# Patient Record
Sex: MALE | Race: WHITE | Employment: OTHER | URBAN - METROPOLITAN AREA
[De-identification: names, ages, dates, MRNs, and addresses within clinical notes are randomized per-mention and may not be internally consistent; named-entity substitution may affect disease eponyms.]

---

## 2018-03-29 PROBLEM — R55 SYNCOPE: Status: ACTIVE | Noted: 2018-03-29

## 2018-03-29 PROBLEM — E78.5 HYPERLIPIDEMIA: Status: ACTIVE | Noted: 2018-03-29

## 2018-03-29 PROBLEM — I10 HTN (HYPERTENSION): Status: ACTIVE | Noted: 2018-03-29

## 2018-03-29 PROBLEM — R55 SYNCOPE AND COLLAPSE: Status: ACTIVE | Noted: 2018-03-29

## 2020-02-21 ENCOUNTER — OFFICE VISIT (OUTPATIENT)
Dept: ORTHOPEDIC SURGERY | Age: 85
End: 2020-02-21
Payer: MEDICARE

## 2020-02-21 VITALS — BODY MASS INDEX: 25.2 KG/M2 | WEIGHT: 180 LBS | HEIGHT: 71 IN

## 2020-02-21 PROCEDURE — 1036F TOBACCO NON-USER: CPT | Performed by: PHYSICIAN ASSISTANT

## 2020-02-21 PROCEDURE — G8417 CALC BMI ABV UP PARAM F/U: HCPCS | Performed by: PHYSICIAN ASSISTANT

## 2020-02-21 PROCEDURE — G8428 CUR MEDS NOT DOCUMENT: HCPCS | Performed by: PHYSICIAN ASSISTANT

## 2020-02-21 PROCEDURE — 99203 OFFICE O/P NEW LOW 30 MIN: CPT | Performed by: PHYSICIAN ASSISTANT

## 2020-02-21 PROCEDURE — 4040F PNEUMOC VAC/ADMIN/RCVD: CPT | Performed by: PHYSICIAN ASSISTANT

## 2020-02-21 PROCEDURE — G8484 FLU IMMUNIZE NO ADMIN: HCPCS | Performed by: PHYSICIAN ASSISTANT

## 2020-02-21 PROCEDURE — 1123F ACP DISCUSS/DSCN MKR DOCD: CPT | Performed by: PHYSICIAN ASSISTANT

## 2020-02-21 NOTE — PROGRESS NOTES
Chief Complaint    Hip Pain (lt hip lateral and thigh pain, back of hip, ongoing for awhile, no injury, hx of back issues)      History of Present Illness:  Amor Zhang is a 80 y.o. male presents to the office today for a new problem. Patient is here with a chief complaint of left lateral hip pain, thigh pain, and low back pain. He states that approximately 2 months he did stumble and fall into his dresser. He remains incredibly active for 29-year-old man. Increased pain with weightbearing activities. He denies any radicular symptoms at this time. He has tried to use a walker and a cane without significant relief. Patient has also had both bladder and prostate cancer. Pain Assessment  Location of Pain: Pelvis  Location Modifiers: Left  Severity of Pain: 5  Frequency of Pain: Intermittent  Aggravating Factors: Walking, Standing, Stairs  Limiting Behavior: Some  Result of Injury: No  Work-Related Injury: No  Are there other pain locations you wish to document?: No]       Medical History:  Past Medical History:   Diagnosis Date    Cancer [facility])      Patient Active Problem List    Diagnosis Date Noted    Syncope 03/29/2018    HTN (hypertension) 03/29/2018    Hyperlipidemia 03/29/2018    Syncope and collapse 03/29/2018     No past surgical history on file. No family history on file.   Social History     Socioeconomic History    Marital status:      Spouse name: Not on file    Number of children: Not on file    Years of education: Not on file    Highest education level: Not on file   Occupational History    Not on file   Social Needs    Financial resource strain: Not on file    Food insecurity:     Worry: Not on file     Inability: Not on file    Transportation needs:     Medical: Not on file     Non-medical: Not on file   Tobacco Use    Smoking status: Never Smoker    Smokeless tobacco: Never Used   Substance and Sexual Activity    Alcohol use: No    Drug use: No    Sexual activity: Not on file   Lifestyle    Physical activity:     Days per week: Not on file     Minutes per session: Not on file    Stress: Not on file   Relationships    Social connections:     Talks on phone: Not on file     Gets together: Not on file     Attends [employer] service: Not on file     Active member of club or organization: Not on file     Attends meetings of clubs or organizations: Not on file     Relationship status: Not on file    Intimate partner violence:     Fear of current or ex partner: Not on file     Emotionally abused: Not on file     Physically abused: Not on file     Forced sexual activity: Not on file   Other Topics Concern    Not on file   Social History Narrative    Not on file     Current Outpatient Medications   Medication Sig Dispense Refill    aspirin 325 MG EC tablet Take 325 mg by mouth daily      clopidogrel (PLAVIX) 75 MG tablet Take 75 mg by mouth daily      lisinopril (PRINIVIL;ZESTRIL) 2.5 MG tablet Take 2.5 mg by mouth daily      atenolol (TENORMIN) 25 MG tablet Take 25 mg by mouth daily      simvastatin (ZOCOR) 20 MG tablet Take 20 mg by mouth nightly       No current facility-administered medications for this visit. Review of Systems:  Relevant review of systems reviewed and available in the patient's chart    Vital Signs: There were no vitals filed for this visit. General Exam:   Constitutional: Patient is adequately groomed with no evidence of malnutrition  DTRs: Deep tendon reflexes are intact  Mental Status: The patient is oriented to time, place and person. The patient's mood and affect are appropriate. Lymphatic: The lymphatic examination bilaterally reveals all areas to be without enlargement or induration. Vascular: Examination reveals no swelling or calf tenderness. Peripheral pulses are palpable and 2+. Neurological: The patient has good coordination. There is no weakness or sensory deficit.     Left hip Examination:    Inspection:  No erythema or signs of infection. There are no cutaneous lesions. Palpation:  There is moderate tenderness over the greater trochanteric region. Range of Motion: Full range of motion with reproducible groin and lateral hip pain    Strength: 5/5 strength with flexion and extension    Special Tests:  Positive Nima's test.  Negative log roll maneuver. Negative Homans test.    Skin: There are no rashes, ulcerations or lesions. Gait: Slightly antalgic gait favoring the unaffected side. Reflex 2+ patellar    Additional Comments:       Additional Examinations:         Contralateral Exam: Examination of the right hip reveals intact skin. The patient demonstrates full painless range of motion with regards to flexion, abduction, internal and external rotation. There is no tenderness about the greater trochanter. There is a negative straight leg raise against resistance. Strength is 5/5 throughout all planes. Lower Back: Examination of the lower back does not show any tenderness, deformity or injury. Range of motion is unremarkable. There is no gross instability. There are no rashes, ulcerations or lesions. Strength and tone are normal.    Radiology:     X-rays obtained and reviewed in office:  Views 2 views including AP pelvis and lateral  Location left hip  Impression no fractures or malalignment identified. The femoral acetabular joint space appears well maintained. Impression:  Encounter Diagnosis   Name Primary?  Pain of left hip joint Yes       Office Procedures:  Orders Placed This Encounter   Procedures    XR HIP LEFT (2-3 VIEWS)     Standing Status:   Future     Number of Occurrences:   1     Standing Expiration Date:   2/19/2021       Treatment Plan: With the patient's history of trauma and continued hip pain I have ordered the patient an MRI to evaluate for occult fracture. We will see him back after the MRI. Patient should continue to use his walker full-time. Max 50% weightbearing.   If there

## 2020-02-22 ENCOUNTER — HOSPITAL ENCOUNTER (OUTPATIENT)
Dept: MRI IMAGING | Age: 85
Discharge: HOME | End: 2020-02-22
Payer: MEDICARE

## 2020-02-22 PROCEDURE — 73721 MRI JNT OF LWR EXTRE W/O DYE: CPT

## 2020-02-24 NOTE — RESULT ENCOUNTER NOTE
Spoke to patient. He has bilateral hip stress fractures involving the iliac bones. He also has a compression fracture in his lumbar spine. He will be max 50% weightbearing with walker at all times. Encouraged rest and limited activities.

## 2020-02-26 ENCOUNTER — OFFICE VISIT (OUTPATIENT)
Dept: ORTHOPEDIC SURGERY | Age: 85
End: 2020-02-26
Payer: MEDICARE

## 2020-02-26 PROCEDURE — 99213 OFFICE O/P EST LOW 20 MIN: CPT | Performed by: PHYSICIAN ASSISTANT

## 2020-02-26 PROCEDURE — G8484 FLU IMMUNIZE NO ADMIN: HCPCS | Performed by: PHYSICIAN ASSISTANT

## 2020-02-26 PROCEDURE — G8417 CALC BMI ABV UP PARAM F/U: HCPCS | Performed by: PHYSICIAN ASSISTANT

## 2020-02-26 PROCEDURE — 1036F TOBACCO NON-USER: CPT | Performed by: PHYSICIAN ASSISTANT

## 2020-02-26 PROCEDURE — 4040F PNEUMOC VAC/ADMIN/RCVD: CPT | Performed by: PHYSICIAN ASSISTANT

## 2020-02-26 PROCEDURE — G8428 CUR MEDS NOT DOCUMENT: HCPCS | Performed by: PHYSICIAN ASSISTANT

## 2020-02-26 PROCEDURE — 1123F ACP DISCUSS/DSCN MKR DOCD: CPT | Performed by: PHYSICIAN ASSISTANT

## 2020-02-26 NOTE — PROGRESS NOTES
Chief Complaint    Results (mri lt hip)      History of Present Illness:  Sera Bermudez is a 80 y.o. male returns today for a follow-up on his left hip after undergoing a recent MRI. He was actually called just the other day with the results which indicated that he had bilateral nondisplaced fractures of the acetabular region. There is also possible evidence of a nondisplaced, subacute fracture of the L5 lumbar vertebrae. He was instructed to get on a walker and remain protected weightbearing. He states that approximately 2 months he did stumble and fall into his dresser. .    Pain Assessment  Location of Pain: Pelvis  Location Modifiers: Left  Severity of Pain: 2  Frequency of Pain: Intermittent  Aggravating Factors: Standing, Walking  Limiting Behavior: Some  Work-Related Injury: No  Are there other pain locations you wish to document?: No]       Medical History:  Past Medical History:   Diagnosis Date    Cancer [facility])      Patient Active Problem List    Diagnosis Date Noted    Syncope 03/29/2018    HTN (hypertension) 03/29/2018    Hyperlipidemia 03/29/2018    Syncope and collapse 03/29/2018     No past surgical history on file. No family history on file.   Social History     Socioeconomic History    Marital status:      Spouse name: Not on file    Number of children: Not on file    Years of education: Not on file    Highest education level: Not on file   Occupational History    Not on file   Social Needs    Financial resource strain: Not on file    Food insecurity:     Worry: Not on file     Inability: Not on file    Transportation needs:     Medical: Not on file     Non-medical: Not on file   Tobacco Use    Smoking status: Never Smoker    Smokeless tobacco: Never Used   Substance and Sexual Activity    Alcohol use: No    Drug use: No    Sexual activity: Not on file   Lifestyle    Physical activity:     Days per week: Not on file     Minutes per session: Not on file    Stress: Not on file   Relationships    Social connections:     Talks on phone: Not on file     Gets together: Not on file     Attends Sikhism service: Not on file     Active member of club or organization: Not on file     Attends meetings of clubs or organizations: Not on file     Relationship status: Not on file    Intimate partner violence:     Fear of current or ex partner: Not on file     Emotionally abused: Not on file     Physically abused: Not on file     Forced sexual activity: Not on file   Other Topics Concern    Not on file   Social History Narrative    Not on file     Current Outpatient Medications   Medication Sig Dispense Refill    aspirin 325 MG EC tablet Take 325 mg by mouth daily      clopidogrel (PLAVIX) 75 MG tablet Take 75 mg by mouth daily      lisinopril (PRINIVIL;ZESTRIL) 2.5 MG tablet Take 2.5 mg by mouth daily      atenolol (TENORMIN) 25 MG tablet Take 25 mg by mouth daily      simvastatin (ZOCOR) 20 MG tablet Take 20 mg by mouth nightly       No current facility-administered medications for this visit. Review of Systems:  Relevant review of systems reviewed and available in the patient's chart    Vital Signs: There were no vitals filed for this visit. General Exam:   Constitutional: Patient is adequately groomed with no evidence of malnutrition  DTRs: Deep tendon reflexes are intact  Mental Status: The patient is oriented to time, place and person. The patient's mood and affect are appropriate. Lymphatic: The lymphatic examination bilaterally reveals all areas to be without enlargement or induration. Vascular: Examination reveals no swelling or calf tenderness. Peripheral pulses are palpable and 2+. Neurological: The patient has good coordination. There is no weakness or sensory deficit. Left hip Examination:    Inspection:  No erythema or signs of infection. There are no cutaneous lesions.     Palpation:  There is moderate tenderness over the greater trochanteric this encounter. Treatment Plan: Today have gone over the diagnosis and the MRI results with the patient and his wife. I would recommend 4 to 6 weeks of protected weightbearing with a traditional walker. He understands that he must refrain from any labors activity during this period of time. We need to see him back in 4 weeks for repeat clinical exam and x-ray. If he is doing better at that time he may benefit from a course of outpatient physical therapy.

## 2020-03-27 ENCOUNTER — CLINICAL DOCUMENTATION (OUTPATIENT)
Dept: ORTHOPEDIC SURGERY | Age: 85
End: 2020-03-27

## 2020-11-03 PROBLEM — R55 SYNCOPE: Status: RESOLVED | Noted: 2018-03-29 | Resolved: 2020-11-03

## 2020-11-29 ENCOUNTER — APPOINTMENT (OUTPATIENT)
Dept: GENERAL RADIOLOGY | Age: 85
DRG: 481 | End: 2020-11-29
Payer: MEDICARE

## 2020-11-29 ENCOUNTER — APPOINTMENT (OUTPATIENT)
Dept: CT IMAGING | Age: 85
DRG: 481 | End: 2020-11-29
Payer: MEDICARE

## 2020-11-29 ENCOUNTER — HOSPITAL ENCOUNTER (INPATIENT)
Age: 85
LOS: 12 days | Discharge: NURSING, REHAB OR LONG-TERM CARE | DRG: 481 | End: 2020-12-11
Attending: EMERGENCY MEDICINE | Admitting: INTERNAL MEDICINE
Payer: MEDICARE

## 2020-11-29 PROBLEM — S72.001A CLOSED RIGHT HIP FRACTURE (HCC): Status: ACTIVE | Noted: 2020-11-29

## 2020-11-29 PROBLEM — S72.001A CLOSED RIGHT HIP FRACTURE, INITIAL ENCOUNTER (HCC): Status: ACTIVE | Noted: 2020-11-29

## 2020-11-29 PROBLEM — N17.9 AKI (ACUTE KIDNEY INJURY) (HCC): Status: ACTIVE | Noted: 2020-11-29

## 2020-11-29 LAB
A/G RATIO: 1.5 (ref 1.1–2.2)
ALBUMIN SERPL-MCNC: 4.1 G/DL (ref 3.4–5)
ALP BLD-CCNC: 96 U/L (ref 40–129)
ALT SERPL-CCNC: 13 U/L (ref 10–40)
ANION GAP SERPL CALCULATED.3IONS-SCNC: 8 MMOL/L (ref 3–16)
AST SERPL-CCNC: 23 U/L (ref 15–37)
BACTERIA: ABNORMAL /HPF
BASOPHILS ABSOLUTE: 0.1 K/UL (ref 0–0.2)
BASOPHILS RELATIVE PERCENT: 0.6 %
BILIRUB SERPL-MCNC: 0.6 MG/DL (ref 0–1)
BILIRUBIN URINE: NEGATIVE
BLOOD, URINE: ABNORMAL
BUN BLDV-MCNC: 40 MG/DL (ref 7–20)
CALCIUM SERPL-MCNC: 8.8 MG/DL (ref 8.3–10.6)
CHLORIDE BLD-SCNC: 107 MMOL/L (ref 99–110)
CLARITY: CLEAR
CO2: 26 MMOL/L (ref 21–32)
COLOR: YELLOW
CREAT SERPL-MCNC: 1.8 MG/DL (ref 0.8–1.3)
EOSINOPHILS ABSOLUTE: 0.2 K/UL (ref 0–0.6)
EOSINOPHILS RELATIVE PERCENT: 2.1 %
EPITHELIAL CELLS, UA: ABNORMAL /HPF (ref 0–5)
GFR AFRICAN AMERICAN: 43
GFR NON-AFRICAN AMERICAN: 36
GLOBULIN: 2.7 G/DL
GLUCOSE BLD-MCNC: 130 MG/DL (ref 70–99)
GLUCOSE URINE: NEGATIVE MG/DL
HCT VFR BLD CALC: 34.7 % (ref 40.5–52.5)
HEMOGLOBIN: 11.8 G/DL (ref 13.5–17.5)
INR BLD: 1.14 (ref 0.86–1.14)
KETONES, URINE: ABNORMAL MG/DL
LEUKOCYTE ESTERASE, URINE: NEGATIVE
LYMPHOCYTES ABSOLUTE: 1.6 K/UL (ref 1–5.1)
LYMPHOCYTES RELATIVE PERCENT: 15.4 %
MCH RBC QN AUTO: 32.8 PG (ref 26–34)
MCHC RBC AUTO-ENTMCNC: 33.9 G/DL (ref 31–36)
MCV RBC AUTO: 96.8 FL (ref 80–100)
MICROSCOPIC EXAMINATION: YES
MONOCYTES ABSOLUTE: 0.8 K/UL (ref 0–1.3)
MONOCYTES RELATIVE PERCENT: 7.9 %
MUCUS: ABNORMAL /LPF
NEUTROPHILS ABSOLUTE: 7.8 K/UL (ref 1.7–7.7)
NEUTROPHILS RELATIVE PERCENT: 74 %
NITRITE, URINE: NEGATIVE
PDW BLD-RTO: 13.2 % (ref 12.4–15.4)
PH UA: 6 (ref 5–8)
PLATELET # BLD: 275 K/UL (ref 135–450)
PMV BLD AUTO: 8 FL (ref 5–10.5)
POTASSIUM SERPL-SCNC: 4.3 MMOL/L (ref 3.5–5.1)
PROTEIN UA: NEGATIVE MG/DL
PROTHROMBIN TIME: 13.3 SEC (ref 10–13.2)
RBC # BLD: 3.58 M/UL (ref 4.2–5.9)
RBC UA: ABNORMAL /HPF (ref 0–4)
SODIUM BLD-SCNC: 141 MMOL/L (ref 136–145)
SPECIFIC GRAVITY UA: >=1.03 (ref 1–1.03)
SPECIMEN STATUS: NORMAL
TOTAL PROTEIN: 6.8 G/DL (ref 6.4–8.2)
URINE TYPE: ABNORMAL
UROBILINOGEN, URINE: 0.2 E.U./DL
WBC # BLD: 10.5 K/UL (ref 4–11)
WBC UA: ABNORMAL /HPF (ref 0–5)

## 2020-11-29 PROCEDURE — 2580000003 HC RX 258: Performed by: NURSE PRACTITIONER

## 2020-11-29 PROCEDURE — 96374 THER/PROPH/DIAG INJ IV PUSH: CPT

## 2020-11-29 PROCEDURE — 85025 COMPLETE CBC W/AUTO DIFF WBC: CPT

## 2020-11-29 PROCEDURE — 71045 X-RAY EXAM CHEST 1 VIEW: CPT

## 2020-11-29 PROCEDURE — 80053 COMPREHEN METABOLIC PANEL: CPT

## 2020-11-29 PROCEDURE — 6360000002 HC RX W HCPCS: Performed by: NURSE PRACTITIONER

## 2020-11-29 PROCEDURE — 70450 CT HEAD/BRAIN W/O DYE: CPT

## 2020-11-29 PROCEDURE — 72125 CT NECK SPINE W/O DYE: CPT

## 2020-11-29 PROCEDURE — 81001 URINALYSIS AUTO W/SCOPE: CPT

## 2020-11-29 PROCEDURE — 1200000000 HC SEMI PRIVATE

## 2020-11-29 PROCEDURE — 73502 X-RAY EXAM HIP UNI 2-3 VIEWS: CPT

## 2020-11-29 PROCEDURE — 93005 ELECTROCARDIOGRAM TRACING: CPT | Performed by: NURSE PRACTITIONER

## 2020-11-29 PROCEDURE — 85610 PROTHROMBIN TIME: CPT

## 2020-11-29 PROCEDURE — 96375 TX/PRO/DX INJ NEW DRUG ADDON: CPT

## 2020-11-29 PROCEDURE — 36415 COLL VENOUS BLD VENIPUNCTURE: CPT

## 2020-11-29 PROCEDURE — 99284 EMERGENCY DEPT VISIT MOD MDM: CPT

## 2020-11-29 RX ORDER — ONDANSETRON 2 MG/ML
4 INJECTION INTRAMUSCULAR; INTRAVENOUS EVERY 30 MIN PRN
Status: COMPLETED | OUTPATIENT
Start: 2020-11-29 | End: 2020-11-29

## 2020-11-29 RX ORDER — SODIUM CHLORIDE 9 MG/ML
INJECTION, SOLUTION INTRAVENOUS CONTINUOUS
Status: DISCONTINUED | OUTPATIENT
Start: 2020-11-29 | End: 2020-12-02

## 2020-11-29 RX ORDER — POLYETHYLENE GLYCOL 3350 17 G/17G
17 POWDER, FOR SOLUTION ORAL DAILY PRN
Status: DISCONTINUED | OUTPATIENT
Start: 2020-11-29 | End: 2020-12-11 | Stop reason: HOSPADM

## 2020-11-29 RX ORDER — MORPHINE SULFATE 4 MG/ML
4 INJECTION, SOLUTION INTRAMUSCULAR; INTRAVENOUS EVERY 30 MIN PRN
Status: DISCONTINUED | OUTPATIENT
Start: 2020-11-29 | End: 2020-12-03

## 2020-11-29 RX ORDER — CLOPIDOGREL BISULFATE 75 MG/1
75 TABLET ORAL DAILY
Status: DISCONTINUED | OUTPATIENT
Start: 2020-11-30 | End: 2020-12-11 | Stop reason: HOSPADM

## 2020-11-29 RX ORDER — TRAMADOL HYDROCHLORIDE 50 MG/1
50 TABLET ORAL EVERY 6 HOURS PRN
Status: DISCONTINUED | OUTPATIENT
Start: 2020-11-29 | End: 2020-12-11 | Stop reason: HOSPADM

## 2020-11-29 RX ORDER — MORPHINE SULFATE 4 MG/ML
4 INJECTION, SOLUTION INTRAMUSCULAR; INTRAVENOUS
Status: DISCONTINUED | OUTPATIENT
Start: 2020-11-29 | End: 2020-12-03

## 2020-11-29 RX ORDER — ATENOLOL 25 MG/1
25 TABLET ORAL DAILY
Status: DISCONTINUED | OUTPATIENT
Start: 2020-11-30 | End: 2020-12-09

## 2020-11-29 RX ORDER — MORPHINE SULFATE 2 MG/ML
2 INJECTION, SOLUTION INTRAMUSCULAR; INTRAVENOUS
Status: DISCONTINUED | OUTPATIENT
Start: 2020-11-29 | End: 2020-12-03

## 2020-11-29 RX ORDER — ONDANSETRON 2 MG/ML
4 INJECTION INTRAMUSCULAR; INTRAVENOUS EVERY 6 HOURS PRN
Status: DISCONTINUED | OUTPATIENT
Start: 2020-11-29 | End: 2020-12-11 | Stop reason: HOSPADM

## 2020-11-29 RX ORDER — TRAMADOL HYDROCHLORIDE 50 MG/1
100 TABLET ORAL EVERY 6 HOURS PRN
Status: DISCONTINUED | OUTPATIENT
Start: 2020-11-29 | End: 2020-12-11 | Stop reason: HOSPADM

## 2020-11-29 RX ORDER — SODIUM CHLORIDE 0.9 % (FLUSH) 0.9 %
10 SYRINGE (ML) INJECTION PRN
Status: DISCONTINUED | OUTPATIENT
Start: 2020-11-29 | End: 2020-12-11 | Stop reason: HOSPADM

## 2020-11-29 RX ORDER — SODIUM CHLORIDE 0.9 % (FLUSH) 0.9 %
10 SYRINGE (ML) INJECTION EVERY 12 HOURS SCHEDULED
Status: DISCONTINUED | OUTPATIENT
Start: 2020-11-29 | End: 2020-12-02

## 2020-11-29 RX ORDER — ACETAMINOPHEN 325 MG/1
650 TABLET ORAL EVERY 6 HOURS PRN
Status: DISCONTINUED | OUTPATIENT
Start: 2020-11-29 | End: 2020-12-11 | Stop reason: HOSPADM

## 2020-11-29 RX ORDER — ACETAMINOPHEN 650 MG/1
650 SUPPOSITORY RECTAL EVERY 6 HOURS PRN
Status: DISCONTINUED | OUTPATIENT
Start: 2020-11-29 | End: 2020-12-11 | Stop reason: HOSPADM

## 2020-11-29 RX ORDER — PROMETHAZINE HYDROCHLORIDE 25 MG/1
12.5 TABLET ORAL EVERY 6 HOURS PRN
Status: DISCONTINUED | OUTPATIENT
Start: 2020-11-29 | End: 2020-12-11 | Stop reason: HOSPADM

## 2020-11-29 RX ORDER — SIMVASTATIN 10 MG
20 TABLET ORAL NIGHTLY
Status: DISCONTINUED | OUTPATIENT
Start: 2020-11-30 | End: 2020-12-11 | Stop reason: HOSPADM

## 2020-11-29 RX ORDER — LISINOPRIL 2.5 MG/1
2.5 TABLET ORAL DAILY
Status: DISCONTINUED | OUTPATIENT
Start: 2020-11-30 | End: 2020-12-11 | Stop reason: HOSPADM

## 2020-11-29 RX ADMIN — ONDANSETRON 4 MG: 2 INJECTION INTRAMUSCULAR; INTRAVENOUS at 20:26

## 2020-11-29 RX ADMIN — MORPHINE SULFATE 4 MG: 4 INJECTION, SOLUTION INTRAMUSCULAR; INTRAVENOUS at 20:28

## 2020-11-29 RX ADMIN — MORPHINE SULFATE 4 MG: 4 INJECTION, SOLUTION INTRAMUSCULAR; INTRAVENOUS at 18:20

## 2020-11-29 RX ADMIN — SODIUM CHLORIDE, PRESERVATIVE FREE 10 ML: 5 INJECTION INTRAVENOUS at 22:23

## 2020-11-29 RX ADMIN — SODIUM CHLORIDE: 9 INJECTION, SOLUTION INTRAVENOUS at 22:27

## 2020-11-29 RX ADMIN — SODIUM CHLORIDE: 9 INJECTION, SOLUTION INTRAVENOUS at 20:25

## 2020-11-29 RX ADMIN — ONDANSETRON 4 MG: 2 INJECTION INTRAMUSCULAR; INTRAVENOUS at 18:20

## 2020-11-29 ASSESSMENT — PAIN SCALES - GENERAL
PAINLEVEL_OUTOF10: 8
PAINLEVEL_OUTOF10: 8

## 2020-11-29 NOTE — ED PROVIDER NOTES
I independently performed a history and physical on Radha Corbin. All diagnostic, treatment, and disposition decisions were made by myself in conjunction with the advanced practice provider. For further details of [facility] emergency department encounter, please see Kaitlin Rizvi NP's documentation. Patient is a 66-year-old male who was doing yard work and ultimately slipped causing him to fall onto his right hip. He denies hitting his head and has no headache or neck pain. He is on Plavix. No chest pain or shortness of breath. He states that he does have some balance issues intermittently but this was related to slipping today. No syncope or loss of consciousness. As long as he does not move the right hip, he denies any significant pain. He has chronic low back pain but denies any new changes since the fall. No abdominal pain. He denies any concern with Covid. No fever. He denies any numbness or weakness that is new in the arms or legs. No other complaints at this time besides for the right hip pain. Physical:   Gen: No acute distress. AOx3.   Psych: Normal mood and affect  HEENT: NCAT,PERRL, MMM, no septal hematoma bilaterally, no epistaxis  Neck: supple, normal range of motion, no midline tenderness, nontender to palpation  Cardiac: RRR, pulses 2+ in all 4 extremities  Lungs: C2AB, no R/R/W  Abdomen: soft and nontender with no R/D/G  Neuro: no focal neuro deficits with strength and sensation 5/5 in all 4 extremities including dorsiflexion and plantar flexion bilaterally, limited right lower extremity exam due to obvious shortening and rotation to the right leg  MSK: Normal range of motion of bilateral shoulders, elbows, wrists, and ankles and nontender to palpation of all of these joints, normal range of motion of left hip and left knee and nontender to palpation to these joints, nontender to palpation to the right knee, tenderness to palpation to right hip  Skin: No laceration      The Ekg interpreted by me shows  normal sinus rhythm with a rate of 72  Axis is   Normal  QTc is  normal  Intervals and Durations are unremarkable. ST Segments: no acute change and nonspecific changes  No significant change from prior EKG dated - 3/29/18  No STEMI       MDM: Patient was evaluated due to concern for fall with subsequently landing on right hip and significant pain following this. X-ray did show acute comminuted intertrochanteric fracture of the right femur associate with some displacement. X-ray of the chest mentioned a left fourth rib fracture although he was denying any chest pain or shortness of breath or any chest tenderness on exam.  CT of the head and cervical spine were obtained since he is on Plavix and fell although he denied any headache and no reported concern for intracranial hemorrhage or cervical spine fracture at this time per radiologist.  He will need further evaluation in the hospital with orthopedic evaluation. He was in no acute distress when I saw him and stable for the floor. His kidney function did have a mild acute kidney injury as well compared to lab work from 2019.        Sienna Ramey MD  11/29/20 [id number]

## 2020-11-29 NOTE — ED PROVIDER NOTES
Evaluated by Advanced Practice Provider    [facility]  ED    CHIEF COMPLAINT  Fall (Pt was doing yard work in his field and fell down. Pt has R hip pain with some shortening and external rotation. Pt given 100 mcg of fentanyl IM via EMS while in route. Pt denies syncope or hitting head.) and Hip Pain    HISTORY OFPRESENT ILLNESS  Yariel Corbin is a 80 y.o. male who presents to the ED complaining of right hip pain. This injury happened: just prior to arrival in the ER. Mechanism of injury: patient was trying to get wood loaded up, he had gotten off of his tractor and stepped on something that caused him to loose his balance and he fell backwards. He landed on his right hip. He is reporting pain in the proximal right thigh. Denies numbness or tingling into the right lower extremity. He reports that it causes a lot of pain for him to move but if he lays really still he is not hurting. He denies that he hit his head when he fell. He denies any other areas of pain. Denies any other injuries. He denies neck pain. Patient denies chest pain or shortness of breath, deniesabdominal pain, nausea, vomiting, diarrhea. Patient denies any fever or chills. The patient is currently rating their pain as 0/10. Only pain with movement. Treatments tried prior to arrival in the ED include: fentanyl per EMS. The patient deniesother complaints, modifying factors or associated symptoms. The patient arrived to the ED via EMS transport. Nursing notes reviewed. Past Medical History:   Diagnosis Date    Cancer [facility])     Bladder and skin per pt     History reviewed. No pertinent surgical history. History reviewed. No pertinent family history.   Social History     Socioeconomic History    Marital status:      Spouse name: Not on file    Number of children: Not on file    Years of education: Not on file    Highest education level: Not on file   Occupational History    Not on file Social Needs    Financial resource strain: Not on file    Food insecurity     Worry: Not on file     Inability: Not on file    Transportation needs     Medical: Not on file     Non-medical: Not on file   Tobacco Use    Smoking status: Never Smoker    Smokeless tobacco: Never Used   Substance and Sexual Activity    Alcohol use: No    Drug use: No    Sexual activity: Not on file   Lifestyle    Physical activity     Days per week: Not on file     Minutes per session: Not on file    Stress: Not on file   Relationships    Social connections     Talks on phone: Not on file     Gets together: Not on file     Attends Orthodoxy service: Not on file     Active member of club or organization: Not on file     Attends meetings of clubs or organizations: Not on file     Relationship status: Not on file    Intimate partner violence     Fear of current or ex partner: Not on file     Emotionally abused: Not on file     Physically abused: Not on file     Forced sexual activity: Not on file   Other Topics Concern    Not on file   Social History Narrative    Not on file     Current Facility-Administered Medications   Medication Dose Route Frequency Provider Last Rate Last Dose    morphine (PF) injection 4 mg  4 mg Intravenous Q30 Min PRN Nagi Octave, APRN - CNP   4 mg at 11/29/20 1820    ondansetron (ZOFRAN) injection 4 mg  4 mg Intravenous Q30 Min PRN Nagi Octave, APRN - CNP   4 mg at 11/29/20 1820    0.9 % sodium chloride infusion   Intravenous Continuous Nagi Octave, APRN - CNP         Current Outpatient Medications   Medication Sig Dispense Refill    aspirin 325 MG EC tablet Take 325 mg by mouth daily      clopidogrel (PLAVIX) 75 MG tablet Take 75 mg by mouth daily      lisinopril (PRINIVIL;ZESTRIL) 2.5 MG tablet Take 2.5 mg by mouth daily      atenolol (TENORMIN) 25 MG tablet Take 25 mg by mouth daily      simvastatin (ZOCOR) 20 MG tablet Take 20 mg by mouth nightly       No Known - 100.0 fL    MCH 32.8 26.0 - 34.0 pg    MCHC 33.9 31.0 - 36.0 g/dL    RDW 13.2 12.4 - 15.4 %    Platelets 045 382 - 737 K/uL    MPV 8.0 5.0 - 10.5 fL    Neutrophils % 74.0 %    Lymphocytes % 15.4 %    Monocytes % 7.9 %    Eosinophils % 2.1 %    Basophils % 0.6 %    Neutrophils Absolute 7.8 (H) 1.7 - 7.7 K/uL    Lymphocytes Absolute 1.6 1.0 - 5.1 K/uL    Monocytes Absolute 0.8 0.0 - 1.3 K/uL    Eosinophils Absolute 0.2 0.0 - 0.6 K/uL    Basophils Absolute 0.1 0.0 - 0.2 K/uL   Comprehensive metabolic panel   Result Value Ref Range    Sodium 141 136 - 145 mmol/L    Potassium 4.3 3.5 - 5.1 mmol/L    Chloride 107 99 - 110 mmol/L    CO2 26 21 - 32 mmol/L    Anion Gap 8 3 - 16    Glucose 130 (H) 70 - 99 mg/dL    BUN 40 (H) 7 - 20 mg/dL    CREATININE 1.8 (H) 0.8 - 1.3 mg/dL    GFR Non- 36 (A) >60    GFR  43 (A) >60    Calcium 8.8 8.3 - 10.6 mg/dL    Total Protein 6.8 6.4 - 8.2 g/dL    Alb 4.1 3.4 - 5.0 g/dL    Albumin/Globulin Ratio 1.5 1.1 - 2.2    Total Bilirubin 0.6 0.0 - 1.0 mg/dL    Alkaline Phosphatase 96 40 - 129 U/L    ALT 13 10 - 40 U/L    AST 23 15 - 37 U/L    Globulin 2.7 g/dL   Sample possible blood bank testing   Result Value Ref Range    Specimen Status MANISH    Protime-INR   Result Value Ref Range    Protime 13.3 (H) 10.0 - 13.2 sec    INR 1.14 0.86 - 1.14   Urinalysis, reflex to microscopic   Result Value Ref Range    Color, UA Yellow Straw/Yellow    Clarity, UA Clear Clear    Glucose, Ur Negative Negative mg/dL    Bilirubin Urine Negative Negative    Ketones, Urine TRACE (A) Negative mg/dL    Specific Gravity, UA >=1.030 1.005 - 1.030    Blood, Urine MODERATE (A) Negative    pH, UA 6.0 5.0 - 8.0    Protein, UA Negative Negative mg/dL    Urobilinogen, Urine 0.2 <2.0 E.U./dL    Nitrite, Urine Negative Negative    Leukocyte Esterase, Urine Negative Negative    Microscopic Examination YES     Urine Type NotGiven    Microscopic Urinalysis   Result Value Ref Range    Mucus, UA Rare (A) None Seen /LPF    WBC, UA 0-2 0 - 5 /HPF    RBC, UA 11-20 (A) 0 - 4 /HPF    Epithelial Cells, UA 2-5 0 - 5 /HPF    Bacteria, UA Rare (A) None Seen /HPF   EKG 12 Lead   Result Value Ref Range    Ventricular Rate 72 BPM    Atrial Rate 72 BPM    P-R Interval 236 ms    QRS Duration 96 ms    Q-T Interval 402 ms    QTc Calculation (Bazett) 440 ms    P Axis 85 degrees    R Axis 31 degrees    T Axis 92 degrees    Diagnosis       Sinus rhythm with 1st degree A-V blockPossible Inferior infarct (cited on or before 29-MAR-2018)Abnormal ECGWhen compared with ECG of 29-MAR-2018 07:14,Nonspecific T wave abnormality has replaced inverted T waves in Lateral leads       RADIOLOGY    Ct Head Wo Contrast    Result Date: 11/29/2020  EXAMINATION: CT OF THE HEAD WITHOUT CONTRAST  11/29/2020 5:06 pm TECHNIQUE: CT of the head was performed without the administration of intravenous contrast. Dose modulation, iterative reconstruction, and/or weight based adjustment of the mA/kV was utilized to reduce the radiation dose to as low as reasonably achievable. COMPARISON: None. HISTORY: ORDERING SYSTEM PROVIDED HISTORY: fall TECHNOLOGIST PROVIDED HISTORY: Reason for exam:->fall Has a \"code stroke\" or \"stroke alert\" been called? ->No Reason for Exam: pt fell Acuity: Acute Type of Exam: Initial FINDINGS: The ventricles are midline and symmetric. There is no evidence of intracranial hemorrhage, focal mass lesion, or acute ischemia. There is no evidence of skull fracture. No acute intracranial abnormality. Ct Cervical Spine Wo Contrast    Result Date: 11/29/2020  EXAMINATION: CT OF THE CERVICAL SPINE WITHOUT CONTRAST 11/29/2020 5:06 pm TECHNIQUE: CT of the cervical spine was performed without the administration of intravenous contrast. Multiplanar reformatted images are provided for review.  Dose modulation, iterative reconstruction, and/or weight based adjustment of the mA/kV was utilized to reduce the radiation dose to as low as reasonably achievable. COMPARISON: None. HISTORY: ORDERING SYSTEM PROVIDED HISTORY: fall TECHNOLOGIST PROVIDED HISTORY: Reason for exam:->fall Reason for Exam: pt fell Acuity: Acute Type of Exam: Initial FINDINGS: BONES/ALIGNMENT: There is no acute fracture or traumatic malalignment. DEGENERATIVE CHANGES: There are moderate degenerative changes. SOFT TISSUES: There is no prevertebral soft tissue swelling. No acute abnormality of the cervical spine. Xr Chest Portable    Result Date: 11/29/2020  EXAMINATION: ONE XRAY VIEW OF THE CHEST 11/29/2020 4:16 pm COMPARISON: None. HISTORY: ORDERING SYSTEM PROVIDED HISTORY: fall TECHNOLOGIST PROVIDED HISTORY: Reason for exam:->fall FINDINGS: There is a mildly displaced fracture of the left 4th lateral rib. There is no definite associated pneumothorax. There is scattered calcified pleural plaques consistent with prior asbestos exposure. Left 4th rib fracture. Xr Hip 2-3 Vw W Pelvis Right    Result Date: 11/29/2020  EXAMINATION: ONE XRAY VIEW OF THE PELVIS AND TWO XRAY VIEWS RIGHT HIP 11/29/2020 4:43 pm COMPARISON: Left hip radiograph 02/21/2020 HISTORY: ORDERING SYSTEM PROVIDED HISTORY: Fall TECHNOLOGIST PROVIDED HISTORY: Reason for exam:->Fall Reason for Exam: Fall (Pt was doing yard work in his field and fell down. Pt has R hip pain with some shortening and external rotation. Pt given 100 mcg of fentanyl IM via EMS while in route. Pt denies syncope or hitting head.) FINDINGS: Underpenetration due to patient body habitus. Diffuse osseous demineralization. Acute comminuted fracture of the right femoral intertrochanteric region with subtrochanteric extension, overriding of at least 1.3 cm, moderate medial displacement, and mild moderate medial angulation. Joints maintain anatomic alignment. No obvious acute soft tissue abnormality. Diffuse atherosclerotic calcifications. Brachytherapy seeds projecting over the prostate.      1. Acute comminuted intertrochanteric fracture of the right femur with subtrochanteric extension, overriding, displacement, and angulation. 2. Bony demineralization. ED COURSE/MDM  Patient seen and evaluated. Old records reviewed. Diagnostic testing reviewed and results discussed. I have seen and evaluated this patient with supervising physician: Luma Dominguez MD. We thoroughly discussed the history, physical exam, diagnostic testing, emergency department course, plan and disposition. Shanelle Seen Few presented to the ED today with above noted complaints. Physical exam does reveal right lateral hip tenderness to palpation, there is right lower extremity external rotation and slight shortening. Right lower extremity is distally neurovascularly intact. Remainder physical exam is otherwise unremarkable. There is no leukocytosis but absolute neutrophils are elevated at 7.8. No further differential shift. Stable anemia. No electrolyte abnormality. Creatinine is elevated at 1.8, this is elevated from most recent results and consistent with an JENISE. There is no evidence of transaminitis. PT/INR elevated as PT is 13.3, INR is within normal limits. Urinalysis does show blood but no evidence for infection. Chest x-ray shows a left fourth rib fracture. Otherwise without acute findings. X-ray of the right hip shows an acute comminuted intertrochanteric fracture of the right femur with subtrochanteric extension, overriding, displacement, and angulation. I did obtain a CT of the head and cervical spine and these are without acute findings. I did consult orthopedics and spoke with Dr. Carmina Pizano who was made aware of the above. I did tell her that he is on Plavix as well as there does appear to be a mild JENISE. She advised to make the patient n.p.o. after midnight and plan for surgery tomorrow afternoon.     While in ED patient received   Medications   morphine (PF) injection 4 mg (4 mg Intravenous Given 11/29/20 1820)   ondansetron (Aida Velez)

## 2020-11-29 NOTE — ED NOTES
Fall precautions in place. Bed alarm, fall socks and fall wristband in place.      Ema Vivas RN  11/29/20 1640

## 2020-11-30 ENCOUNTER — ANESTHESIA (OUTPATIENT)
Dept: OPERATING ROOM | Age: 85
DRG: 481 | End: 2020-11-30
Payer: MEDICARE

## 2020-11-30 ENCOUNTER — APPOINTMENT (OUTPATIENT)
Dept: GENERAL RADIOLOGY | Age: 85
DRG: 481 | End: 2020-11-30
Payer: MEDICARE

## 2020-11-30 ENCOUNTER — ANESTHESIA EVENT (OUTPATIENT)
Dept: OPERATING ROOM | Age: 85
DRG: 481 | End: 2020-11-30
Payer: MEDICARE

## 2020-11-30 VITALS
OXYGEN SATURATION: 100 % | RESPIRATION RATE: 2 BRPM | DIASTOLIC BLOOD PRESSURE: 55 MMHG | SYSTOLIC BLOOD PRESSURE: 105 MMHG

## 2020-11-30 LAB
ANION GAP SERPL CALCULATED.3IONS-SCNC: 11 MMOL/L (ref 3–16)
BASOPHILS ABSOLUTE: 0.1 K/UL (ref 0–0.2)
BASOPHILS RELATIVE PERCENT: 0.5 %
BUN BLDV-MCNC: 33 MG/DL (ref 7–20)
CALCIUM SERPL-MCNC: 8.8 MG/DL (ref 8.3–10.6)
CHLORIDE BLD-SCNC: 102 MMOL/L (ref 99–110)
CO2: 20 MMOL/L (ref 21–32)
CREAT SERPL-MCNC: 1.2 MG/DL (ref 0.8–1.3)
EKG ATRIAL RATE: 72 BPM
EKG DIAGNOSIS: NORMAL
EKG P AXIS: 85 DEGREES
EKG P-R INTERVAL: 236 MS
EKG Q-T INTERVAL: 402 MS
EKG QRS DURATION: 96 MS
EKG QTC CALCULATION (BAZETT): 440 MS
EKG R AXIS: 31 DEGREES
EKG T AXIS: 92 DEGREES
EKG VENTRICULAR RATE: 72 BPM
EOSINOPHILS ABSOLUTE: 0 K/UL (ref 0–0.6)
EOSINOPHILS RELATIVE PERCENT: 0.2 %
GFR AFRICAN AMERICAN: >60
GFR NON-AFRICAN AMERICAN: 57
GLUCOSE BLD-MCNC: 147 MG/DL (ref 70–99)
HCT VFR BLD CALC: 36.6 % (ref 40.5–52.5)
HEMOGLOBIN: 11.8 G/DL (ref 13.5–17.5)
LYMPHOCYTES ABSOLUTE: 1.2 K/UL (ref 1–5.1)
LYMPHOCYTES RELATIVE PERCENT: 11.4 %
MCH RBC QN AUTO: 32.7 PG (ref 26–34)
MCHC RBC AUTO-ENTMCNC: 32.1 G/DL (ref 31–36)
MCV RBC AUTO: 102 FL (ref 80–100)
MONOCYTES ABSOLUTE: 1.1 K/UL (ref 0–1.3)
MONOCYTES RELATIVE PERCENT: 10.9 %
NEUTROPHILS ABSOLUTE: 8 K/UL (ref 1.7–7.7)
NEUTROPHILS RELATIVE PERCENT: 77 %
PDW BLD-RTO: 13.7 % (ref 12.4–15.4)
PLATELET # BLD: 190 K/UL (ref 135–450)
PMV BLD AUTO: 8.8 FL (ref 5–10.5)
POTASSIUM REFLEX MAGNESIUM: 5 MMOL/L (ref 3.5–5.1)
RBC # BLD: 3.59 M/UL (ref 4.2–5.9)
SODIUM BLD-SCNC: 133 MMOL/L (ref 136–145)
WBC # BLD: 10.4 K/UL (ref 4–11)

## 2020-11-30 PROCEDURE — 2580000003 HC RX 258: Performed by: ORTHOPAEDIC SURGERY

## 2020-11-30 PROCEDURE — 93010 ELECTROCARDIOGRAM REPORT: CPT | Performed by: INTERNAL MEDICINE

## 2020-11-30 PROCEDURE — 3600000015 HC SURGERY LEVEL 5 ADDTL 15MIN: Performed by: ORTHOPAEDIC SURGERY

## 2020-11-30 PROCEDURE — 80048 BASIC METABOLIC PNL TOTAL CA: CPT

## 2020-11-30 PROCEDURE — 2580000003 HC RX 258: Performed by: NURSE PRACTITIONER

## 2020-11-30 PROCEDURE — 3600000005 HC SURGERY LEVEL 5 BASE: Performed by: ORTHOPAEDIC SURGERY

## 2020-11-30 PROCEDURE — 6360000002 HC RX W HCPCS: Performed by: ANESTHESIOLOGY

## 2020-11-30 PROCEDURE — 73552 X-RAY EXAM OF FEMUR 2/>: CPT

## 2020-11-30 PROCEDURE — 2709999900 HC NON-CHARGEABLE SUPPLY: Performed by: ORTHOPAEDIC SURGERY

## 2020-11-30 PROCEDURE — C1713 ANCHOR/SCREW BN/BN,TIS/BN: HCPCS | Performed by: ORTHOPAEDIC SURGERY

## 2020-11-30 PROCEDURE — 2720000010 HC SURG SUPPLY STERILE: Performed by: ORTHOPAEDIC SURGERY

## 2020-11-30 PROCEDURE — 2500000003 HC RX 250 WO HCPCS: Performed by: NURSE ANESTHETIST, CERTIFIED REGISTERED

## 2020-11-30 PROCEDURE — C1769 GUIDE WIRE: HCPCS | Performed by: ORTHOPAEDIC SURGERY

## 2020-11-30 PROCEDURE — 3700000001 HC ADD 15 MINUTES (ANESTHESIA): Performed by: ORTHOPAEDIC SURGERY

## 2020-11-30 PROCEDURE — 6360000002 HC RX W HCPCS: Performed by: PHYSICIAN ASSISTANT

## 2020-11-30 PROCEDURE — 36415 COLL VENOUS BLD VENIPUNCTURE: CPT

## 2020-11-30 PROCEDURE — 7100000001 HC PACU RECOVERY - ADDTL 15 MIN: Performed by: ORTHOPAEDIC SURGERY

## 2020-11-30 PROCEDURE — 0QS636Z REPOSITION RIGHT UPPER FEMUR WITH INTRAMEDULLARY INTERNAL FIXATION DEVICE, PERCUTANEOUS APPROACH: ICD-10-PCS | Performed by: ORTHOPAEDIC SURGERY

## 2020-11-30 PROCEDURE — 6360000002 HC RX W HCPCS: Performed by: NURSE ANESTHETIST, CERTIFIED REGISTERED

## 2020-11-30 PROCEDURE — 3209999900 FLUORO FOR SURGICAL PROCEDURES

## 2020-11-30 PROCEDURE — 6360000002 HC RX W HCPCS: Performed by: ORTHOPAEDIC SURGERY

## 2020-11-30 PROCEDURE — 1200000000 HC SEMI PRIVATE

## 2020-11-30 PROCEDURE — 7100000000 HC PACU RECOVERY - FIRST 15 MIN: Performed by: ORTHOPAEDIC SURGERY

## 2020-11-30 PROCEDURE — 85025 COMPLETE CBC W/AUTO DIFF WBC: CPT

## 2020-11-30 PROCEDURE — 6370000000 HC RX 637 (ALT 250 FOR IP): Performed by: NURSE PRACTITIONER

## 2020-11-30 PROCEDURE — 3700000000 HC ANESTHESIA ATTENDED CARE: Performed by: ORTHOPAEDIC SURGERY

## 2020-11-30 PROCEDURE — 6370000000 HC RX 637 (ALT 250 FOR IP): Performed by: ORTHOPAEDIC SURGERY

## 2020-11-30 PROCEDURE — 6360000002 HC RX W HCPCS: Performed by: NURSE PRACTITIONER

## 2020-11-30 DEVICE — LOCKING SCREW
Type: IMPLANTABLE DEVICE | Site: HIP | Status: FUNCTIONAL
Brand: T2 ALPHA

## 2020-11-30 DEVICE — LONG NAIL KIT R1.5, TI, RIGHT
Type: IMPLANTABLE DEVICE | Site: HIP | Status: FUNCTIONAL
Brand: GAMMA

## 2020-11-30 DEVICE — LAG SCREW, TI
Type: IMPLANTABLE DEVICE | Site: HIP | Status: FUNCTIONAL
Brand: GAMMA

## 2020-11-30 RX ORDER — HYDRALAZINE HYDROCHLORIDE 20 MG/ML
5 INJECTION INTRAMUSCULAR; INTRAVENOUS EVERY 10 MIN PRN
Status: DISCONTINUED | OUTPATIENT
Start: 2020-11-30 | End: 2020-11-30 | Stop reason: HOSPADM

## 2020-11-30 RX ORDER — FENTANYL CITRATE 50 UG/ML
INJECTION, SOLUTION INTRAMUSCULAR; INTRAVENOUS PRN
Status: DISCONTINUED | OUTPATIENT
Start: 2020-11-30 | End: 2020-11-30 | Stop reason: SDUPTHER

## 2020-11-30 RX ORDER — ONDANSETRON 2 MG/ML
INJECTION INTRAMUSCULAR; INTRAVENOUS PRN
Status: DISCONTINUED | OUTPATIENT
Start: 2020-11-30 | End: 2020-11-30 | Stop reason: SDUPTHER

## 2020-11-30 RX ORDER — OXYCODONE HYDROCHLORIDE AND ACETAMINOPHEN 5; 325 MG/1; MG/1
1 TABLET ORAL PRN
Status: DISCONTINUED | OUTPATIENT
Start: 2020-11-30 | End: 2020-11-30 | Stop reason: HOSPADM

## 2020-11-30 RX ORDER — LABETALOL HYDROCHLORIDE 5 MG/ML
5 INJECTION, SOLUTION INTRAVENOUS EVERY 10 MIN PRN
Status: DISCONTINUED | OUTPATIENT
Start: 2020-11-30 | End: 2020-11-30 | Stop reason: HOSPADM

## 2020-11-30 RX ORDER — CYCLOBENZAPRINE HCL 10 MG
10 TABLET ORAL 3 TIMES DAILY PRN
Status: DISCONTINUED | OUTPATIENT
Start: 2020-11-30 | End: 2020-12-11 | Stop reason: HOSPADM

## 2020-11-30 RX ORDER — PROPOFOL 10 MG/ML
INJECTION, EMULSION INTRAVENOUS PRN
Status: DISCONTINUED | OUTPATIENT
Start: 2020-11-30 | End: 2020-11-30 | Stop reason: SDUPTHER

## 2020-11-30 RX ORDER — PHENYLEPHRINE HCL IN 0.9% NACL 1 MG/10 ML
SYRINGE (ML) INTRAVENOUS PRN
Status: DISCONTINUED | OUTPATIENT
Start: 2020-11-30 | End: 2020-11-30 | Stop reason: SDUPTHER

## 2020-11-30 RX ORDER — LIDOCAINE HYDROCHLORIDE 20 MG/ML
INJECTION, SOLUTION INFILTRATION; PERINEURAL PRN
Status: DISCONTINUED | OUTPATIENT
Start: 2020-11-30 | End: 2020-11-30 | Stop reason: SDUPTHER

## 2020-11-30 RX ORDER — ROCURONIUM BROMIDE 10 MG/ML
INJECTION, SOLUTION INTRAVENOUS PRN
Status: DISCONTINUED | OUTPATIENT
Start: 2020-11-30 | End: 2020-11-30 | Stop reason: SDUPTHER

## 2020-11-30 RX ORDER — SODIUM CHLORIDE 0.9 % (FLUSH) 0.9 %
10 SYRINGE (ML) INJECTION PRN
Status: DISCONTINUED | OUTPATIENT
Start: 2020-11-30 | End: 2020-12-11 | Stop reason: HOSPADM

## 2020-11-30 RX ORDER — SODIUM CHLORIDE 0.9 % (FLUSH) 0.9 %
10 SYRINGE (ML) INJECTION EVERY 12 HOURS SCHEDULED
Status: DISCONTINUED | OUTPATIENT
Start: 2020-11-30 | End: 2020-12-11 | Stop reason: HOSPADM

## 2020-11-30 RX ORDER — DEXAMETHASONE SODIUM PHOSPHATE 4 MG/ML
INJECTION, SOLUTION INTRA-ARTICULAR; INTRALESIONAL; INTRAMUSCULAR; INTRAVENOUS; SOFT TISSUE PRN
Status: DISCONTINUED | OUTPATIENT
Start: 2020-11-30 | End: 2020-11-30 | Stop reason: SDUPTHER

## 2020-11-30 RX ORDER — KETOROLAC TROMETHAMINE 30 MG/ML
20 INJECTION, SOLUTION INTRAMUSCULAR; INTRAVENOUS ONCE
Status: COMPLETED | OUTPATIENT
Start: 2020-11-30 | End: 2020-11-30

## 2020-11-30 RX ORDER — MAGNESIUM HYDROXIDE 1200 MG/15ML
LIQUID ORAL CONTINUOUS PRN
Status: COMPLETED | OUTPATIENT
Start: 2020-11-30 | End: 2020-11-30

## 2020-11-30 RX ORDER — ONDANSETRON 2 MG/ML
4 INJECTION INTRAMUSCULAR; INTRAVENOUS EVERY 10 MIN PRN
Status: DISCONTINUED | OUTPATIENT
Start: 2020-11-30 | End: 2020-11-30 | Stop reason: HOSPADM

## 2020-11-30 RX ORDER — OXYCODONE HYDROCHLORIDE 5 MG/1
5 TABLET ORAL EVERY 4 HOURS PRN
Status: DISCONTINUED | OUTPATIENT
Start: 2020-11-30 | End: 2020-12-03

## 2020-11-30 RX ORDER — KETOROLAC TROMETHAMINE 30 MG/ML
INJECTION, SOLUTION INTRAMUSCULAR; INTRAVENOUS
Status: DISPENSED
Start: 2020-11-30 | End: 2020-12-01

## 2020-11-30 RX ORDER — SENNA AND DOCUSATE SODIUM 50; 8.6 MG/1; MG/1
1 TABLET, FILM COATED ORAL 2 TIMES DAILY
Status: DISCONTINUED | OUTPATIENT
Start: 2020-11-30 | End: 2020-12-11 | Stop reason: HOSPADM

## 2020-11-30 RX ORDER — OXYCODONE HYDROCHLORIDE AND ACETAMINOPHEN 5; 325 MG/1; MG/1
2 TABLET ORAL PRN
Status: DISCONTINUED | OUTPATIENT
Start: 2020-11-30 | End: 2020-11-30 | Stop reason: HOSPADM

## 2020-11-30 RX ORDER — MEPERIDINE HYDROCHLORIDE 50 MG/ML
12.5 INJECTION INTRAMUSCULAR; INTRAVENOUS; SUBCUTANEOUS EVERY 5 MIN PRN
Status: DISCONTINUED | OUTPATIENT
Start: 2020-11-30 | End: 2020-11-30 | Stop reason: HOSPADM

## 2020-11-30 RX ORDER — 0.9 % SODIUM CHLORIDE 0.9 %
500 INTRAVENOUS SOLUTION INTRAVENOUS ONCE
Status: COMPLETED | OUTPATIENT
Start: 2020-11-30 | End: 2020-11-30

## 2020-11-30 RX ADMIN — Medication 100 MCG: at 16:00

## 2020-11-30 RX ADMIN — MORPHINE SULFATE 4 MG: 4 INJECTION, SOLUTION INTRAMUSCULAR; INTRAVENOUS at 04:20

## 2020-11-30 RX ADMIN — KETOROLAC TROMETHAMINE 20 MG: 30 INJECTION, SOLUTION INTRAMUSCULAR at 17:49

## 2020-11-30 RX ADMIN — ROCURONIUM BROMIDE 50 MG: 10 SOLUTION INTRAVENOUS at 15:51

## 2020-11-30 RX ADMIN — CLOPIDOGREL BISULFATE 75 MG: 75 TABLET ORAL at 08:51

## 2020-11-30 RX ADMIN — SUGAMMADEX 200 MG: 100 INJECTION, SOLUTION INTRAVENOUS at 16:48

## 2020-11-30 RX ADMIN — MORPHINE SULFATE 4 MG: 4 INJECTION, SOLUTION INTRAMUSCULAR; INTRAVENOUS at 18:51

## 2020-11-30 RX ADMIN — SODIUM CHLORIDE, PRESERVATIVE FREE 10 ML: 5 INJECTION INTRAVENOUS at 08:52

## 2020-11-30 RX ADMIN — CEFAZOLIN SODIUM 2 G: 10 INJECTION, POWDER, FOR SOLUTION INTRAVENOUS at 23:53

## 2020-11-30 RX ADMIN — CYCLOBENZAPRINE 10 MG: 10 TABLET, FILM COATED ORAL at 02:05

## 2020-11-30 RX ADMIN — TRAMADOL HYDROCHLORIDE 100 MG: 50 TABLET, COATED ORAL at 02:06

## 2020-11-30 RX ADMIN — SIMVASTATIN 20 MG: 10 TABLET, FILM COATED ORAL at 23:52

## 2020-11-30 RX ADMIN — TRAMADOL HYDROCHLORIDE 100 MG: 50 TABLET, COATED ORAL at 08:51

## 2020-11-30 RX ADMIN — PROPOFOL 120 MG: 10 INJECTION, EMULSION INTRAVENOUS at 15:51

## 2020-11-30 RX ADMIN — SODIUM CHLORIDE: 9 INJECTION, SOLUTION INTRAVENOUS at 18:56

## 2020-11-30 RX ADMIN — ONDANSETRON 4 MG: 2 INJECTION INTRAMUSCULAR; INTRAVENOUS at 16:16

## 2020-11-30 RX ADMIN — CEFAZOLIN SODIUM 2 G: 10 INJECTION, POWDER, FOR SOLUTION INTRAVENOUS at 15:51

## 2020-11-30 RX ADMIN — DEXAMETHASONE SODIUM PHOSPHATE 10 MG: 4 INJECTION, SOLUTION INTRAMUSCULAR; INTRAVENOUS at 16:16

## 2020-11-30 RX ADMIN — FENTANYL CITRATE 100 MCG: 50 INJECTION INTRAMUSCULAR; INTRAVENOUS at 15:51

## 2020-11-30 RX ADMIN — ATENOLOL 25 MG: 25 TABLET ORAL at 08:51

## 2020-11-30 RX ADMIN — SODIUM CHLORIDE: 9 INJECTION, SOLUTION INTRAVENOUS at 12:32

## 2020-11-30 RX ADMIN — SODIUM CHLORIDE 500 ML: 9 INJECTION, SOLUTION INTRAVENOUS at 19:53

## 2020-11-30 RX ADMIN — LIDOCAINE HYDROCHLORIDE 60 MG: 20 INJECTION, SOLUTION INFILTRATION; PERINEURAL at 15:51

## 2020-11-30 ASSESSMENT — PAIN DESCRIPTION - LOCATION
LOCATION: HIP

## 2020-11-30 ASSESSMENT — PAIN SCALES - GENERAL
PAINLEVEL_OUTOF10: 10
PAINLEVEL_OUTOF10: 8
PAINLEVEL_OUTOF10: 8
PAINLEVEL_OUTOF10: 10
PAINLEVEL_OUTOF10: 7
PAINLEVEL_OUTOF10: 10
PAINLEVEL_OUTOF10: 8
PAINLEVEL_OUTOF10: 0
PAINLEVEL_OUTOF10: 10

## 2020-11-30 ASSESSMENT — PULMONARY FUNCTION TESTS
PIF_VALUE: 1
PIF_VALUE: 16
PIF_VALUE: 11
PIF_VALUE: 2
PIF_VALUE: 16
PIF_VALUE: 0
PIF_VALUE: 17
PIF_VALUE: 14
PIF_VALUE: 17
PIF_VALUE: 17
PIF_VALUE: 14
PIF_VALUE: 17
PIF_VALUE: 17
PIF_VALUE: 0
PIF_VALUE: 0
PIF_VALUE: 2
PIF_VALUE: 0
PIF_VALUE: 17
PIF_VALUE: 2
PIF_VALUE: 17
PIF_VALUE: 16
PIF_VALUE: 14
PIF_VALUE: 2
PIF_VALUE: 0
PIF_VALUE: 13
PIF_VALUE: 16
PIF_VALUE: 0
PIF_VALUE: 17
PIF_VALUE: 0
PIF_VALUE: 17
PIF_VALUE: 14
PIF_VALUE: 3
PIF_VALUE: 2
PIF_VALUE: 17
PIF_VALUE: 14
PIF_VALUE: 0
PIF_VALUE: 1
PIF_VALUE: 2
PIF_VALUE: 17
PIF_VALUE: 17
PIF_VALUE: 16
PIF_VALUE: 17
PIF_VALUE: 0
PIF_VALUE: 17
PIF_VALUE: 17
PIF_VALUE: 5
PIF_VALUE: 1
PIF_VALUE: 16
PIF_VALUE: 2
PIF_VALUE: 17
PIF_VALUE: 17
PIF_VALUE: 16
PIF_VALUE: 2
PIF_VALUE: 0
PIF_VALUE: 16
PIF_VALUE: 17
PIF_VALUE: 17
PIF_VALUE: 0
PIF_VALUE: 15
PIF_VALUE: 15
PIF_VALUE: 2
PIF_VALUE: 0
PIF_VALUE: 2
PIF_VALUE: 16
PIF_VALUE: 15
PIF_VALUE: 2
PIF_VALUE: 17
PIF_VALUE: 0
PIF_VALUE: 0
PIF_VALUE: 17
PIF_VALUE: 17
PIF_VALUE: 16
PIF_VALUE: 17
PIF_VALUE: 0
PIF_VALUE: 1
PIF_VALUE: 0
PIF_VALUE: 15
PIF_VALUE: 1
PIF_VALUE: 16
PIF_VALUE: 2
PIF_VALUE: 17
PIF_VALUE: 17
PIF_VALUE: 16
PIF_VALUE: 17

## 2020-11-30 ASSESSMENT — PAIN DESCRIPTION - ORIENTATION
ORIENTATION: RIGHT

## 2020-11-30 ASSESSMENT — PAIN DESCRIPTION - PAIN TYPE
TYPE: ACUTE PAIN
TYPE: SURGICAL PAIN

## 2020-11-30 ASSESSMENT — PAIN - FUNCTIONAL ASSESSMENT: PAIN_FUNCTIONAL_ASSESSMENT: 0-10

## 2020-11-30 NOTE — CARE COORDINATION
CASE MANAGEMENT INITIAL ASSESSMENT      Reviewed chart and completed assessment  With: Pt  Explained Case Management role/services. Primary contact information:Spouse Mey Gomez [phone]    Health Care Decision Maker:   Who do you trust or have selected to make healthcare decisions for you? Name: Sravan Schrader [phone]  Can this person be reached and be able to respond quickly, such as within a few minutes or hours? Yes  Who would be your back-up decision maker? Name: Jeol Corbin [phone]. Admit date/status: IP, 11/30/20  Diagnosis: Closed Right Hip Fracture. Is this a Readmission?:  No      Insurance: Medicare Primary and [address] [employer] Fund I. Precert required for SNF: No       3 night stay required: No    Living arrangements, Adls, care needs, prior to admission: Lives in a one story house with wife no SE. Independent in all ADL's, drives and still very active. Transportation:family     Durable Medical Equipment at home:  Walker_X_Cane__RTS__ BSC__Shower Chair__  02__ HHN__ CPAP__  BiPap__  Hospital Bed__ W/C___ Other__________    Services in the home and/or outpatient, prior to admission: None      PT/OT recs: None seen at this time. Hospital Exemption Notification (HEN): Needed for SNF, not initiated. Barriers to discharge: None    Plan/comments: CM met with pt at bedside for initial assessment. Spoke to Wife and Son on the phone. Per son pt will have little help at home. Son lives in [place] and cannot stay with parents for a long period of time. Wife is the only continued help at home. Going to OR today for IM nailing with Dr. Stokes Nones. Will follow for therapy recs post-op.  Reymundo Tucker RN      ECOC on chart for MD signature

## 2020-11-30 NOTE — CONSULTS
Inpatient Consultation    Marek Corbin (12/12/1929)  11/30/2020 Date of consult    Reason for Consult:  Right femur fracture  Requesting Physician: MICHAEL Granda CNP     CHIEF COMPLAINT:  As above    History Obtained From:  patient, electronic medical record    HISTORY OF PRESENT ILLNESS:                The patient is a 80 y.o. male who presents with above chief complaint. Patient was getting off his tractor when he fell onto his right side, brought to ED by squad with pain in right hip and inability to ambulate. Patient remains quite active, lives independently with his wife and ambulates without assistive devices at baseline. Otherwise healthy. Denies head trauma or LOC. Past Medical History:        Diagnosis Date    Cancer [facility])     Bladder and skin per pt       Past Surgical History:    History reviewed. No pertinent surgical history.     Current Medications:   Current Facility-Administered Medications: cyclobenzaprine (FLEXERIL) tablet 10 mg, 10 mg, Oral, TID PRN  morphine (PF) injection 4 mg, 4 mg, Intravenous, Q30 Min PRN  0.9 % sodium chloride infusion, , Intravenous, Continuous  aspirin EC tablet 325 mg, 325 mg, Oral, Daily  atenolol (TENORMIN) tablet 25 mg, 25 mg, Oral, Daily  clopidogrel (PLAVIX) tablet 75 mg, 75 mg, Oral, Daily  lisinopril (PRINIVIL;ZESTRIL) tablet 2.5 mg, 2.5 mg, Oral, Daily  simvastatin (ZOCOR) tablet 20 mg, 20 mg, Oral, Nightly  sodium chloride flush 0.9 % injection 10 mL, 10 mL, Intravenous, 2 times per day  sodium chloride flush 0.9 % injection 10 mL, 10 mL, Intravenous, PRN  acetaminophen (TYLENOL) tablet 650 mg, 650 mg, Oral, Q6H PRN **OR** acetaminophen (TYLENOL) suppository 650 mg, 650 mg, Rectal, Q6H PRN  polyethylene glycol (GLYCOLAX) packet 17 g, 17 g, Oral, Daily PRN  promethazine (PHENERGAN) tablet 12.5 mg, 12.5 mg, Oral, Q6H PRN **OR** ondansetron (ZOFRAN) injection 4 mg, 4 mg, Intravenous, Q6H PRN  enoxaparin (LOVENOX) injection 30 mg, 30 mg, Subcutaneous, Daily  0.9 % sodium chloride infusion, , Intravenous, Continuous  traMADol (ULTRAM) tablet 50 mg, 50 mg, Oral, Q6H PRN **OR** traMADol (ULTRAM) tablet 100 mg, 100 mg, Oral, Q6H PRN  morphine (PF) injection 2 mg, 2 mg, Intravenous, Q2H PRN **OR** morphine (PF) injection 4 mg, 4 mg, Intravenous, Q2H PRN    Allergies:  Patient has no known allergies. Social History:   TOBACCO:   reports that he has never smoked. He has never used smokeless tobacco.  ETOH:   reports no history of alcohol use. DRUGS:   reports no history of drug use. Family History:   History reviewed. No pertinent family history. REVIEW OF SYSTEMS:    CONSTITUTIONAL:  negative  RESPIRATORY:  negative  CARDIOVASCULAR:  negative  MUSCULOSKELETAL:  positive for  pain    PHYSICAL EXAM:      General: alert, appears stated age and cooperative   Skin: Skin intact, No Erythema and Positive for Edema   Extremity: Distal NVI   DVT Exam: No evidence of DVT seen on physical exam.  Negative Gelacio's sign. No significant calf/ankle edema.      RLE: + dorsal pedis and posterior tibial pulse palpable, +sensation intact to light touch L4-S1, thigh and calf compartments soft and compressible, motor function intact ehl, df, pf.   Good cap refill <2 sec  Pain with log roll, leg short and externally rotated    DATA:        CBC with Differential:    Lab Results   Component Value Date    WBC 10.4 11/30/2020    RBC 3.59 11/30/2020    HGB 11.8 11/30/2020    HCT 36.6 11/30/2020     11/30/2020    .0 11/30/2020    MCH 32.7 11/30/2020    MCHC 32.1 11/30/2020    RDW 13.7 11/30/2020    LYMPHOPCT 11.4 11/30/2020    MONOPCT 10.9 11/30/2020    BASOPCT 0.5 11/30/2020    MONOSABS 1.1 11/30/2020    LYMPHSABS 1.2 11/30/2020    EOSABS 0.0 11/30/2020    BASOSABS 0.1 11/30/2020     CMP:    Lab Results   Component Value Date     11/30/2020    K 5.0 11/30/2020     11/30/2020    CO2 20 11/30/2020    BUN 33 11/30/2020    CREATININE 1.2 11/30/2020 GFRAA >60 11/30/2020    AGRATIO 1.5 11/29/2020    LABGLOM 57 11/30/2020    GLUCOSE 147 11/30/2020    PROT 6.8 11/29/2020    CALCIUM 8.8 11/30/2020    BILITOT 0.6 11/29/2020    ALKPHOS 96 11/29/2020    AST 23 11/29/2020    ALT 13 11/29/2020     BMP:    Lab Results   Component Value Date     11/30/2020    K 5.0 11/30/2020     11/30/2020    CO2 20 11/30/2020    BUN 33 11/30/2020    CREATININE 1.2 11/30/2020    CALCIUM 8.8 11/30/2020    GFRAA >60 11/30/2020    LABGLOM 57 11/30/2020    GLUCOSE 147 11/30/2020       Radiology:     @  CT Head WO Contrast   Final Result   No acute intracranial abnormality. CT Cervical Spine WO Contrast   Final Result   No acute abnormality of the cervical spine. XR CHEST PORTABLE   Final Result   Left 4th rib fracture. XR HIP 2-3 VW W PELVIS RIGHT   Final Result   1. Acute comminuted intertrochanteric fracture of the right femur with   subtrochanteric extension, overriding, displacement, and angulation. 2. Bony demineralization. XR FEMUR RIGHT (MIN 2 VIEWS)    (Results Pending)         IMPRESSION/RECOMMENDATIONS:    Assessment: right IT femur fracture     Plan:  1) Imaging findings reviewed with patient. Patient sustained a comminuted, displaced, unstable right femur IT fracture, Conservative and surgical treatment options discussed. After discussion today with the patient they elected to proceed with surgical intervention. The surgical procedure was discussed in detail. The risks and possible complications of the surgery in general, as well as those directly related to this procedure were reviewed today. General risks include but are not limited to persistent pain, infection, excessive blood loss, neurovascular injury, chronic swelling or edema, and the potential need for additional treatment or surgical intervention in the future.  The patient understands that, again, the risks and possible complications are not limited to those specifically stated above. Due to the patients advanced age and osteopenia they are at increased risk for hardware failure, non union and death. The patient accepted the above risks, possible complications and elects to proceed. All questions were answered appropriately, and they understand that they can contact me at any time to further discuss any questions or concerns. I recommend long cephlomedullary nail fixation due to the unstable nature of the fracture and their bone qualtiy. 2) NPO, consent obtained, abx on call to OR  3) patient medically optimized  4) will continue to follow post operatively, will need  for D/C planning        Thank you for the opportunity to consult on this patient.      Terra Sanchez

## 2020-11-30 NOTE — OP NOTE
Yariel Corbin (12/12/1929)    Surgery Date 11/30/2020    Intertrochanteric Hip Fracture, Intramedullary Fixation    Preoperative Diagnosis-   Intertrochanteric right hip fracture    Postoperative Diagnosis-  Intertrochanteric right hip fracture    Procedure-  1. Closed Reduction, Intramedullary nail  right hip ([id number])                      2.  Intraoperative X-ray exam right hip with interpretation ([id number])             Surgeon- Trenna Shone, DO    Primary Assistant(s)-  Surgical Assistant: Blanka Israel  Scrub Person First: Sidney Phipps    Anesthesia- General    EBL- Less than 100 ml    Implants- Arvin- trochanteric femoral nail  11 x 125 x 420 mm;  100 mm lag screw, 50, 52 mm 5.0 locking screw    Condition: Stable to the PACU    Surgical Indications  The patient presented to the emergency room and was diagnosed with a right Intertrochanteric hip fracture. The patient was admitted to the hospital and received medical clearance. The patient and their family chose to proceed with closed reduction as needed with intramedullary nailing. Risks, benefits, expected outcomes and potential complications were discussed. At no time were any guarantees implied or stated. The patient electively signed the consent form. Patient Positioning and Surgical Prep  The patient was seen in the holding area and the appropriate extremity marked with an indelible pen. The patient was taken to the operative suite, identified and while on the hospital bed, spinal anesthesia was administered. The patient was transferred to the fracture table. The affected leg was placed in boot traction after the foot was well padded. The unaffected leg was gently abducted and externally rotated. The fracture was well visualized using biplanar fluoroscopy and the fracture reduced or manipulated as required. The lower extremity was prepped from the flank to knee with Duraprep and then draped in the normal sterile fashion. Exposure  An incision was made proximal to the greater trochanter. The fascia and muscle were split in line with their fibers. Using biplanar, c-arm fluoroscopy. A starting hole was identified at the tip of the greater trochanter. A guide wire was inserted and a 17mm cannulated reamer introduced to open the proximal femur. The long reaming guide wire was inserted into the medullary canal and the proximal femur reamed as necessary. The long trochanteric femoral nail was attached to the insertion handle and inserted over the reaming guide wire. The guide wire was removed. An incision was made along the lateral aspect of the thigh and through the fascia. The lag screw sleeve was inserted and snapped into the aiming guide. The sleeve assembly was advanced to the lateral femur. A 3.2 mm guide wire was drilled into the femoral head and the length for the lag screw was measured. The 11.0 cannulated drill was used to ream out the lateral cortex. The lag screws was assembled to the  and screwed into place. Using the flexible screwdriver, the preassembled locking mechanism was engaged by advancing the screw. The  and aiming guide were disengaged. A distal incision was made along the lateral thigh over the distal static screw hole. A 4.2 mm calibrated drill bit was drilled through the nail hole across both cortices. The length was measured and a 5.0 mm locking screw inserted. Final fluoroscopic views were obtained. Closure and Disposition  The wounds were copiously irrigated and closed in layers. Sterile dressings were applied. All sponge and needle counts were correct. The patient was awakened and taken to the postoperative area in stable condition. I spoke with the patients family in the consultation room postoperatively. Postop management  The patient will begin anticoagulation for DVT/PE prevention and be non WB weightbearing with physical therapy POD #1.  The hospitalist will resume medical management and social work will be contacted for discharge planning. I will continue to follow the patient closely throughout their hospital stay. ADDENDUM:  Two radiographic fluoroscopic views (AP and Lateral) of the  right hip were obtained case to confirm satisfactory fracture reduction and placement of all hardware.       Pallavi Nassar

## 2020-11-30 NOTE — ANESTHESIA POSTPROCEDURE EVALUATION
Department of Anesthesiology  Postprocedure Note    Patient: Asif Wells  MRN: [id number]  YOB: 1929  Date of evaluation: 11/30/2020  Time:  6:45 PM     Procedure Summary     Date:  11/30/20 Room / Location:  [facility]    Anesthesia Start:  2286 Anesthesia Stop:  1714    Procedure:  FEMUR INTRAMEDULLARY NAIL BRITTNEY INSERTION RIGHT (Right Hip) Diagnosis:  (FEMUR FRACTURE RIGHT)    Surgeon:  Bello Jeong DO Responsible Provider:  Jay Major MD    Anesthesia Type:  general ASA Status:  3          Anesthesia Type: general    Sterling Phase I: Sterling Score: 9    Sterling Phase II:      Last vitals: Reviewed and per EMR flowsheets.        Anesthesia Post Evaluation    Patient location during evaluation: PACU  Level of consciousness: awake  Airway patency: patent  Nausea & Vomiting: no nausea  Complications: no  Cardiovascular status: blood pressure returned to baseline  Respiratory status: acceptable  Hydration status: euvolemic

## 2020-11-30 NOTE — PLAN OF CARE
Problem: Nutrition  Goal: Optimal nutrition therapy  Outcome: Ongoing  Note: Nutrition Problem #1: Increased nutrient needs  Intervention: Food and/or Nutrient Delivery: Start Oral Diet, Start Oral Nutrition Supplement  Nutritional Goals: Pt will have po intakes 50% or greater this admission

## 2020-11-30 NOTE — H&P
mouth daily    Historical Provider, MD   clopidogrel (PLAVIX) 75 MG tablet Take 75 mg by mouth daily    Historical Provider, MD   lisinopril (PRINIVIL;ZESTRIL) 2.5 MG tablet Take 2.5 mg by mouth daily    Historical Provider, MD   atenolol (TENORMIN) 25 MG tablet Take 25 mg by mouth daily    Historical Provider, MD   simvastatin (ZOCOR) 20 MG tablet Take 20 mg by mouth nightly    Historical Provider, MD     Allergies:  Patient has no known allergies. Social History:      The patient currently lives at home    TOBACCO:   reports that he has never smoked. He has never used smokeless tobacco.  ETOH:   reports no history of alcohol use. E-Cigarettes Vaping or Juuling     Questions Responses    Vaping Use     Start Date     Does device contain nicotine? Quit Date     Vaping Type         Family History:      History reviewed. No pertinent family history. REVIEW OF SYSTEMS:   Pertinent positives as noted in the HPI. All other systems reviewed and negative. PHYSICAL EXAM PERFORMED:    BP (!) 161/69   Pulse 68   Temp 98.1 °F (36.7 °C) (Oral)   Resp 17   Ht 5' 11\" (1.803 m)   Wt 175 lb (79.4 kg)   SpO2 100%   BMI 24.41 kg/m²     General appearance:  Pleasant, elderly male in no apparent distress, appears stated age and cooperative. HEENT:  Pupils equal, round, and reactive to light. Extra ocular muscles intact. Conjunctivae/corneas clear. Neck: Supple, with full range of motion. No jugular venous distention. Trachea midline. Respiratory:  Normal respiratory effort. Clear to auscultation, bilaterally without Rales/Wheezes/Rhonchi. Cardiovascular:  Regular rate and rhythm with normal S1/S2 without murmurs, rubs or gallops. Abdomen: Soft, non-tender, non-distended with normal bowel sounds. Musculoskeletal:  No clubbing, cyanosis or edema bilaterally. Full range of motion without deformity. Skin: Skin color, texture, turgor normal.  No significant rashes or lesions.   Neurologic:  Neurovascularly intact without any focal sensory/motor deficits. Cranial nerves: II-XII intact, grossly non-focal.  Psychiatric:  Alert and oriented, thought content appropriate, normal insight  Capillary Refill: Brisk,< 3 seconds   Peripheral Pulses: +2 palpable, equal bilaterally       Labs:     Recent Labs     11/29/20  1641   WBC 10.5   HGB 11.8*   HCT 34.7*        Recent Labs     11/29/20  1641      K 4.3      CO2 26   BUN 40*   CREATININE 1.8*   CALCIUM 8.8     Recent Labs     11/29/20  1641   AST 23   ALT 13   BILITOT 0.6   ALKPHOS 96     Recent Labs     11/29/20  1641   INR 1.14     Urinalysis:      Lab Results   Component Value Date    NITRU Negative 03/29/2018    WBCUA 0-2 03/29/2018    RBCUA 20-50 03/29/2018    BLOODU LARGE 03/29/2018    SPECGRAV 1.010 03/29/2018    GLUCOSEU Negative 03/29/2018     Radiology:     CXR: I have reviewed the CXR with the following interpretation: left 4th rib fracture    EKG:  I have reviewed the EKG with the following interpretation: The Ekg interpreted by me in the absence of a cardiologist shows. Sinus rhythm with 1st degree A-V block. Possible Inferior infarct (cited on or before 29-MAR-2018) Abnormal ECG. When compared with ECG of 29-MAR-2018 07:14, Nonspecific T wave abnormality has replaced inverted T waves in Lateral leads     CT Head WO Contrast   Final Result   No acute intracranial abnormality. CT Cervical Spine WO Contrast   Final Result   No acute abnormality of the cervical spine. XR CHEST PORTABLE   Final Result   Left 4th rib fracture. XR HIP 2-3 VW W PELVIS RIGHT   Final Result   1. Acute comminuted intertrochanteric fracture of the right femur with   subtrochanteric extension, overriding, displacement, and angulation. 2. Bony demineralization.            ASSESSMENT:    Active Hospital Problems    Diagnosis Date Noted    Closed right hip fracture ([facility] [address] [S72.001A] 11/29/2020    JENISE (acute kidney injury) ([facility] [address] [N17.9] 11/29/2020    HTN (hypertension) [I10] 03/29/2018     PLAN:    Right hip pain in setting of right hip fracture d/t mechanical fall  -X-ray right pelvis revealed acute comminuted intertrochanteric fracture of the right femur with subtrochanteric extension, overriding, displacement and angulation  -CT cervical spine revealed no acute abnormality of cervical spine  -CT head revealed no acute intracranial abnormality  -Chest x-ray revealed a left fourth rib fracture  -morphine given in ED  -NPO after MN  -orthopedic surgery consulted in ED - appreciate recommendations in advance  -strict bed rest  -PRN pain medication  -Based on the evaluation on 11/29/2020, and diagnostic testing including EKG, there are no apparent cardiac contraindications to the proposed procedure. All questions/concerns that could be addressed were addressed. Encourage use of incentive spirometry although patient is at low risk for perioperative cardiopulmonary complication. Sulma Lyon estimated risk probability for perioperative MI or cardiac arrest is 0.47%. The patient appears to be an appropriate candidate for surgery if ortho deemed necessary. JENISE, CR 1.8 on admission  -likely 2/2 poor po intake  -1 L NS given in ED  -monitor with IVF  -bmp in am    Essential HTN  -continue atenolol and lisinopril  -continue asa and plavix    HLD  -continue simvastatin    Chronic normocytic anemia, 11.8/34.7 on admission  -no s/s of bleeding at this time  -cbc in am    DVT Prophylaxis: Lovenox    Diet: Diet NPO, After Midnight     Code Status: Prior    PT/OT Eval Status: not ordered at this time    Dispo - 2-3 days pending clinical improvement     Dixie Bey, APRN - CNP    Thank you Saeed Gonzalez MD for the opportunity to be involved in this patient's care.  If you have any questions or concerns please feel free to contact me at [phone].  ----------------------------Anticipated Dr. Risih jurado---------------------------------

## 2020-11-30 NOTE — PROGRESS NOTES
intact without any focal sensory/motor deficits. Cranial nerves: II-XII intact, grossly non-focal.  Psychiatric: Alert and oriented, thought content appropriate, normal insight  Capillary Refill: Brisk,< 3 seconds   Peripheral Pulses: +2 palpable, equal bilaterally       Labs:   Recent Labs     11/29/20  1641 11/30/20  0815   WBC 10.5 10.4   HGB 11.8* 11.8*   HCT 34.7* 36.6*    190     Recent Labs     11/29/20  1641 11/30/20  0815    133*   K 4.3 5.0    102   CO2 26 20*   BUN 40* 33*   CREATININE 1.8* 1.2   CALCIUM 8.8 8.8     Recent Labs     11/29/20  1641   AST 23   ALT 13   BILITOT 0.6   ALKPHOS 96     Recent Labs     11/29/20  1641   INR 1.14     No results for input(s): Jass Clap in the last 72 hours. Urinalysis:      Lab Results   Component Value Date    NITRU Negative 11/29/2020    WBCUA 0-2 11/29/2020    BACTERIA Rare 11/29/2020    RBCUA 11-20 11/29/2020    BLOODU MODERATE 11/29/2020    SPECGRAV >=1.030 11/29/2020    GLUCOSEU Negative 11/29/2020       Radiology:  CT Head WO Contrast   Final Result   No acute intracranial abnormality. CT Cervical Spine WO Contrast   Final Result   No acute abnormality of the cervical spine. XR CHEST PORTABLE   Final Result   Left 4th rib fracture. XR HIP 2-3 VW W PELVIS RIGHT   Final Result   1. Acute comminuted intertrochanteric fracture of the right femur with   subtrochanteric extension, overriding, displacement, and angulation. 2. Bony demineralization.          XR FEMUR RIGHT (MIN 2 VIEWS)    (Results Pending)           Assessment/Plan:    Active Hospital Problems    Diagnosis    Closed right hip fracture ([facility] [address] [S72.001A]    JENISE (acute kidney injury) ([facility] [address] [N17.9]    Closed right hip fracture, initial encounter ([facility] [address] Raenette Rinne    HTN (hypertension) [I10]    Hyperlipidemia [E78.5]         Right hip pain in setting of right hip fracture d/t mechanical fall  -X-ray right pelvis revealed acute comminuted

## 2020-11-30 NOTE — ANESTHESIA PRE PROCEDURE
Department of Anesthesiology  Preprocedure Note       Name:  Moiz Sanford   Age:  80 y.o.  :  1929                                          MRN:  [id number]         Date:  2020      Surgeon: Darryn Pimentel):  Pallavi Nassar DO    Procedure: Procedure(s): FEMUR IM NAIL BRITTNEY INSERTION RIGHT    Medications prior to admission:   Prior to Admission medications    Medication Sig Start Date End Date Taking?  Authorizing Provider   aspirin 325 MG EC tablet Take 325 mg by mouth daily    Historical Provider, MD   clopidogrel (PLAVIX) 75 MG tablet Take 75 mg by mouth daily    Historical Provider, MD   lisinopril (PRINIVIL;ZESTRIL) 2.5 MG tablet Take 2.5 mg by mouth daily    Historical Provider, MD   atenolol (TENORMIN) 25 MG tablet Take 25 mg by mouth daily    Historical Provider, MD   simvastatin (ZOCOR) 20 MG tablet Take 20 mg by mouth nightly    Historical Provider, MD       Current medications:    Current Facility-Administered Medications   Medication Dose Route Frequency Provider Last Rate Last Dose    cyclobenzaprine (FLEXERIL) tablet 10 mg  10 mg Oral TID PRN Eual Frieze, APRN - CNP   10 mg at 20 0205    ceFAZolin (ANCEF) 2 g in dextrose 5 % 100 mL IVPB  2 g Intravenous On Call to [address] [place] [address]        morphine (PF) injection 4 mg  4 mg Intravenous Q30 Min PRN Alis Hoffmann APRN - CNP   4 mg at 20    0.9 % sodium chloride infusion   Intravenous Continuous Alis Hoffmann APRN -  mL/hr at 20      aspirin EC tablet 325 mg  325 mg Oral Daily Tabby Marx, APRN - CNP        atenolol (TENORMIN) tablet 25 mg  25 mg Oral Daily Eual Frieze, APRN - CNP   25 mg at 20 0851    clopidogrel (PLAVIX) tablet 75 mg  75 mg Oral Daily Eual Frieze, APRN - CNP   75 mg at 20 0851    lisinopril (PRINIVIL;ZESTRIL) tablet 2.5 mg  2.5 mg Oral Daily Eual Frieze, APRN - CNP        simvastatin (ZOCOR) tablet 20 mg  20 mg Oral Nightly Eual Frieze, APRN - CNP       Anna Mcdaniel sodium chloride flush 0.9 % injection 10 mL  10 mL Intravenous 2 times per day Elsa Sandra, APRN - CNP   10 mL at 11/30/20 7817    sodium chloride flush 0.9 % injection 10 mL  10 mL Intravenous PRN Elsa Sandra, APRN - CNP        acetaminophen (TYLENOL) tablet 650 mg  650 mg Oral Q6H PRN Elsa Sandra, APRN - CNP        Or    acetaminophen (TYLENOL) suppository 650 mg  650 mg Rectal Q6H PRN Elsa Sandra, APRN - CNP        polyethylene glycol (GLYCOLAX) packet 17 g  17 g Oral Daily PRN Elsa Sandra, APRN - CNP        promethazine (PHENERGAN) tablet 12.5 mg  12.5 mg Oral Q6H PRN Elsa Sandra, APRN - CNP        Or    ondansetron (ZOFRAN) injection 4 mg  4 mg Intravenous Q6H PRN Elsa Sandra, APRN - CNP        enoxaparin (LOVENOX) injection 30 mg  30 mg Subcutaneous Daily Elsa Sandra, APRN - CNP        0.9 % sodium chloride infusion   Intravenous Continuous Elsa Sandra, APRN - CNP 75 mL/hr at 11/30/20 1232      traMADol (ULTRAM) tablet 50 mg  50 mg Oral Q6H PRN Elsa Sandra, APRN - CNP        Or    traMADol Delfino Frater) tablet 100 mg  100 mg Oral Q6H PRN Elsa Sandra, APRN - CNP   100 mg at 11/30/20 0851    morphine (PF) injection 2 mg  2 mg Intravenous Q2H PRN Elsa Sandra, APRN - CNP        Or    morphine (PF) injection 4 mg  4 mg Intravenous Q2H PRN Elsa Sandra, APRN - CNP   4 mg at 11/30/20 5921       Allergies:  No Known Allergies    Problem List:    Patient Active Problem List   Diagnosis Code    HTN (hypertension) I10    Hyperlipidemia E78.5    Syncope and collapse R55    Closed right hip fracture ([facility] [address] S72.001A    JENISE (acute kidney injury) ([facility] [address] N17.9    Closed right hip fracture, initial encounter ([facility] [address] S72.001A       Past Medical History:        Diagnosis Date    Cancer [facility])     Bladder and skin per pt       Past Surgical History:  History reviewed. No pertinent surgical history.     Social History:    Social History     Tobacco Use    Smoking status: Never Smoker    Smokeless tobacco: Never Used   Substance Use Topics    Alcohol use: No                                Counseling given: Not Answered      Vital Signs (Current):   Vitals:    11/29/20 2037 11/30/20 0211 11/30/20 0745 11/30/20 1145   BP: (!) 117/53 (!) 171/65 (!) 156/65 (!) 163/70   Pulse: 66 103 76 78   Resp: 13 14 14 15   Temp: 98.3 °F (36.8 °C) 98.1 °F (36.7 °C) 98.2 °F (36.8 °C) 98.2 °F (36.8 °C)   TempSrc: Oral Oral Oral Oral   SpO2: 96% 98% 100% 98%   Weight:       Height:                                                  BP Readings from Last 3 Encounters:   11/30/20 (!) 163/70   03/30/18 (!) 192/92       NPO Status: Time of last liquid consumption: 0900                        Time of last solid consumption: 0900                        Date of last liquid consumption: 11/29/20                        Date of last solid food consumption: 11/29/20    BMI:   Wt Readings from Last 3 Encounters:   11/29/20 175 lb (79.4 kg)   02/21/20 180 lb (81.6 kg)   03/29/18 210 lb (95.3 kg)     Body mass index is 24.41 kg/m². CBC:   Lab Results   Component Value Date    WBC 10.4 11/30/2020    RBC 3.59 11/30/2020    HGB 11.8 11/30/2020    HCT 36.6 11/30/2020    .0 11/30/2020    RDW 13.7 11/30/2020     11/30/2020       CMP:   Lab Results   Component Value Date     11/30/2020    K 5.0 11/30/2020     11/30/2020    CO2 20 11/30/2020    BUN 33 11/30/2020    CREATININE 1.2 11/30/2020    GFRAA >60 11/30/2020    AGRATIO 1.5 11/29/2020    LABGLOM 57 11/30/2020    GLUCOSE 147 11/30/2020    PROT 6.8 11/29/2020    CALCIUM 8.8 11/30/2020    BILITOT 0.6 11/29/2020    ALKPHOS 96 11/29/2020    AST 23 11/29/2020    ALT 13 11/29/2020       POC Tests: No results for input(s): POCGLU, POCNA, POCK, POCCL, POCBUN, POCHEMO, POCHCT in the last 72 hours.     Coags:   Lab Results   Component Value Date    PROTIME 13.3 11/29/2020    INR 1.14 11/29/2020       HCG (If Applicable): No results found for: PREGTESTUR, PREGSERUM, HCG, HCGQUANT     ABGs: No results found for: PHART, PO2ART, ICP9HWX, ZZF4YYO, BEART, F8GQLXWH     Type & Screen (If Applicable):  No results found for: LABABO, LABRH    Drug/Infectious Status (If Applicable):  No results found for: HIV, HEPCAB    COVID-19 Screening (If Applicable): No results found for: COVID19      Anesthesia Evaluation  Patient summary reviewed and Nursing notes reviewed no history of anesthetic complications:   Airway: Mallampati: II  TM distance: >3 FB   Neck ROM: full  Mouth opening: > = 3 FB Dental: normal exam         Pulmonary:Negative Pulmonary ROS                              Cardiovascular:    (+) hypertension:, CABG/stent (stent):,                   Neuro/Psych:   Negative Neuro/Psych ROS              GI/Hepatic/Renal: Neg GI/Hepatic/Renal ROS       (-) GERD, liver disease and no renal disease       Endo/Other:    (+) malignancy/cancer (bladder). (-) diabetes mellitus               Abdominal:           Vascular: negative vascular ROS. Anesthesia Plan      general     ASA 3     (I discussed with the patient the risks and benefits of PIV, general anesthesia, IV Narcotics, PACU. All questions were answered the patient agrees with the plan)  Induction: intravenous. MIPS: Prophylactic antiemetics administered. Anesthetic plan and risks discussed with patient. Plan discussed with CRNA.                 Ernestina Hayes MD   11/30/2020

## 2020-11-30 NOTE — PROGRESS NOTES
Comprehensive Nutrition Assessment    Type and Reason for Visit:  Initial, Positive Nutrition Screen    Nutrition Recommendations/Plan:   1. Recommend general diet when diet is advanced  2. Add Ensure daily  3. Monitor po intakes, nutrition adequacy, weights, pertinent labs, BMs    Nutrition Assessment:  Positive screen for weight loss. Pt admitted with right hip pain d/t mechanical fall. Pt currently NPO for possible surgery today. Pt endorses that his appetite is normally good. Pt states that he had lost weight while he was undergoing chemo and radiation treatments a while ago. Pt states that he has been maintaining a weight around ~180 lb. Pt had no questions at this time. Will continue to monitor and add ONS to help optimize nutrition. Malnutrition Assessment:  Malnutrition Status:   At risk for malnutrition (Comment)      Estimated Daily Nutrient Needs:  Energy (kcal):  4086-2023; Weight Used for Energy Requirements:  Current     Protein (g):  ; Weight Used for Protein Requirements:  Current(1.2-1.4 g/kg)        Fluid (ml/day):  1 ml/kcal; Method Used for Fluid Requirements:  1 ml/kcal      Nutrition Related Findings:  Last BM 11/29      Wounds:  None(Abraision)       Current Nutrition Therapies:    Diet NPO, After Midnight    Anthropometric Measures:  · Height: 5' 11\" (180.3 cm)  · Current Body Weight: 175 lb (79.4 kg)    · Ideal Body Weight: 172 lbs  · BMI: 24.4  · BMI Categories: Normal Weight (BMI 22.0 to 24.9) age over 72       Nutrition Diagnosis:   · Increased nutrient needs related to increase demand for energy/nutrients as evidenced by (increased protein needs d/t fracture and potential surgery)    Nutrition Interventions:   Food and/or Nutrient Delivery:  Start Oral Diet, Start Oral Nutrition Supplement  Nutrition Education/Counseling:  No recommendation at this time   Coordination of Nutrition Care:  Continue to monitor while inpatient    Goals:  Pt will have po intakes 50% or greater this admission       Nutrition Monitoring and Evaluation:   Behavioral-Environmental Outcomes:  None Identified   Food/Nutrient Intake Outcomes:  Food and Nutrient Intake, Supplement Intake  Physical Signs/Symptoms Outcomes:  Weight     Discharge Planning:     Too soon to determine     Electronically signed by Jonny Laurent RD, LD on 11/30/20 at 11:48 AM EST    Contact: [phone]

## 2020-11-30 NOTE — PLAN OF CARE
Department of Orthopedic Surgery  Physician Assistant   POC        Reason for Consult:  Right hip fracture  Requesting Physician: Chely White MD  Date of Service: 11/30/2020 10:31 AM    CHIEF COMPLAINT:  As Above    History Obtained From:  patient, electronic medical record    HISTORY OF PRESENT ILLNESS:                The patient is a 80 y.o. male who presents with above chief complaint. Pt states he was on his tractor stacking wood when he lost his balance and fell to the ground. His wife found him and called EMS. He noted immediate right hip pain after his fall and inability to ambulate. He was transported to the ED and imaging revealed a right subtrochanteric femur fracture. He was admitted for further treatment. Pt denies any new issues today or further injury from his fall. He notes pain with movement of his right leg, otherwise is comfortable. He is extremely active, cuts wood frequently to heat his home. He does not use any assistive devices prior to his fall. He feels he has been a little more \"off balance\" lately. He denies N/T to right LE. Wife not at bedside, she does not drive. He does take plavix for CAD. Past Medical History:        Diagnosis Date    Cancer [facility])     Bladder and skin per pt     Past Surgical History:    History reviewed. No pertinent surgical history. Medications Prior to Admission:   Prior to Admission medications    Medication Sig Start Date End Date Taking?  Authorizing Provider   aspirin 325 MG EC tablet Take 325 mg by mouth daily    Historical Provider, MD   clopidogrel (PLAVIX) 75 MG tablet Take 75 mg by mouth daily    Historical Provider, MD   lisinopril (PRINIVIL;ZESTRIL) 2.5 MG tablet Take 2.5 mg by mouth daily    Historical Provider, MD   atenolol (TENORMIN) 25 MG tablet Take 25 mg by mouth daily    Historical Provider, MD   simvastatin (ZOCOR) 20 MG tablet Take 20 mg by mouth nightly    Historical Provider, MD       Allergies:  Patient has no known allergies. Social History:    Tobacco:  reports that he has never smoked. He has never used smokeless tobacco.   Alcohol:  reports no history of alcohol use. Illicit Drug: No  Family History:   History reviewed. No pertinent family history. REVIEW OF SYSTEMS:    CONSTITUTIONAL:  negative  MUSCULOSKELETAL:  positive for  pain  All other systems reviewed and negative    PHYSICAL EXAM:    awake, alert, cooperative, no apparent distress, and appears stated age  MUSCULOSKELETAL:  there is no redness, warmth, or swelling of the joints  full range of motion noted  motor strength is 5 out of 5 all extremities bilaterally  tone is normal  with exception of  RIGHT HIP:  redness absent  warmth absent  swelling present to proximal thigh  tenderness present to proximal/lateral thigh and hip  range of motion decreased d/t fracture. Able to move toes without issues. external rotation abnormal  NVI to right LE  No abrasions noted    DATA:    CBC:   Recent Labs     11/29/20  1641 11/30/20  0815   WBC 10.5 10.4   HGB 11.8* 11.8*    190     BMP:    Recent Labs     11/29/20  1641 11/30/20  0815    133*   K 4.3 5.0    102   CO2 26 20*   BUN 40* 33*   CREATININE 1.8* 1.2   GLUCOSE 130* 147*     INR:   Recent Labs     11/29/20  1641   INR 1.14       Radiology:   CT Head WO Contrast   Final Result   No acute intracranial abnormality. CT Cervical Spine WO Contrast   Final Result   No acute abnormality of the cervical spine. XR CHEST PORTABLE   Final Result   Left 4th rib fracture. XR HIP 2-3 VW W PELVIS RIGHT   Final Result   1. Acute comminuted intertrochanteric fracture of the right femur with   subtrochanteric extension, overriding, displacement, and angulation. 2. Bony demineralization.          XR FEMUR RIGHT (MIN 2 VIEWS)    (Results Pending)          IMPRESSION/RECOMMENDATIONS:    Assessment:   Right intertrochanteric/subtrochanteric hip fracture    Plan:  1)   Discussed with the patient about his fall and resulting right femur fracture. We discussed recommendation for surgical fixation utilizing IM nailing with Dr. Malvin Freedman around 1300 today. Pt in agreement with POC and wishes to have surgery as soon as possible. NPO, consent, abx ordered. 2)   Labs and imaging reviewed  3)   ED and IM notes reviewed, pt cleared for OR. Holding plavix today  4)   Bedrest at this time  5)   Discussed with Dr. Malvin Freedman, full consult to follow  6)   DCP updated, pt wishes to return home if possible. Highly functional PTA. Thank you for the opportunity to consult on this patient.     Jamie Harry PA-C

## 2020-12-01 LAB
ANION GAP SERPL CALCULATED.3IONS-SCNC: 10 MMOL/L (ref 3–16)
BUN BLDV-MCNC: 39 MG/DL (ref 7–20)
CALCIUM SERPL-MCNC: 8.1 MG/DL (ref 8.3–10.6)
CHLORIDE BLD-SCNC: 108 MMOL/L (ref 99–110)
CO2: 22 MMOL/L (ref 21–32)
CREAT SERPL-MCNC: 1.7 MG/DL (ref 0.8–1.3)
GFR AFRICAN AMERICAN: 46
GFR NON-AFRICAN AMERICAN: 38
GLUCOSE BLD-MCNC: 145 MG/DL (ref 70–99)
HCT VFR BLD CALC: 23.3 % (ref 40.5–52.5)
HEMOGLOBIN: 7.7 G/DL (ref 13.5–17.5)
MCH RBC QN AUTO: 32.3 PG (ref 26–34)
MCHC RBC AUTO-ENTMCNC: 32.9 G/DL (ref 31–36)
MCV RBC AUTO: 98.1 FL (ref 80–100)
PDW BLD-RTO: 12.8 % (ref 12.4–15.4)
PLATELET # BLD: 214 K/UL (ref 135–450)
PLATELET SLIDE REVIEW: ADEQUATE
PMV BLD AUTO: 8.5 FL (ref 5–10.5)
POTASSIUM SERPL-SCNC: 4.5 MMOL/L (ref 3.5–5.1)
RBC # BLD: 2.37 M/UL (ref 4.2–5.9)
SLIDE REVIEW: ABNORMAL
SODIUM BLD-SCNC: 140 MMOL/L (ref 136–145)
WBC # BLD: 13.1 K/UL (ref 4–11)

## 2020-12-01 PROCEDURE — 6370000000 HC RX 637 (ALT 250 FOR IP): Performed by: ORTHOPAEDIC SURGERY

## 2020-12-01 PROCEDURE — 97110 THERAPEUTIC EXERCISES: CPT

## 2020-12-01 PROCEDURE — 36415 COLL VENOUS BLD VENIPUNCTURE: CPT

## 2020-12-01 PROCEDURE — 85027 COMPLETE CBC AUTOMATED: CPT

## 2020-12-01 PROCEDURE — 6360000002 HC RX W HCPCS: Performed by: ORTHOPAEDIC SURGERY

## 2020-12-01 PROCEDURE — 1200000000 HC SEMI PRIVATE

## 2020-12-01 PROCEDURE — 97162 PT EVAL MOD COMPLEX 30 MIN: CPT

## 2020-12-01 PROCEDURE — 97530 THERAPEUTIC ACTIVITIES: CPT

## 2020-12-01 PROCEDURE — 97166 OT EVAL MOD COMPLEX 45 MIN: CPT

## 2020-12-01 PROCEDURE — APPNB30 APP NON BILLABLE TIME 0-30 MINS: Performed by: PHYSICIAN ASSISTANT

## 2020-12-01 PROCEDURE — 80048 BASIC METABOLIC PNL TOTAL CA: CPT

## 2020-12-01 RX ADMIN — SIMVASTATIN 20 MG: 10 TABLET, FILM COATED ORAL at 20:32

## 2020-12-01 RX ADMIN — ATENOLOL 25 MG: 25 TABLET ORAL at 08:11

## 2020-12-01 RX ADMIN — CLOPIDOGREL BISULFATE 75 MG: 75 TABLET ORAL at 12:09

## 2020-12-01 RX ADMIN — TRAMADOL HYDROCHLORIDE 50 MG: 50 TABLET, COATED ORAL at 20:31

## 2020-12-01 RX ADMIN — CYCLOBENZAPRINE 10 MG: 10 TABLET, FILM COATED ORAL at 17:03

## 2020-12-01 RX ADMIN — DOCUSATE SODIUM 50MG AND SENNOSIDES 8.6MG 1 TABLET: 8.6; 5 TABLET, FILM COATED ORAL at 08:11

## 2020-12-01 RX ADMIN — LISINOPRIL 2.5 MG: 2.5 TABLET ORAL at 08:11

## 2020-12-01 RX ADMIN — ASPIRIN 325 MG: 325 TABLET, COATED ORAL at 12:09

## 2020-12-01 RX ADMIN — OXYCODONE HYDROCHLORIDE 5 MG: 5 TABLET ORAL at 14:09

## 2020-12-01 RX ADMIN — CEFAZOLIN SODIUM 2 G: 10 INJECTION, POWDER, FOR SOLUTION INTRAVENOUS at 08:11

## 2020-12-01 RX ADMIN — OXYCODONE HYDROCHLORIDE 5 MG: 5 TABLET ORAL at 08:11

## 2020-12-01 RX ADMIN — DOCUSATE SODIUM 50MG AND SENNOSIDES 8.6MG 1 TABLET: 8.6; 5 TABLET, FILM COATED ORAL at 20:31

## 2020-12-01 ASSESSMENT — PAIN - FUNCTIONAL ASSESSMENT: PAIN_FUNCTIONAL_ASSESSMENT: ACTIVITIES ARE NOT PREVENTED

## 2020-12-01 ASSESSMENT — PAIN SCALES - WONG BAKER
WONGBAKER_NUMERICALRESPONSE: 8
WONGBAKER_NUMERICALRESPONSE: 8

## 2020-12-01 ASSESSMENT — PAIN DESCRIPTION - LOCATION
LOCATION: LEG;HIP
LOCATION: HIP
LOCATION: HIP

## 2020-12-01 ASSESSMENT — PAIN DESCRIPTION - FREQUENCY
FREQUENCY: INTERMITTENT
FREQUENCY: INTERMITTENT

## 2020-12-01 ASSESSMENT — PAIN DESCRIPTION - ORIENTATION
ORIENTATION: RIGHT

## 2020-12-01 ASSESSMENT — PAIN SCALES - GENERAL
PAINLEVEL_OUTOF10: 8
PAINLEVEL_OUTOF10: 8
PAINLEVEL_OUTOF10: 6

## 2020-12-01 ASSESSMENT — PAIN DESCRIPTION - PAIN TYPE
TYPE: SURGICAL PAIN

## 2020-12-01 ASSESSMENT — PAIN DESCRIPTION - DESCRIPTORS: DESCRIPTORS: SORE

## 2020-12-01 NOTE — PROGRESS NOTES
Physical Therapy    Facility/Department: [place] [address] - MED SURG/ORTHO  Initial Assessment    NAME: Yumiko Hrao  : 1929  MRN: [id number]    Date of Service: 2020    Discharge Recommendations:  Subacute/Skilled Nursing Facility   PT Equipment Recommendations  Equipment Needed: No(TBD at SNF)    Assessment   Body structures, Functions, Activity limitations: Decreased functional mobility ; Decreased strength;Decreased endurance;Decreased ROM; Decreased balance;Decreased posture; Increased pain  Assessment: Pt referred for PT evaluation during current hospital stay with dx of R femur fx following fall outside, s/p IM nailing by Dr. Sharyn Bee on 20. Pt now cleared to begin working with PT with orders for TTWB RLE. Pt currently functioning well below his baseline post-surgery, requiring modA x 2 for transfers with RW and portions of bed mobility. Pt with poor tolerance to upright activity this session 2* orthostatic hypotension. Recommend SNF at D/C given pt's advanced age and current deficits post-fx. Treatment Diagnosis: Decreased ROM/strength RLE and (I) with mobility s/p R femur fx with IM nailing on 20  Specific instructions for Next Treatment: Progress ther ex and mobility as tolerated  Prognosis: Good  Decision Making: Medium Complexity  PT Education: Goals; General Safety;Gait Training;PT Role;Plan of Care;Disease Specific Education; Functional Mobility Training;Equipment;Precautions;Transfer Training;Weight-bearing Education;Home Exercise Program  Patient Education: Disease-specific education: Pt educated on compensatory transfer strategies with RW and TTWB status for RLE s/p femur fx and surgical repair. Pt verbalizes some understanding but will require reinforcement.   Barriers to Learning: Pain, decreased insight into deficits  REQUIRES PT FOLLOW UP: Yes  Activity Tolerance: Patient Tolerated treatment well;Patient limited by pain;Treatment limited secondary to medical complications (free text)  Activity Tolerance: Pt's OOB activity tolerance limited by orthostatic hypotension. Pt with decreased responsiveness after bed>chair transfer (BP = 72/38). After returning pt back to supine, BP rebounded to 112/55. RN aware. Patient Diagnosis(es): The primary encounter diagnosis was Closed fracture of right hip, initial encounter ([facility] [address] Diagnoses of JENISE (acute kidney injury) ([facility] [address] Fall, initial encounter, Hematuria, unspecified type, and Closed fracture of one rib of left side, initial encounter were also pertinent to this visit. has a past medical history of Cancer ([facility] [address] has no past surgical history on file. Restrictions  Restrictions/Precautions  Restrictions/Precautions: Weight Bearing, General Precautions, Fall Risk  Required Braces or Orthoses?: No  Lower Extremity Weight Bearing Restrictions  Right Lower Extremity Weight Bearing: Toe Touch Weight Bearing  Position Activity Restriction  Other position/activity restrictions: \"Touch down\" weight bearing RLE, high fall risk, IV  Vision/Hearing  Vision: Impaired  Vision Exceptions: Wears glasses for reading  Hearing: Exceptions to [facility] Exceptions: Hard of hearing/hearing concerns     Subjective  General  Chart Reviewed: Yes  Patient assessed for rehabilitation services?: Yes  Family / Caregiver Present: No  Referring Practitioner: Dr. Mandie Estrada  Referral Date : 11/30/20  Diagnosis: Fall off tractor with R femur fx, s/p closed reduction with IM nailing on 11/30/20  Follows Commands: Within Functional Limits  General Comment  Comments: Pt resting in bed upon entry of therapy staff  Subjective  Subjective: Pt agreeable to work with PT/OT this morning. Pt states he's hesitant to get OOB 2* pain although feels session ultimately \"went better than I expected. \"  Pt still very hopeful to D/C immediately home from hospital.  Pain Screening  Patient Currently in Pain: Yes  Pain Assessment  Pain Assessment: Faces  Armenta-Baker Pain Rating: Hurts whole lot  Pain Type: Surgical pain  Pain Location: Hip  Pain Orientation: Right  Pain Descriptors: Sore  Pain Frequency: Intermittent(pain with movement, no pain at rest)  Non-Pharmaceutical Pain Intervention(s): Ambulation/Increased Activity;Repositioned;Cold applied; Emotional support  Intervention List: Patient able to continue with treatment    Orientation  Orientation  Overall Orientation Status: Within Functional Limits     Social/Functional History  Social/Functional History  Lives With: Spouse  Type of Home: House  Home Layout: One level  Home Access: Stairs to enter without rails  Entrance Stairs - Number of Steps: 1 DANIELLE  Bathroom Shower/Tub: Tub/Shower unit  Bathroom Toilet: Standard  Bathroom Equipment: Grab bars in [address]: Rolling walker, Standard walker, 4 wheeled walker, Wheelchair-manual(Does not regularly use walker)  ADL Assistance: [address]: Independent(Wife completes mostly, he assists)  Homemaking Responsibilities: Yes  Ambulation Assistance: Independent  Transfer Assistance: Independent  Active : Yes  Occupation: Retired  Type of occupation: [employer],   Leisure & Hobbies: Goes to [place] in the winter, golf, baseball    Objective  Observation/Palpation  Posture: Fair    RLE General AROM: WFL ankle, decreased ~25-50% in hip and ~25% into knee extension (limited by post-surgical pain & weakness)  LLE AROM : WFL  Strength RLE: Not formally strength tested due to recent surgery; appears at least 3/5 in ankle, less than 3/5 in hip & knee  Strength LLE: WFL     Bed mobility  Supine to Sit: Moderate assistance(moving toward R side, HOB elevated ~45 degrees)  Sit to Supine: 2 Person assistance(maxA x 2)  Scootin Person assistance(maxA x 2 to scoot up in bed)     Transfers  Sit to Stand: 2 Person Assistance(modA x 2 from EOB to RW and from chair to Catarina-Stedy)  Stand to sit: 2 Person Assistance(modA x 2)  Bed to Chair: 2 Person Assistance;Dependent/Total(modA x 2 from bed>chair using RW, modA x 2 with use of Catarina-Stedy for chair>bed (given increasing s/s orthostatic hypotension); mod-max cues needed for direction & technique during transfer with walker)  Comments: Pt able to maintain TTWB RLE initially during sit>stand, although this became very difficult for pt as transfer progressed. Pt bearing nearly full weight through RLE toward end of bed>chair transfer with walker despite max cueing from therapists. Ambulation  Ambulation?: No(pt too weak/unsteady to safely attempt today)  WB Status: TTWB RLE     Balance  Posture: Fair  Sitting - Static: Good;-  Sitting - Dynamic: Fair  Standing - Static: Fair;-  Standing - Dynamic: Poor;+  Comments: Pt required modA x 2 to maintain standing balance toward end of bed>chair transfer. Exercises  Quad Sets: x 10 BLE  Heelslides: x 10 RLE (CGA progressing to indep)  Gluteal Sets: x 10 bilat  Knee Short Arc Quad: x 10 RLE with [place] for TKE  Ankle Pumps: x 15 BLE     Plan   Times per week: Once daily 7x/week  Times per day: Daily  Specific instructions for Next Treatment: Progress ther ex and mobility as tolerated  Current Treatment Recommendations: Strengthening, Balance Training, Endurance Training, Functional Mobility Training, Transfer Training, Gait Training, ROM, Home Exercise Program, Safety Education & Training, Patient/Caregiver Education & Training, Equipment Evaluation, Education, & procurement, Positioning  Safety Devices:  All fall risk precautions in place, Call light within reach, Nurse notified, Gait belt, Patient at risk for falls, Bed alarm in place, Left in bed, Telesitter in use    AM-PAC Score  AM-PAC Inpatient Mobility Raw Score : 11 (12/01/20 1507)  AM-PAC Inpatient T-Scale Score : 33.86 (12/01/20 1507)  Mobility Inpatient CMS 0-100% Score: 72.57 (12/01/20 1507)  Mobility Inpatient CMS G-Code Modifier : CL (12/01/20 1507)    Goals  Short term goals  Time Frame for Short term

## 2020-12-01 NOTE — PROGRESS NOTES
Occupational Therapy   Occupational Therapy Initial Assessment/Treatment    Date: 2020   Patient Name: Jem Lee  MRN: [id number]     : 1929    Date of Service: 2020    Discharge Recommendations:  [address]  OT Equipment Recommendations  Equipment Needed: No(Defer to PT)    Assessment   Performance deficits / Impairments: Decreased functional mobility ; Decreased ADL status; Decreased strength;Decreased safe awareness;Decreased cognition;Decreased endurance;Decreased balance;Decreased high-level IADLs    Assessment: Pt. is a 80 y.o. male presented to [facility] for closed right hip fracture after a fall off his tractor,  closed reduction, intramedullary nail insertion. Pt. reports very active lifestyle and feeling \"embarassed\" and frustrated with needing so much assistance. Pt. currently functioning below baseline requiring maxAx2 for bed mobility, functional mobility, and modAx2 for transfers w/ RW/STEDY w/ difficulties maintaining TTWB of RLE. ADLs not tested this date. Pt. experienced drop in BP w/ functional transfer, was returned to bed. He is [place] and had difficulty following directions and attending to safety w/ increased fear and pain in RLE. Reccommend d/c to SNF for further functional improvement, con't w/ acute OT follow-up. Treatment Diagnosis: Hip fx  Prognosis: Good  Decision Making: Medium Complexity  OT Education: OT Role;Plan of Care;Precautions; ADL Adaptive Strategies;Transfer Training;Equipment  Patient Education: TTWB status, safety w/ RW  REQUIRES OT FOLLOW UP: Yes    Activity Tolerance  Activity Tolerance: Patient Tolerated treatment well;Patient limited by pain  Activity Tolerance: Vitals: BP seated in chair after transfer 72/38, BP after return to bed supine 112/55  Safety Devices  Safety Devices in place: Yes  Type of devices: Left in bed;Bed alarm in place;Call light within reach;Nurse notified;Gait belt           Patient Diagnosis(es): The primary encounter diagnosis was Closed fracture of right hip, initial encounter ([facility] [address] Diagnoses of JENISE (acute kidney injury) ([facility] [address] Fall, initial encounter, Hematuria, unspecified type, and Closed fracture of one rib of left side, initial encounter were also pertinent to this visit. has a past medical history of Cancer ([facility] [address] has no past surgical history on file. Treatment Diagnosis: Hip fx      Restrictions  Restrictions/Precautions  Restrictions/Precautions: Weight Bearing, General Precautions, Fall Risk  Required Braces or Orthoses?: No  Lower Extremity Weight Bearing Restrictions  Right Lower Extremity Weight Bearing: Toe Touch Weight Bearing  Position Activity Restriction  Other position/activity restrictions: \"Touch down\" weight bearing RLE, high fall risk, IV    Subjective   General  Chart Reviewed: Yes, Orders, Progress Notes, History and Physical, Operative Notes  Family / Caregiver Present: No  Referring Practitioner: Sarah Beth Garnett DO 11/30/2020  Diagnosis: Closed right hip fracture 11/29/2020    Subjective  Subjective: Pt. supine in bed upon arrival, agreeable to therapy evaluation, \"My hearing is really bad, you have to come closer\", \"This is so embarassing to be needing this much help\", \"If that's all you girls are going to do with me then you should just send me home because I can do all this at home with my wife\"    Patient Currently in Pain: Yes(W/ movement)  Pain Assessment  Pain Assessment: Faces  Pain Level: 6  Armenta-Baker Pain Rating: Hurts whole lot  Pain Type: Surgical pain  Pain Location: Hip  Pain Orientation: Right  Pain Frequency: Intermittent  Functional Pain Assessment: Activities are not prevented  Non-Pharmaceutical Pain Intervention(s): Cold applied; Ambulation/Increased Activity;Repositioned; Therapeutic presence;Distraction; Therapeutic touch    Social/Functional History  Social/Functional History  Lives With: Spouse  Type of Home: House  Home Layout: One level  Home Access: Stairs to enter without rails  Entrance Stairs - Number of Steps: 1 DANIELLE  Bathroom Shower/Tub: Tub/Shower unit  Bathroom Toilet: Standard  Bathroom Equipment: Grab bars in shower  Home Equipment: Rolling walker, Standard walker, 4 wheeled walker, Wheelchair-manual(Does not regularly use walker)  ADL Assistance: [address]: Independent(Wife completes mostly, he assists)  Homemaking Responsibilities: Yes  Ambulation Assistance: Independent  Transfer Assistance: Independent  Active : Yes  Occupation: Retired  Type of occupation: [employer],   Leisure & Hobbies: Goes to [place] in the winter, golf, baseball    Objective   Vision: Impaired  Vision Exceptions: Wears glasses for reading  Hearing: Ex[facility] to [facility]  Hearing Exceptions: Hard of hearing/hearing concerns      Orientation  Overall Orientation Status: Within Functional Limits(Correct month, not year)    Balance  Sitting Balance: Contact guard assistance  Standing Balance: Maximum assistance(MaxAx2 w/ RW)  Standing Balance  Time: ~5 mins  Activity: EOB standing, transfer to chair  Comment: Pt. experienced significant drop in BP, increased fear and frustration w/ WB status    Functional Mobility  Functional - Mobility Device: Rolling Walker  Activity: Other(Bed>chair)  Assist Level: Maximum assistance  Functional Mobility Comments: MaxAx2 w/ RW and vc's for sequencing and WB status, utilized STEDY to return pt. to bed    ADL  UE Dressing:  Moderate assistance(Donning gown)  LE Dressing: Dependent/Total(Donning socks)  Additional Comments: Unable to participate in further ADLs this date d/t drop in BP  Tone RUE  RUE Tone: Normotonic  Tone LUE  LUE Tone: Normotonic  Coordination  Movements Are Fluid And Coordinated: Yes    Bed mobility  Supine to Sit: Moderate assistance(HOB elevated)  Sit to Supine: 2 Person assistance(MaxAx2)  Scootin Person assistance(MaxAx2 to scoot up in bed)  Comment: MaxAx2 secondary to pain and fear    Transfers  Sit to stand: 2 Person assistance; Moderate assistance  Stand to sit: 2 Person assistance; Moderate assistance  Transfer Comments: ModAx2 for sit<>stand w/ RW and STEDY secondary to pain and increased fear    Cognition  Overall Cognitive Status: Exceptions  Following Commands: Follows one step commands with repetition; Follows multistep commands with increased time; Follows multistep commands with repitition  Attention Span: Difficulty dividing attention  Safety Judgement: Decreased awareness of need for safety;Decreased awareness of need for assistance  Problem Solving: Assistance required to correct errors made;Assistance required to implement solutions  Insights: Decreased awareness of deficits  Initiation: Requires cues for some  Sequencing: Requires cues for some  Cognition Comment: Demo'd increased fear w/ movement, feels \"embarassed\" to be needing so much assistance, [place] creates difficulty following directions    LUE AROM (degrees)  LUE AROM : WFL  RUE AROM (degrees)  RUE AROM : WFL  LUE Strength  Gross LUE Strength: WFL  RUE Strength  Gross RUE Strength: WFL     Plan   Plan  Times per week: 3-5x/wk in acute  Current Treatment Recommendations: Safety Education & Training, Self-Care / ADL, Balance Training, Strengthening, Functional Mobility Training, Endurance Training, Pain Management    AM-PAC Score  AM-[facility] Inpatient Daily Activity Raw Score: 15 (12/01/20 1425)  AM-PAC Inpatient ADL T-Scale Score : 34.69 (12/01/20 1425)  ADL Inpatient CMS 0-100% Score: 56.46 (12/01/20 1425)  ADL Inpatient CMS G-Code Modifier : CK (12/01/20 1425)    Goals  Short term goals  Time Frame for Short term goals:  One week unless otherwise specified - 12/08  Short term goal 1: Pt. will perform functional transfer w/ minAx2  Short term goal 2: Pt. will complete standing level grooming tasks w/ correct adherence to RLE WB status  Short term goal 3: Pt. will complete 3 BUE exercises x15 in preparation for ADL tasks by

## 2020-12-01 NOTE — PROGRESS NOTES
Department of Orthopedic Surgery  Physician Assistant   Progress Note    Subjective:       Systemic or Specific Complaints:No Complaints per patient this am.  Much more comfortable this am than pre op. Orthostatic with therapy this am.  Pain controlled. Agitated and confused immediately post op, AVASYS in room. MS appears cleared this am.     Objective:     Patient Vitals for the past 24 hrs:   BP Temp Temp src Pulse Resp SpO2   12/01/20 1208 (!) 115/57 98.1 °F (36.7 °C) Oral 75 16 96 %   12/01/20 0745 (!) 130/58 98 °F (36.7 °C) Oral 80 16 96 %   12/01/20 0237 (!) 121/58 97.6 °F (36.4 °C) Oral 70 -- 96 %   12/01/20 0002 116/62 -- -- 70 16 --   11/30/20 2030 116/72 -- -- 69 -- --   11/30/20 1930 (!) 85/35 97.8 °F (36.6 °C) Oral -- 16 --   11/30/20 1825 (!) 149/66 97.7 °F (36.5 °C) Oral 77 18 --   11/30/20 1800 (!) 141/69 -- -- 68 11 94 %   11/30/20 1759 -- -- -- 70 10 --   11/30/20 1758 -- 98.1 °F (36.7 °C) Temporal 72 11 93 %   11/30/20 1757 -- -- -- 77 17 --   11/30/20 1751 -- -- -- 69 12 --   11/30/20 1750 -- -- -- 71 14 --   11/30/20 1749 (!) 138/44 98.1 °F (36.7 °C) Temporal 70 10 93 %   11/30/20 1748 -- -- -- 70 10 --   11/30/20 1747 -- -- -- 72 10 --   11/30/20 1745 -- -- -- 69 10 92 %   11/30/20 1730 (!) 126/48 -- -- 70 9 92 %   11/30/20 1729 -- -- -- 75 12 --   11/30/20 1728 -- -- -- 73 14 --   11/30/20 1727 -- -- -- 72 11 --   11/30/20 1726 -- -- -- 74 13 --   11/30/20 1725 -- -- -- 72 12 92 %   11/30/20 1722 -- -- -- 70 12 --   11/30/20 1721 -- -- -- 78 19 --   11/30/20 1720 (!) 122/49 96.9 °F (36.1 °C) Temporal 73 12 95 %   11/30/20 1719 -- -- -- 72 12 --   11/30/20 1718 -- -- -- 73 29 --       General: alert, appears stated age, cooperative and no distress   Wound: Wound clean and dry no evidence of infection. Motion: Painful range of Motion in affected extremity   DVT Exam: No evidence of DVT seen on physical exam.     Additional exam:   NVI to right LE  Ice to thigh  Thigh soft but swollen. Moving toes and ankle without difficulty. Data Review  CBC:   Lab Results   Component Value Date    WBC 13.1 12/01/2020    RBC 2.37 12/01/2020    HGB 7.7 12/01/2020    HCT 23.3 12/01/2020     12/01/2020       Renal:   Lab Results   Component Value Date     12/01/2020    K 4.5 12/01/2020    K 5.0 11/30/2020     12/01/2020    CO2 22 12/01/2020    BUN 39 12/01/2020    CREATININE 1.7 12/01/2020    GLUCOSE 145 12/01/2020    CALCIUM 8.1 12/01/2020            Assessment:      right hip IM nailing, POD #1`. Plan:      1:  Continue current plan of care per IM. Labs reviewed, acute blood loss anemia noted post op. Continue to monitor. 2:  Continue Deep venous thrombosis prophylaxis- resumed ASA and Plavix  3:  Continue Pain Control PRN  4:  PT and OT, TTWB on right LE  5:  Ice to thigh  6:  D/c planning pending course, pt wishing to d/c home if possible. Lives with his wife. Son lives [place] [facility] [place].   DCP following    Baron Mc HENNESSY

## 2020-12-01 NOTE — PROGRESS NOTES
Pt is verbally and trying to be physically aggressive at this time. Pt went to surgery A&O x4 and now is quite confused and agitated. Pt is in significant pain and was refusing narcotics in pacu but I stated that his pain was so significant that I felt he needed them and he was agreeable. Gave morphine at this time and will give po next. Pt continues to try and climb out of bed but is in too much pain to even sit up. Avasys added to room. Will continue to assess and monitor.

## 2020-12-01 NOTE — PROGRESS NOTES
Pt refused 4 eye but pt now laying on side. mepilex peeled back and coccyx assessed. Red and blanchable. Mepilex to leg clean dry and intact at this time. Ice thrown to floor by pt.

## 2020-12-01 NOTE — PROGRESS NOTES
Hospitalist Progress Note      PCP: Julio César Hernández MD    Date of Admission: 11/29/2020    Chief Complaint:  Right hip pain     Hospital Course:       Subjective:  working with pt/ot, no overnight issues,worried about taking his DAPT(confirmed with his cardiology office)      Medications:  Reviewed    Infusion Medications    sodium chloride 100 mL/hr at 11/29/20 2025    sodium chloride 75 mL/hr at 11/30/20 1856     Scheduled Medications    sodium chloride flush  10 mL Intravenous 2 times per day    sennosides-docusate sodium  1 tablet Oral BID    aspirin  325 mg Oral Daily    atenolol  25 mg Oral Daily    clopidogrel  75 mg Oral Daily    lisinopril  2.5 mg Oral Daily    simvastatin  20 mg Oral Nightly    sodium chloride flush  10 mL Intravenous 2 times per day    enoxaparin  30 mg Subcutaneous Daily     PRN Meds: cyclobenzaprine, sodium chloride flush, HYDROmorphone, magnesium hydroxide, oxyCODONE, morphine, sodium chloride flush, acetaminophen **OR** acetaminophen, polyethylene glycol, promethazine **OR** ondansetron, traMADol **OR** traMADol, morphine **OR** morphine      Intake/Output Summary (Last 24 hours) at 12/1/2020 1111  Last data filed at 12/1/2020 0935  Gross per 24 hour   Intake 1280 ml   Output 525 ml   Net 755 ml       Physical Exam Performed:    BP (!) 130/58   Pulse 80   Temp 98 °F (36.7 °C) (Oral)   Resp 16   Ht 5' 11\" (1.803 m)   Wt 175 lb (79.4 kg)   SpO2 96%   BMI 24.41 kg/m²     General appearance: No apparent distress, appears stated age and cooperative. HEENT: Pupils equal, round, and reactive to light. Conjunctivae/corneas clear. Neck: Supple, with full range of motion. No jugular venous distention. Trachea midline. Respiratory:  Normal respiratory effort. Clear to auscultation, bilaterally without Rales/Wheezes/Rhonchi. Cardiovascular: Regular rate and rhythm with normal S1/S2 without murmurs, rubs or gallops.   Abdomen: Soft, non-tender, non-distended with normal bowel sounds. Musculoskeletal: No clubbing, cyanosis or edema bilaterally. Full range of motion without deformity except right hip dressing c/d/i  Skin: Skin color, texture, turgor normal.  No rashes or lesions. Neurologic:  Neurovascularly intact without any focal sensory/motor deficits. Cranial nerves: II-XII intact, grossly non-focal.  Psychiatric: Alert and oriented, thought content appropriate, normal insight  Capillary Refill: Brisk,< 3 seconds   Peripheral Pulses: +2 palpable, equal bilaterally       Labs:   Recent Labs     11/29/20  1641 11/30/20  0815   WBC 10.5 10.4   HGB 11.8* 11.8*   HCT 34.7* 36.6*    190     Recent Labs     11/29/20  1641 11/30/20  0815    133*   K 4.3 5.0    102   CO2 26 20*   BUN 40* 33*   CREATININE 1.8* 1.2   CALCIUM 8.8 8.8     Recent Labs     11/29/20  1641   AST 23   ALT 13   BILITOT 0.6   ALKPHOS 96     Recent Labs     11/29/20  1641   INR 1.14     No results for input(s): Froilan Lager in the last 72 hours. Urinalysis:      Lab Results   Component Value Date    NITRU Negative 11/29/2020    WBCUA 0-2 11/29/2020    BACTERIA Rare 11/29/2020    RBCUA 11-20 11/29/2020    BLOODU MODERATE 11/29/2020    SPECGRAV >=1.030 11/29/2020    GLUCOSEU Negative 11/29/2020       Radiology:  XR FEMUR RIGHT (MIN 2 VIEWS)   Final Result   Successful internal reduction of proximal left femur fracture. Please refer   to procedure notes for additional details. CT Head WO Contrast   Final Result   No acute intracranial abnormality. CT Cervical Spine WO Contrast   Final Result   No acute abnormality of the cervical spine. XR CHEST PORTABLE   Final Result   Left 4th rib fracture. XR HIP 2-3 VW W PELVIS RIGHT   Final Result   1. Acute comminuted intertrochanteric fracture of the right femur with   subtrochanteric extension, overriding, displacement, and angulation. 2. Bony demineralization.          FLUORO FOR SURGICAL PROCEDURES (Results Pending)           Assessment/Plan:    Active Hospital Problems    Diagnosis    Closed right hip fracture ([facility] [S72.001A]    JENISE (acute kidney injury) ([facility] [N17.9]    [facility] [S72.001A]    HTN (hypertension) [I10]    Hyperlipidemia [E78.5]         Right hip pain in setting of right hip fracture d/t mechanical fall  -X-ray right pelvis revealed acute comminuted intertrochanteric fracture of the right femur with subtrochanteric extension, overriding, displacement and angulation  -CT cervical spine revealed no acute abnormality of cervical spine  -CT head revealed no acute intracranial abnormality  -Chest x-ray revealed a left fourth rib fracture  -morphine given in ED  -orthopedic surgery consulted,s/p surgery 11/30  -PRN pain medication  -Based on the evaluation on 11/29/2020, and diagnostic testing including EKG, there are no apparent cardiac contraindications to the proposed procedure.  All questions/concerns that could be addressed were addressed. Encourage use of incentive spirometry although patient is at low risk for perioperative cardiopulmonary complication. Fany Pepper estimated risk probability for perioperative MI or cardiac arrest is 0.47%.   The patient appears to be an appropriate candidate for surgery if ortho deemed necessary.     Postop Anemia- due to surgery  -monitored h/h  -transfuse if < 7    JENISE, CR 1.8 on admission  -likely 2/2 poor po intake  -1 L NS given in ED  -monitored with IVF  -bmp monitored   -held acei    Essential HTN  -continued atenolol and lisinopril, held for low bp  -continued asa and plavix     HLD  -continued simvastatin     Chronic normocytic anemia, 11.8/34.7 on admission  -no s/s of bleeding at this time  -cbc in am     DVT Prophylaxis: on DAPT  Diet: DIET GENERAL;  Code Status: Full Code    PT/OT Eval Status: rec snf     Dispo - improved anemia/jenise, 2-3 days pending clinical improvement    Rosendo Mccoy MD

## 2020-12-02 LAB
ANION GAP SERPL CALCULATED.3IONS-SCNC: 7 MMOL/L (ref 3–16)
BUN BLDV-MCNC: 42 MG/DL (ref 7–20)
CALCIUM SERPL-MCNC: 8.2 MG/DL (ref 8.3–10.6)
CHLORIDE BLD-SCNC: 109 MMOL/L (ref 99–110)
CO2: 25 MMOL/L (ref 21–32)
CREAT SERPL-MCNC: 1.5 MG/DL (ref 0.8–1.3)
GFR AFRICAN AMERICAN: 53
GFR NON-AFRICAN AMERICAN: 44
GLUCOSE BLD-MCNC: 114 MG/DL (ref 70–99)
HCT VFR BLD CALC: 21.7 % (ref 40.5–52.5)
HEMOGLOBIN: 7.3 G/DL (ref 13.5–17.5)
MCH RBC QN AUTO: 32.5 PG (ref 26–34)
MCHC RBC AUTO-ENTMCNC: 33.4 G/DL (ref 31–36)
MCV RBC AUTO: 97.5 FL (ref 80–100)
PDW BLD-RTO: 12.8 % (ref 12.4–15.4)
PLATELET # BLD: 195 K/UL (ref 135–450)
PMV BLD AUTO: 8.4 FL (ref 5–10.5)
POTASSIUM SERPL-SCNC: 4.1 MMOL/L (ref 3.5–5.1)
RBC # BLD: 2.23 M/UL (ref 4.2–5.9)
SODIUM BLD-SCNC: 141 MMOL/L (ref 136–145)
WBC # BLD: 11.7 K/UL (ref 4–11)

## 2020-12-02 PROCEDURE — 1200000000 HC SEMI PRIVATE

## 2020-12-02 PROCEDURE — 6370000000 HC RX 637 (ALT 250 FOR IP): Performed by: ORTHOPAEDIC SURGERY

## 2020-12-02 PROCEDURE — 97535 SELF CARE MNGMENT TRAINING: CPT

## 2020-12-02 PROCEDURE — 2580000003 HC RX 258: Performed by: INTERNAL MEDICINE

## 2020-12-02 PROCEDURE — 2580000003 HC RX 258: Performed by: ORTHOPAEDIC SURGERY

## 2020-12-02 PROCEDURE — APPNB30 APP NON BILLABLE TIME 0-30 MINS: Performed by: PHYSICIAN ASSISTANT

## 2020-12-02 PROCEDURE — 97530 THERAPEUTIC ACTIVITIES: CPT

## 2020-12-02 PROCEDURE — U0003 INFECTIOUS AGENT DETECTION BY NUCLEIC ACID (DNA OR RNA); SEVERE ACUTE RESPIRATORY SYNDROME CORONAVIRUS 2 (SARS-COV-2) (CORONAVIRUS DISEASE [COVID-19]), AMPLIFIED PROBE TECHNIQUE, MAKING USE OF HIGH THROUGHPUT TECHNOLOGIES AS DESCRIBED BY CMS-2020-01-R: HCPCS

## 2020-12-02 PROCEDURE — 85027 COMPLETE CBC AUTOMATED: CPT

## 2020-12-02 PROCEDURE — 80048 BASIC METABOLIC PNL TOTAL CA: CPT

## 2020-12-02 PROCEDURE — 97110 THERAPEUTIC EXERCISES: CPT

## 2020-12-02 PROCEDURE — 36415 COLL VENOUS BLD VENIPUNCTURE: CPT

## 2020-12-02 RX ORDER — 0.9 % SODIUM CHLORIDE 0.9 %
500 INTRAVENOUS SOLUTION INTRAVENOUS ONCE
Status: COMPLETED | OUTPATIENT
Start: 2020-12-02 | End: 2020-12-02

## 2020-12-02 RX ADMIN — CLOPIDOGREL BISULFATE 75 MG: 75 TABLET ORAL at 10:36

## 2020-12-02 RX ADMIN — DOCUSATE SODIUM 50MG AND SENNOSIDES 8.6MG 1 TABLET: 8.6; 5 TABLET, FILM COATED ORAL at 21:11

## 2020-12-02 RX ADMIN — SIMVASTATIN 20 MG: 10 TABLET, FILM COATED ORAL at 21:11

## 2020-12-02 RX ADMIN — OXYCODONE HYDROCHLORIDE 5 MG: 5 TABLET ORAL at 10:40

## 2020-12-02 RX ADMIN — SODIUM CHLORIDE 500 ML: 9 INJECTION, SOLUTION INTRAVENOUS at 14:36

## 2020-12-02 RX ADMIN — DOCUSATE SODIUM 50MG AND SENNOSIDES 8.6MG 1 TABLET: 8.6; 5 TABLET, FILM COATED ORAL at 10:36

## 2020-12-02 RX ADMIN — SODIUM CHLORIDE, PRESERVATIVE FREE 10 ML: 5 INJECTION INTRAVENOUS at 10:37

## 2020-12-02 RX ADMIN — SODIUM CHLORIDE, PRESERVATIVE FREE 10 ML: 5 INJECTION INTRAVENOUS at 21:12

## 2020-12-02 RX ADMIN — ASPIRIN 325 MG: 325 TABLET, COATED ORAL at 10:36

## 2020-12-02 ASSESSMENT — PAIN DESCRIPTION - DESCRIPTORS
DESCRIPTORS: SORE
DESCRIPTORS: SORE

## 2020-12-02 ASSESSMENT — PAIN SCALES - GENERAL
PAINLEVEL_OUTOF10: 4
PAINLEVEL_OUTOF10: 0
PAINLEVEL_OUTOF10: 8

## 2020-12-02 ASSESSMENT — PAIN DESCRIPTION - ORIENTATION
ORIENTATION: RIGHT
ORIENTATION: RIGHT

## 2020-12-02 ASSESSMENT — PAIN DESCRIPTION - PAIN TYPE
TYPE: SURGICAL PAIN;ACUTE PAIN
TYPE: SURGICAL PAIN

## 2020-12-02 ASSESSMENT — PAIN DESCRIPTION - LOCATION
LOCATION: LEG;HIP
LOCATION: HIP

## 2020-12-02 ASSESSMENT — PAIN DESCRIPTION - FREQUENCY: FREQUENCY: INTERMITTENT

## 2020-12-02 NOTE — PROGRESS NOTES
Physical Therapy  Facility/Department: [facility] C5 - MED SURG/ORTHO  Daily Treatment Note  NAME: Asif Wells  : 1929  MRN: [id number]    Date of Service: 2020    Discharge Recommendations:  Subacute/Skilled Nursing Facility   PT Equipment Recommendations  Equipment Needed: No(TBD at SNF)    Assessment   Body structures, Functions, Activity limitations: Decreased functional mobility ; Decreased strength;Decreased endurance;Decreased ROM; Decreased balance;Decreased posture; Increased pain  Assessment: Pt participated well with PT today, although still demonstrates s/s orthostatic hypotension with OOB acitvity. BP's ultimately stabilized with pt's sx's subsiding while up in chair, which allowed pt to sit upright in chair for sustained time post-PT session. Pt continues to participate well in supine LE ther ex but struggles with maintaining TTWB RLE status during transfers. Continue to recommend SNF. Treatment Diagnosis: Decreased ROM/strength RLE and (I) with mobility s/p R femur fx with IM nailing on 20  Specific instructions for Next Treatment: Progress ther ex and mobility as tolerated  Prognosis: Good  Decision Making: Medium Complexity  PT Education: Goals; General Safety;Gait Training;PT Role;Plan of Care;Disease Specific Education; Functional Mobility Training;Equipment;Precautions;Transfer Training;Weight-bearing Education;Home Exercise Program  Patient Education: Disease-specific education: Pt educated on compensatory transfer strategies with RW and TTWB status for RLE s/p femur fx and surgical repair. Pt verbalizes some understanding but will require reinforcement. Barriers to Learning: Pain, decreased insight into deficits  REQUIRES PT FOLLOW UP: Yes  Activity Tolerance: Patient Tolerated treatment well;Patient limited by pain; Patient limited by endurance  Activity Tolerance: BP values during session:    132/58 supine   78/40 immediately after bed>chair transfer   80/50 after 2 minutes in chair   82/45 after 4 minutes in chair   76/45 after 6 minutes in chair   93/53 after 8 minutes in chair   94/55 after 10 minutes in chair   85/52 after 12 minutes in chair   93/53 after 15 minutes in chair   Pt sx's of mild dizziness, evident after bed>chair transfer, subsided over 15-minute period up in chair. Given pt's decreasing orthostatic sx's and stable BP's in the 90s/50s, therapist left pt sitting up after PT session with C5 RN Tiburcio Neri) aware. Patient Diagnosis(es): The primary encounter diagnosis was Closed fracture of right hip, initial encounter ([facility] Diagnoses of JENISE (acute kidney injury) ([facility] Fall, initial encounter, Hematuria, unspecified type, and Closed fracture of one rib of left side, initial encounter were also pertinent to this visit. has a past medical history of Cancer ([facility] has no past surgical history on file. Restrictions  Restrictions/Precautions  Restrictions/Precautions: Weight Bearing, General Precautions, Fall Risk  Required Braces or Orthoses?: No  Lower Extremity Weight Bearing Restrictions  Right Lower Extremity Weight Bearing: Toe Touch Weight Bearing  Position Activity Restriction  Other position/activity restrictions: \"Touch down\" weight bearing RLE, high fall risk, IV  Subjective   General  Chart Reviewed: Yes  Response To Previous Treatment: Patient with no complaints from previous session. Family / Caregiver Present: No  Referring Practitioner: Dr. Diya Deleon  Subjective  Subjective: Pt agreeable to work with PT/OT this morning. Pt states his pain is feeling a little better controlled today vs. yesterday. Now agreeable to D/C to SNF.   General Comment  Comments: Pt resting in bed upon entry of therapy staff  Pain Screening  Patient Currently in Pain: Yes  Pain Assessment  Pain Assessment: 0-10  Pain Level: 8  Pain Type: Surgical pain  Pain Location: Hip  Pain Orientation: Right  Pain Descriptors: Sore  Pain Frequency: Intermittent(pain with movement, denies pain at rest)  Non-Pharmaceutical Pain Intervention(s): Ambulation/Increased Activity;Repositioned;Cold applied; Emotional support       Orientation  Orientation  Overall Orientation Status: Within Functional Limits    Objective   Bed mobility  Supine to Sit: Moderate assistance(moving toward R side, HOB elevated ~45 degrees)  Scooting: Moderate assistance(to scoot forward to EOB)     Transfers  Sit to Stand: 2 Person Assistance(modA x 2 from EOB to RW)  Stand to sit: 2 Person Assistance(modA x 2)  Bed to Chair: 2 Person Assistance(modA x 2 via stand-step transfer toward R side, using RW for support, max verbal/visual cues needed to attempt to maintain TTWB RLE and for proper sequencing/technique)  Comment: Pt able to maintain TTWB RLE initially during sit>stand, although this became very difficult for pt as transfer progressed. Pt bearing nearly full weight through RLE toward end of bed>chair transfer with walker despite max cueing from therapists. Ambulation  Ambulation?: No(unsafe to attempt given inability to maintain TTWB RLE during transfers)  WB Status: TTWB RLE     Balance  Posture: Fair  Sitting - Static: Good;-  Sitting - Dynamic: Fair  Standing - Static: Fair;-  Standing - Dynamic: Poor;+  Comments: Pt required modA x 2 to maintain standing balance toward end of bed>chair transfer.      Exercises  Quad Sets: x 15 BLE  Heelslides: x 12 RLE with min/CGA  Gluteal Sets: x 15 bilat  Knee Short Arc Quad: x 12 RLE with Suyapa for TKE  Ankle Pumps: x 15 BLE     AM-PAC Score  AM-PAC Inpatient Mobility Raw Score : 11 (12/02/20 1523)  AM-PAC Inpatient T-Scale Score : 33.86 (12/02/20 1523)  Mobility Inpatient CMS 0-100% Score: 72.57 (12/02/20 1523)  Mobility Inpatient CMS G-Code Modifier : CL (12/02/20 1523)    Goals  Short term goals  Time Frame for Short term goals: 5 days, 12/06/20 (unless otherwise specified)  Short term goal 1: Pt will transfer supine <-> sit with Suyapa x 1 for RLE  Short term goal 2: Pt will transfer sit <-> stand and bed>chair using RW with min/CGA x 1 while maintaining TTWB RLE  Short term goal 3: By 12/04/20: Pt will tolerate 12-15 reps RLE ther ex for ROM/strengthening - MET 12/02/20, goal ongoing  Short term goal 4: Pt will ambulate x 15 feet using RW with Suyapa x 1 while maintaining TTWB RLE  Patient Goals   Patient goals : \"To be able to go home as soon as I'm able. \"    Plan    Times per week: Once daily 7x/week  Times per day: Daily  Specific instructions for Next Treatment: Progress ther ex and mobility as tolerated  Current Treatment Recommendations: Strengthening, Balance Training, Endurance Training, Functional Mobility Training, Transfer Training, Gait Training, ROM, Home Exercise Program, Safety Education & Training, Patient/Caregiver Education & Training, Equipment Evaluation, Education, & procurement, Positioning  Safety Devices: All fall risk precautions in place, Call light within reach, Nurse notified, Gait belt, Patient at risk for falls, Telesitter in use, Left in chair, Chair alarm in place     Therapy Time   Individual Concurrent Group Co-treatment   Time In 1037         Time Out 1115         Minutes 38         Timed Code Treatment Minutes: [address] [facility] [place], [place] #[id number]    If pt is unable to be seen after this session, please let this note serve as discharge summary. Please see case management note for discharge disposition. Thank you.

## 2020-12-02 NOTE — PROGRESS NOTES
Hospitalist Progress Note      PCP: Renetta Collado MD    Date of Admission: 11/29/2020    Chief Complaint:  Right hip pain     Hospital Course:       Subjective:  pain controlled, had dizziness with pt/ot and low bp      Medications:  Reviewed    Infusion Medications    sodium chloride 100 mL/hr at 11/29/20 2025    sodium chloride 75 mL/hr at 11/30/20 1856     Scheduled Medications    sodium chloride flush  10 mL Intravenous 2 times per day    sennosides-docusate sodium  1 tablet Oral BID    aspirin  325 mg Oral Daily    [Held by provider] atenolol  25 mg Oral Daily    clopidogrel  75 mg Oral Daily    [Held by provider] lisinopril  2.5 mg Oral Daily    simvastatin  20 mg Oral Nightly     PRN Meds: cyclobenzaprine, sodium chloride flush, HYDROmorphone, magnesium hydroxide, oxyCODONE, morphine, sodium chloride flush, acetaminophen **OR** acetaminophen, polyethylene glycol, promethazine **OR** ondansetron, traMADol **OR** traMADol, morphine **OR** morphine      Intake/Output Summary (Last 24 hours) at 12/2/2020 1359  Last data filed at 12/2/2020 1041  Gross per 24 hour   Intake --   Output 440 ml   Net -440 ml       Physical Exam Performed:    BP (!) 110/54   Pulse 91   Temp 98.1 °F (36.7 °C) (Oral)   Resp 18   Ht 5' 11\" (1.803 m)   Wt 175 lb (79.4 kg)   SpO2 96%   BMI 24.41 kg/m²   General appearance: No apparent distress, appears stated age and cooperative. HEENT: Pupils equal, round, and reactive to light. Conjunctivae/corneas clear. Neck: Supple, with full range of motion. No jugular venous distention. Trachea midline. Respiratory:  Normal respiratory effort. Clear to auscultation, bilaterally without Rales/Wheezes/Rhonchi. Cardiovascular: Regular rate and rhythm with normal S1/S2 without murmurs, rubs or gallops. Abdomen: Soft, non-tender, non-distended with normal bowel sounds.   Musculoskeletal: No clubbing, cyanosis or edema bilaterally.  Full range of motion without deformity

## 2020-12-02 NOTE — DISCHARGE INSTR - COC
Continuity of Care Form    Patient Name: Lindsay Alexander   :  1929  MRN:  [id number]    Admit date:  2020  Discharge date:  ***    Code Status Order: Full Code   Advance Directives:   Advance Care Flowsheet Documentation       Date/Time Healthcare Directive Type of Healthcare Directive Copy in [address] Agent's Name Healthcare Agent's Phone Number    20 [phone]  No, patient does not have an advance directive for healthcare treatment -- -- -- -- --    20 [phone]  Other (Comment) cannot recall -- -- -- -- --            Admitting Physician:  Nataliya Prasad MD  PCP: Mira Vera MD    Discharging Nurse: [facility] Unit/Room#: 9995/3585-15  Discharging Unit Phone Number: ***    Emergency Contact:   Extended Emergency Contact Information  Primary Emergency Contact: Kimmie Corbin  Address:  [address] Phone: [phone]  Mobile Phone: [phone]  Relation: Spouse  Secondary Emergency Contact: [address] [facility] [place]  Mobile Phone: [phone]  Relation: Child   needed? No    Past Surgical History:  History reviewed. No pertinent surgical history. Immunization History: There is no immunization history on file for this patient.     Active Problems:  Patient Active Problem List   Diagnosis Code    HTN (hypertension) I10    Hyperlipidemia E78.5    Syncope and collapse R55    Closed right hip fracture ([facility] [address] S72.001A    JENISE (acute kidney injury) ([facility] N17.9    Closed right hip fracture, initial encounter ([facility] S72.001A       Isolation/Infection:   Isolation            No Isolation          Patient Infection Status       None to display            Nurse Assessment:  Last Vital Signs: BP (!) 110/54   Pulse 91   Temp 98.1 °F (36.7 °C) (Oral)   Resp 18   Ht 5' 11\" (1.803 m)   Wt 175 lb (79.4 kg)   SpO2 96%   BMI 24.41 kg/m²     Last documented pain score (0-10 scale): [address]       / signature: Electronically signed by Kaia Blanchard RN on 12/10/20 at 8:59 AM EST    PHYSICIAN SECTION    Prognosis: Fair    Condition at Discharge: Stable    Rehab Potential (if transferring to Rehab): Fair    Recommended Labs or Other Treatments After Discharge: Follow-up with primary cardiologist (Dr. Sherry Phillip) within the next 2 to 3 weeks regarding orthostatic hypotension  - Continue midodrine, ASA, Plavix, and statin   - Can increase atenolol to 50mg po QD if needed for supine HTN as this is also helpful with orthostatic hypotension  - Would continue to hold lisinopril  -continue 20-30mmHg BLE compression stockings (currently has thigh high POLLO hose while inpatient)  -continue Adequate water intake and eat small frequent meals        Physician Certification: I certify the above information and transfer of Yariel Corbin  is necessary for the continuing treatment of the diagnosis listed and that he requires [facility] for less 30 days.      Update Admission H&P: No change in H&P    PHYSICIAN SIGNATURE:  Electronically signed by Bill Shrestha MD on 12/11/2020 at 3:00 PM

## 2020-12-02 NOTE — PROGRESS NOTES
At 0400 patient requested to go to the [facility]. Patient's bp in semifowler's position was 159/63. When patient was dangled on side of bed his bp dropped to 85/36, but patient denied dizziness. For patient's safety he was assisted  Back to laying position. BP stabilized at 131/54.

## 2020-12-02 NOTE — PROGRESS NOTES
Occupational Therapy  Facility/Department: [place] [address] - MED SURG/ORTHO  Daily Treatment Note  NAME: Deidra Zaidi  : 1929  MRN: [id number]    Date of Service: 2020    Discharge Recommendations:  Subacute/Skilled Nursing Facility       Assessment   Performance deficits / Impairments: Decreased functional mobility ; Decreased ADL status; Decreased strength;Decreased safe awareness;Decreased cognition;Decreased endurance;Decreased balance;Decreased high-level IADLs  Assessment: Pt. is a 80 y.o. male presented to [facility] for closed right hip fracture after a fall off his tractor,  closed reduction, intramedullary nail insertion. Patient unable to maintain TTWB on RLE during static standing with SW and bed to chair transfers. Pt Bp did drop once up to chair, though remained stable. Reccommend d/c to SNF for further functional improvement, con't w/ acute OT follow-up. REQUIRES OT FOLLOW UP: Yes  Activity Tolerance  Activity Tolerance: Patient Tolerated treatment well  Activity Tolerance: Vitals: Bp /58; Bp up in chair 78/40 up to  After 15mins up in chair 95/53. Pt reports no dizziness or nausea only pain. Agreeable to remain up in chair. Patient Diagnosis(es): The primary encounter diagnosis was Closed fracture of right hip, initial encounter ([facility] [address] Diagnoses of JENISE (acute kidney injury) ([address] Fall, initial encounter, Hematuria, unspecified type, and Closed fracture of one rib of left side, initial encounter were also pertinent to this visit. has a past medical history of Cancer ([address] has no past surgical history on file. Restrictions  Restrictions/Precautions  Restrictions/Precautions: Weight Bearing, General Precautions, Fall Risk  Required Braces or Orthoses?: No  Lower Extremity Weight Bearing Restrictions  Right Lower Extremity Weight Bearing: Toe Touch Weight Bearing  Position Activity Restriction  Other position/activity restrictions:  \"Touch down\" weight bearing RLE, high fall risk, IV  Subjective   General  Chart Reviewed: Yes, Orders, Progress Notes, History and Physical, Operative Notes  Patient assessed for rehabilitation services?: Yes  Family / Caregiver Present: No  Referring Practitioner: Fernandez Stanley DO 11/30/2020  Diagnosis: Closed right hip fracture 11/29/2020  Subjective  Subjective: Pt. supine in bed upon arrival, agreeable to therapy evaluation, \"My hearing is really bad, you have to come closer\", \"This is so embarassing to be needing this much help\", \"If that's all you girls are going to do with me then you should just send me home because I can do all this at home with my wife\"  Pain Assessment  Pain Level: 8  Pain Type: Surgical pain;Acute pain  Pain Location: Leg;Hip  Pain Orientation: Right  Pain Descriptors: Sore  Non-Pharmaceutical Pain Intervention(s): Cold applied; Ambulation/Increased Activity;Repositioned  Response to Pain Intervention: Patient Satisfied  Vital Signs  Patient Currently in Pain: Yes   Orientation   WFL  Objective        Bed mobility  Supine to Sit: Moderate assistance  Sit to Supine: Unable to assess(up to chair at end of session)     Transfers  Stand Step Transfers: 2 Person assistance; Moderate assistance(not able to maintain TTWB on RLE during transfer with SW)  Sit to stand: 2 Person assistance; Moderate assistance  Stand to sit: 2 Person assistance; Moderate assistance      Type of ROM/Therapeutic Exercise  Type of ROM/Therapeutic Exercise: AROM  Exercises  Scapular Protraction: x 10 reps  Scapular Retraction: x 10 reps  Shoulder Elevation: x 10 reps  Shoulder Flexion: x 10 reps  Elbow Flexion: x 10 reps  Elbow Extension: x 10 reps  Finger Flexion: x 10 reps  Finger Extension: x 10 reps      Plan   Plan  Times per week: 3-5x/wk in acute  Current Treatment Recommendations: Safety Education & Training, Self-Care / ADL, Balance Training, Strengthening, Functional Mobility Training, Endurance Training, Pain Management    AM-PAC Score AM-PAC Inpatient Daily Activity Raw Score: 15 (12/02/20 1402)  AM-PAC Inpatient ADL T-Scale Score : 34.69 (12/02/20 1402)  ADL Inpatient CMS 0-100% Score: 56.46 (12/02/20 1402)  ADL Inpatient CMS G-Code Modifier : CK (12/02/20 1402)    Goals  Short term goals  Time Frame for Short term goals: One week unless otherwise specified - 12/08  Short term goal 1: Pt. will perform functional transfer w/ minAx2  Short term goal 2: Pt. will complete standing level grooming tasks w/ correct adherence to RLE WB status  Short term goal 3: Pt. will complete 3 BUE exercises x15 in preparation for ADL tasks by 12/05  Patient Goals   Patient goals : \"I want to get to the chair with only a little bit of help\"       Therapy Time   Individual Concurrent Group Co-treatment   Time In       1037   Time Out       1115   Minutes       38   Timed Code Treatment Minutes: 206 [facility], OTR/L  If pt is unable to be seen after this session, please let this note serve as discharge summary. Please see case management note for discharge disposition. Thank you.

## 2020-12-02 NOTE — PROGRESS NOTES
Department of Orthopedic Surgery  Physician Assistant   Progress Note    Subjective:       Systemic or Specific Complaints:No Complaints per patient this am. Sitting up in the chair after therapy this morning. Thigh sore. Objective:     Patient Vitals for the past 24 hrs:   BP Temp Temp src Pulse Resp SpO2   12/02/20 0430 (!) 131/54 97.8 °F (36.6 °C) Oral 72 18 97 %   12/02/20 0405 (!) 85/36 -- -- -- -- --   12/01/20 2236 121/62 98.2 °F (36.8 °C) Oral 68 16 98 %   12/01/20 2031 -- -- -- -- -- 99 %   12/01/20 2015 133/67 97.4 °F (36.3 °C) Oral 78 18 --   12/01/20 2013 (!) 97/46 -- -- -- -- --   12/01/20 1408 115/61 98.3 °F (36.8 °C) Oral 80 16 --   12/01/20 1208 (!) 115/57 98.1 °F (36.7 °C) Oral 75 16 96 %       General: alert, appears stated age, cooperative and no distress   Wound: Wound clean and dry no evidence of infection. Motion: Painful range of Motion in affected extremity   DVT Exam: No evidence of DVT seen on physical exam.     Additional exam:   NVI to right LE  Ice to thigh  Thigh soft but swollen. Moving toes and ankle without difficulty. Data Review  CBC:   Lab Results   Component Value Date    WBC 11.7 12/02/2020    RBC 2.23 12/02/2020    HGB 7.3 12/02/2020    HCT 21.7 12/02/2020     12/02/2020       Renal:   Lab Results   Component Value Date     12/02/2020    K 4.1 12/02/2020    K 5.0 11/30/2020     12/02/2020    CO2 25 12/02/2020    BUN 42 12/02/2020    CREATININE 1.5 12/02/2020    GLUCOSE 114 12/02/2020    CALCIUM 8.2 12/02/2020            Assessment:      right hip IM nailing, POD #2`. Plan:      1:  Continue current plan of care per IM. Labs reviewed, acute blood loss anemia noted post op. Continue to monitor. 2:  Continue Deep venous thrombosis prophylaxis- resumed ASA and Plavix  3:  Continue Pain Control PRN  4:  PT and OT, TTWB on right LE  5:  Ice to thigh  6:  D/c planning pending course, therapy recommending SNF and pt agreeable.   [facility] has been

## 2020-12-02 NOTE — CARE COORDINATION
CM met with pt at bedside to discuss therapy recs. Pt is in agreement with SNF and would like to talk wife and son about a facility. This writer will reach out to family also today.  CM following-Azalea Meyer RN

## 2020-12-03 LAB
ANION GAP SERPL CALCULATED.3IONS-SCNC: 7 MMOL/L (ref 3–16)
BUN BLDV-MCNC: 32 MG/DL (ref 7–20)
CALCIUM SERPL-MCNC: 8.1 MG/DL (ref 8.3–10.6)
CHLORIDE BLD-SCNC: 106 MMOL/L (ref 99–110)
CO2: 24 MMOL/L (ref 21–32)
CREAT SERPL-MCNC: 1.1 MG/DL (ref 0.8–1.3)
GFR AFRICAN AMERICAN: >60
GFR NON-AFRICAN AMERICAN: >60
GLUCOSE BLD-MCNC: 110 MG/DL (ref 70–99)
HCT VFR BLD CALC: 21.6 % (ref 40.5–52.5)
HEMOGLOBIN: 7.2 G/DL (ref 13.5–17.5)
MCH RBC QN AUTO: 32.5 PG (ref 26–34)
MCHC RBC AUTO-ENTMCNC: 33.6 G/DL (ref 31–36)
MCV RBC AUTO: 96.6 FL (ref 80–100)
PDW BLD-RTO: 13.1 % (ref 12.4–15.4)
PLATELET # BLD: 229 K/UL (ref 135–450)
PMV BLD AUTO: 8.5 FL (ref 5–10.5)
POTASSIUM SERPL-SCNC: 3.9 MMOL/L (ref 3.5–5.1)
RBC # BLD: 2.23 M/UL (ref 4.2–5.9)
SARS-COV-2, PCR: NOT DETECTED
SODIUM BLD-SCNC: 137 MMOL/L (ref 136–145)
WBC # BLD: 8.9 K/UL (ref 4–11)

## 2020-12-03 PROCEDURE — APPNB30 APP NON BILLABLE TIME 0-30 MINS: Performed by: PHYSICIAN ASSISTANT

## 2020-12-03 PROCEDURE — 6370000000 HC RX 637 (ALT 250 FOR IP): Performed by: ORTHOPAEDIC SURGERY

## 2020-12-03 PROCEDURE — 80048 BASIC METABOLIC PNL TOTAL CA: CPT

## 2020-12-03 PROCEDURE — 2580000003 HC RX 258: Performed by: INTERNAL MEDICINE

## 2020-12-03 PROCEDURE — 97535 SELF CARE MNGMENT TRAINING: CPT

## 2020-12-03 PROCEDURE — 97110 THERAPEUTIC EXERCISES: CPT

## 2020-12-03 PROCEDURE — 36415 COLL VENOUS BLD VENIPUNCTURE: CPT

## 2020-12-03 PROCEDURE — 2580000003 HC RX 258: Performed by: ORTHOPAEDIC SURGERY

## 2020-12-03 PROCEDURE — 97530 THERAPEUTIC ACTIVITIES: CPT

## 2020-12-03 PROCEDURE — 1200000000 HC SEMI PRIVATE

## 2020-12-03 PROCEDURE — 85027 COMPLETE CBC AUTOMATED: CPT

## 2020-12-03 RX ORDER — TRAMADOL HYDROCHLORIDE 50 MG/1
50 TABLET ORAL EVERY 6 HOURS PRN
Qty: 10 TABLET | Refills: 0 | Status: SHIPPED | OUTPATIENT
Start: 2020-12-03 | End: 2020-12-06

## 2020-12-03 RX ORDER — SODIUM CHLORIDE, SODIUM LACTATE, POTASSIUM CHLORIDE, AND CALCIUM CHLORIDE .6; .31; .03; .02 G/100ML; G/100ML; G/100ML; G/100ML
500 INJECTION, SOLUTION INTRAVENOUS ONCE
Status: COMPLETED | OUTPATIENT
Start: 2020-12-03 | End: 2020-12-03

## 2020-12-03 RX ORDER — CYCLOBENZAPRINE HCL 10 MG
10 TABLET ORAL 3 TIMES DAILY PRN
Qty: 10 TABLET | Refills: 0 | Status: SHIPPED | OUTPATIENT
Start: 2020-12-03 | End: 2020-12-13

## 2020-12-03 RX ADMIN — DOCUSATE SODIUM 50MG AND SENNOSIDES 8.6MG 1 TABLET: 8.6; 5 TABLET, FILM COATED ORAL at 09:13

## 2020-12-03 RX ADMIN — ASPIRIN 325 MG: 325 TABLET, COATED ORAL at 09:14

## 2020-12-03 RX ADMIN — SIMVASTATIN 20 MG: 10 TABLET, FILM COATED ORAL at 21:19

## 2020-12-03 RX ADMIN — SODIUM CHLORIDE, PRESERVATIVE FREE 10 ML: 5 INJECTION INTRAVENOUS at 09:14

## 2020-12-03 RX ADMIN — DOCUSATE SODIUM 50MG AND SENNOSIDES 8.6MG 1 TABLET: 8.6; 5 TABLET, FILM COATED ORAL at 21:18

## 2020-12-03 RX ADMIN — CLOPIDOGREL BISULFATE 75 MG: 75 TABLET ORAL at 09:14

## 2020-12-03 RX ADMIN — SODIUM CHLORIDE, POTASSIUM CHLORIDE, SODIUM LACTATE AND CALCIUM CHLORIDE 500 ML: 600; 310; 30; 20 INJECTION, SOLUTION INTRAVENOUS at 15:40

## 2020-12-03 RX ADMIN — SODIUM CHLORIDE, PRESERVATIVE FREE 10 ML: 5 INJECTION INTRAVENOUS at 21:19

## 2020-12-03 ASSESSMENT — PAIN DESCRIPTION - PAIN TYPE
TYPE: SURGICAL PAIN
TYPE: SURGICAL PAIN

## 2020-12-03 ASSESSMENT — PAIN SCALES - GENERAL: PAINLEVEL_OUTOF10: 0

## 2020-12-03 ASSESSMENT — PAIN DESCRIPTION - ORIENTATION
ORIENTATION: RIGHT
ORIENTATION: RIGHT

## 2020-12-03 ASSESSMENT — PAIN DESCRIPTION - DESCRIPTORS
DESCRIPTORS: SORE
DESCRIPTORS: SORE

## 2020-12-03 ASSESSMENT — PAIN DESCRIPTION - FREQUENCY
FREQUENCY: INTERMITTENT
FREQUENCY: INTERMITTENT

## 2020-12-03 ASSESSMENT — PAIN SCALES - WONG BAKER
WONGBAKER_NUMERICALRESPONSE: 2
WONGBAKER_NUMERICALRESPONSE: 2

## 2020-12-03 ASSESSMENT — PAIN - FUNCTIONAL ASSESSMENT: PAIN_FUNCTIONAL_ASSESSMENT: ACTIVITIES ARE NOT PREVENTED

## 2020-12-03 ASSESSMENT — PAIN DESCRIPTION - LOCATION
LOCATION: HIP
LOCATION: HIP

## 2020-12-03 NOTE — PROGRESS NOTES
Occupational Therapy  Facility/Department: [facility]5 - MED SURG/ORTHO  Daily Treatment Note    NAME: Barbie Carpio  : 1929  MRN: [id number]    Date of Service: 12/3/2020    Discharge Recommendations:  Subacute/Skilled Nursing Facility  OT Equipment Recommendations  Equipment Needed: No(Defer to PT)    Assessment   Performance deficits / Impairments: Decreased functional mobility ; Decreased ADL status; Decreased strength;Decreased safe awareness;Decreased cognition;Decreased endurance;Decreased balance;Decreased high-level IADLs    Assessment: Pt. is a 80 y.o. male presented to [facility] for closed right hip fracture after a fall off his tractor,  closed reduction, intramedullary nail insertion. Patient unable to maintain TTWB on RLE during static standing with RW and bed to chair transfers. Pt BP did drop once up to chair, though stabilized in seated. Pt. SUP for seated grooming tasks. Reccommend d/c to SNF for further functional improvement, con't w/ acute OT follow-up. Treatment Diagnosis: Hip fx  Prognosis: Good  OT Education: OT Role;Plan of Care;Precautions; ADL Adaptive Strategies;Transfer Training;Equipment  Patient Education: TTWB status, safety w/ RW  REQUIRES OT FOLLOW UP: Yes    Activity Tolerance  Activity Tolerance: Patient Tolerated treatment well;Treatment limited secondary to medical complications (free text)  Activity Tolerance: Vitals: BP EOB 93/48, BP up in chair 73/68, after rest 74/63, then 94/59, after seated grooming tasks 95/53, pt. reports no dizziness/nausea, agreeable to remain in chair, compression hose applied  Safety Devices  Safety Devices in place: Yes  Type of devices: Left in bed;Bed alarm in place;Call light within reach;Nurse notified;Gait belt         Patient Diagnosis(es): The primary encounter diagnosis was Closed fracture of right hip, initial encounter ([facility] [address]  Diagnoses of JENISE (acute kidney injury) ([facility] [address] Fall, initial encounter, Hematuria, unspecified type, and Closed fracture of one rib of left side, initial encounter were also pertinent to this visit. has a past medical history of Cancer ([facility] [address] has a past surgical history that includes Femur fracture surgery (Right, 11/30/2020). Restrictions  Restrictions/Precautions  Restrictions/Precautions: Weight Bearing, General Precautions, Fall Risk  Required Braces or Orthoses?: No  Lower Extremity Weight Bearing Restrictions  Right Lower Extremity Weight Bearing: Toe Touch Weight Bearing  Position Activity Restriction  Other position/activity restrictions: \"Touch down\" weight bearing RLE, high fall risk, compression stockings, Zelda-sys monitor in room    Subjective   General  Chart Reviewed: Yes  Patient assessed for rehabilitation services?: Yes  Family / Caregiver Present: No  Referring Practitioner: Malena Zhao DO 11/30/2020  Diagnosis: Closed right hip fracture 11/29/2020    Subjective  Subjective: Pt. supine in bed upon arrival, talking on the phone, \"You ladies will have to wait a minute\", \"I thought they wanted me to use crutches\", does not report when he feels dizzy    Pain Assessment  Pain Assessment: Faces  Armenta-Baker Pain Rating: Hurts a little bit  Pain Type: Surgical pain  Pain Location: Hip  Pain Orientation: Right  Pain Descriptors: Sore  Pain Frequency: Intermittent  Functional Pain Assessment: Activities are not prevented  Non-Pharmaceutical Pain Intervention(s): Ambulation/Increased Activity;Repositioned;Cold applied;Distraction; Therapeutic presence; Therapeutic touch  Response to Pain Intervention: Patient Satisfied    Orientation  Orientation  Overall Orientation Status: Within Functional Limits    Objective    ADL  Feeding: Independent(Eating breakfast)  Grooming: Supervision(Brushing teeth seated)  UE Bathing: Supervision(Washing face w/ washcloth)  LE Dressing: Dependent/Total(Donning/doffing socks)    Balance  Sitting Balance: Supervision  Standing Balance:  Moderate assistance(ModAx2 w/ RW)  Standing Balance  Time: ~3 mins  Activity: EOB standing, transfer to chair  Comment: Pt. experienced significant drop in BP, increased fear and frustration w/ WB status    Functional Mobility  Functional - Mobility Device: Rolling Walker  Activity: Other(Bed>chair)  Assist Level: Moderate assistance(ModAx2 w/ RW)  Functional Mobility Comments: Pt. becomes orthostatic upon standing and ability to maintain TTWB decreases, requires increased, simplified verbal cues for safety and sequencing    Bed mobility  Supine to Sit: Stand by assistance  Sit to Supine: Unable to assess(Pt. in chair at EOS)  Scooting: Stand by assistance    Transfers  Stand Step Transfers: 2 Person assistance; Moderate assistance(Unable to maintain TTWB on RLE w/ RW)  Sit to stand: 2 Person assistance; Moderate assistance  Stand to sit: 2 Person assistance; Moderate assistance  Transfer Comments: ModAx2 for sit<>stand w/ RW, pt. becomes orthostatic upon standing and ability to maintain TTWB decreases, requires VCs for safety and sequencing    Cognition  Overall Cognitive Status: Exceptions  Following Commands: Follows one step commands with repetition; Follows multistep commands with increased time; Follows multistep commands with repitition  Attention Span: Difficulty dividing attention; Difficulty attending to directions  Safety Judgement: Decreased awareness of need for safety;Decreased awareness of need for assistance  Problem Solving: Assistance required to correct errors made;Assistance required to implement solutions;Decreased awareness of errors  Insights: Decreased awareness of deficits  Initiation: Requires cues for some  Sequencing: Requires cues for some  Cognition Comment: Unable to recall precautions, does not understand benefits of acute care after repeated education    LUE AROM (degrees)  LUE AROM : WFL  RUE AROM (degrees)  RUE AROM : WFL     Plan   Plan  Times per week: 3-5x/wk in acute  Current Treatment Recommendations: Safety Education & Training, Self-Care / ADL, Balance Training, Strengthening, Functional Mobility Training, Endurance Training, Pain Management    AM-PAC Score  AM-PAC Inpatient Daily Activity Raw Score: 16 (12/03/20 1140)  AM-PAC Inpatient ADL T-Scale Score : 35.96 (12/03/20 1140)  ADL Inpatient CMS 0-100% Score: 53.32 (12/03/20 1140)  ADL Inpatient CMS G-Code Modifier : CK (12/03/20 1140)    Goals  Short term goals  Time Frame for Short term goals: One week unless otherwise specified - 12/08  Short term goal 1: Pt. will perform functional transfer w/ minAx2 - ongoing, modAx2 12/03  Short term goal 2: Pt. will complete standing level grooming tasks w/ correct adherence to RLE WB status - ongoing  Short term goal 3: Pt. will complete 3 BUE exercises x15 in preparation for ADL tasks by 12/05 - ongoing  Patient Goals   Patient goals : \"I want to get to the chair with only a little bit of help\"       Therapy Time   Individual Concurrent Group Co-treatment   Time In       0842   Time Out       0920   Minutes       38   Timed Code Treatment Minutes: Carolynn Augustine. Judd Juarez, S/OT  Cosigned by   Maria T Sol OTR/L    If pt is unable to be seen after this session, please let this note serve as discharge summary. Please see case management note for discharge disposition. Thank you.

## 2020-12-03 NOTE — PROGRESS NOTES
Hospitalist Progress Note      PCP: Chay Lima MD    Date of Admission: 11/29/2020    Chief Complaint:  Right hip pain     Hospital Course:       Subjective:  pain controlled, had dizziness with pt/ot and low bp again         Medications:  Reviewed    Infusion Medications   Scheduled Medications    sodium chloride flush  10 mL Intravenous 2 times per day    sennosides-docusate sodium  1 tablet Oral BID    aspirin  325 mg Oral Daily    [Held by provider] atenolol  25 mg Oral Daily    clopidogrel  75 mg Oral Daily    [Held by provider] lisinopril  2.5 mg Oral Daily    simvastatin  20 mg Oral Nightly     PRN Meds: cyclobenzaprine, sodium chloride flush, magnesium hydroxide, sodium chloride flush, acetaminophen **OR** acetaminophen, polyethylene glycol, promethazine **OR** ondansetron, traMADol **OR** traMADol      Intake/Output Summary (Last 24 hours) at 12/3/2020 1411  Last data filed at 12/3/2020 0916  Gross per 24 hour   Intake 700 ml   Output 1600 ml   Net -900 ml       Physical Exam Performed:    BP (!) 157/65   Pulse 86   Temp 97.9 °F (36.6 °C) (Oral)   Resp 16   Ht 5' 11\" (1.803 m)   Wt 175 lb (79.4 kg)   SpO2 96%   BMI 24.41 kg/m²       General appearance: No apparent distress, appears stated age and cooperative. HEENT: Pupils equal, round, and reactive to light. Conjunctivae/corneas clear. Neck: Supple, with full range of motion. No jugular venous distention. Trachea midline. Respiratory:  Normal respiratory effort. Clear to auscultation, bilaterally without Rales/Wheezes/Rhonchi. Cardiovascular: Regular rate and rhythm with normal S1/S2 without murmurs, rubs or gallops. Abdomen: Soft, non-tender, non-distended with normal bowel sounds. Musculoskeletal: No clubbing, cyanosis or edema bilaterally.  Full range of motion without deformity except right hip dressing c/d/i  Skin: Skin color, texture, turgor normal.  No rashes or lesions.   Neurologic:  Neurovascularly intact without any focal sensory/motor deficits. Cranial nerves: II-XII intact, grossly non-focal.  Psychiatric: Alert and oriented, thought content appropriate, normal insight  Capillary Refill: Brisk,< 3 seconds   Peripheral Pulses: +2 palpable, equal bilaterally          Labs:   Recent Labs     12/01/20  1119 12/02/20  0652 12/03/20  0747   WBC 13.1* 11.7* 8.9   HGB 7.7* 7.3* 7.2*   HCT 23.3* 21.7* 21.6*    195 229     Recent Labs     12/01/20  1119 12/02/20  0652 12/03/20  0747    141 137   K 4.5 4.1 3.9    109 106   CO2 22 25 24   BUN 39* 42* 32*   CREATININE 1.7* 1.5* 1.1   CALCIUM 8.1* 8.2* 8.1*     No results for input(s): AST, ALT, BILIDIR, BILITOT, ALKPHOS in the last 72 hours. No results for input(s): INR in the last 72 hours. No results for input(s): Towana Ball in the last 72 hours. Urinalysis:      Lab Results   Component Value Date    NITRU Negative 11/29/2020    WBCUA 0-2 11/29/2020    BACTERIA Rare 11/29/2020    RBCUA 11-20 11/29/2020    BLOODU MODERATE 11/29/2020    SPECGRAV >=1.030 11/29/2020    GLUCOSEU Negative 11/29/2020       Radiology:  XR FEMUR RIGHT (MIN 2 VIEWS)   Final Result   Successful internal reduction of proximal left femur fracture. Please refer   to procedure notes for additional details. CT Head WO Contrast   Final Result   No acute intracranial abnormality. CT Cervical Spine WO Contrast   Final Result   No acute abnormality of the cervical spine. XR CHEST PORTABLE   Final Result   Left 4th rib fracture. XR HIP 2-3 VW W PELVIS RIGHT   Final Result   1. Acute comminuted intertrochanteric fracture of the right femur with   subtrochanteric extension, overriding, displacement, and angulation. 2. Bony demineralization.          FLUORO FOR SURGICAL PROCEDURES    (Results Pending)           Assessment/Plan:    Active Hospital Problems    Diagnosis    Closed right hip fracture ([facility] [address] [S72.001A]    JENISE (acute kidney injury) ([facility] [address] [N17.9]    Closed right hip fracture, initial encounter ([facility] [address] [S72.001A]    HTN (hypertension) [I10]    Hyperlipidemia [E78.5]         Right hip pain in setting of right hip fracture d/t mechanical fall  -X-ray right pelvis revealed acute comminuted intertrochanteric fracture of the right femur with subtrochanteric extension, overriding, displacement and angulation  -CT cervical spine revealed no acute abnormality of cervical spine  -CT head revealed no acute intracranial abnormality  -Chest x-ray revealed a left fourth rib fracture  -morphine given in ED  -orthopedic surgery consulted,s/p surgery 11/30  -PRN pain medication  -Based on the evaluation on 11/29/2020, and diagnostic testing including EKG, there are no apparent cardiac contraindications to the proposed procedure.  All questions/concerns that could be addressed were addressed. Encourage use of incentive spirometry although patient is at low risk for perioperative cardiopulmonary complication. Cayetano Araujo estimated risk probability for perioperative MI or cardiac arrest is 0.47%.   The patient appears to be an appropriate candidate for surgery if ortho deemed necessary.     Postop Anemia- due to surgery  -monitored h/h  -transfuse if < 7     JENISE, CR 1.8 on admission, resolved as of 12/3  -likely 2/2 poor po intake  -1 L NS given in ED  -monitored with IVF  -bmp monitored   -held acei     Essential HTN  -continued atenolol and lisinopril, held for low bp  -continued asa and plavix     HLD  -continued simvastatin     Chronic normocytic anemia, 11.8/34.7 on admission  -no s/s of bleeding at this time  -cbc in am     DVT Prophylaxis: on DAPT  Diet: DIET GENERAL;  Code Status: Full Code    PT/OT Eval Status: rec snf    Dispo - pending improved bp, neg Romayne Bowers, MD

## 2020-12-03 NOTE — PROGRESS NOTES
Physical Therapy  Facility/Department: [facility] C5 - MED SURG/ORTHO  Daily Treatment Note  NAME: Effie Mcallister  : 1929  MRN: [id number]    Date of Service: 12/3/2020    Discharge Recommendations:  Subacute/Skilled Nursing Facility   PT Equipment Recommendations  Equipment Needed: No(TBD at SNF)    Assessment   Body structures, Functions, Activity limitations: Decreased functional mobility ; Decreased strength;Decreased endurance;Decreased ROM; Decreased balance;Decreased posture; Increased pain  Assessment: Pt participated well with PT today, although still demonstrates s/s orthostatic hypotension with OOB acitvity. BP's ultimately stabilized after ~5-minute rest break in chair, which allowed pt to sit upright in chair for sustained time post-PT session. Pt continues to participate well in supine LE ther ex but struggles with maintaining TTWB RLE status during transfers. Continue to recommend SNF. Treatment Diagnosis: Decreased ROM/strength RLE and (I) with mobility s/p R femur fx with IM nailing on 20  Specific instructions for Next Treatment: Progress ther ex and mobility as tolerated  Prognosis: Good  Decision Making: Medium Complexity  PT Education: Goals; General Safety;Gait Training;PT Role;Plan of Care;Disease Specific Education; Functional Mobility Training;Equipment;Precautions;Transfer Training;Weight-bearing Education;Home Exercise Program  Patient Education: Disease-specific education: Pt educated on compensatory transfer strategies with RW and TTWB status for RLE s/p femur fx and surgical repair. Pt verbalizes some understanding but will require reinforcement. Barriers to Learning: Pain, decreased insight into deficits  REQUIRES PT FOLLOW UP: Yes  Activity Tolerance: Patient Tolerated treatment well;Patient limited by pain; Patient limited by endurance  Activity Tolerance: BP values during session:    93/48 EOB   73/68 immediately after bed>chair transfer   74/63 after 2 minutes in chair   94/59 after 5 minutes in chair   95/53 after 15 minutes in chair  Pt with sx's orthostatic hypotension toward end of bed>chair transfer with near-syncope. Sx's lessened with therapist tilting pt back in chair and instructing him in active ankle pumps during rest break. Spoke with Paulo Regalado ortho PA and received verbal okay to don B thigh-high POLLO hose to aid in BP maintenance (given that pt's BP has dropped significantly with bed>chair transfers over the last 3 PT sessions). Patient Diagnosis(es): The primary encounter diagnosis was Closed fracture of right hip, initial encounter ([facility] [address] Diagnoses of JENISE (acute kidney injury) ([facility] [address] Fall, initial encounter, Hematuria, unspecified type, and Closed fracture of one rib of left side, initial encounter were also pertinent to this visit. has a past medical history of Cancer ([facility] [address] has a past surgical history that includes Femur fracture surgery (Right, 11/30/2020). Restrictions  Restrictions/Precautions  Restrictions/Precautions: Weight Bearing, General Precautions, Fall Risk  Required Braces or Orthoses?: No  Lower Extremity Weight Bearing Restrictions  Right Lower Extremity Weight Bearing: Toe Touch Weight Bearing  Position Activity Restriction  Other position/activity restrictions: \"Touch down\" weight bearing RLE, high fall risk, compression stockings, Zelda-sys monitor in room  Subjective   General  Chart Reviewed: Yes  Response To Previous Treatment: Patient with no complaints from previous session. Family / Caregiver Present: No  Referring Practitioner: Dr. Jennie Nogueira  Subjective  Subjective: Pt agreeable to work with PT/OT this morning. Pt states he's planning on D/C'ing to rehab. Generally cooperative although occasionally irritable 2* pain.   General Comment  Comments: Pt resting in bed upon entry of therapy staff  Pain Screening  Patient Currently in Pain: Yes  Pain Assessment  Pain Assessment: Faces  Armenta-Baker Pain Rating: Hurts a little bit(at rest)  Pain Type: Surgical pain  Pain Location: Hip  Pain Orientation: Right  Pain Descriptors: Sore  Pain Frequency: Intermittent  Non-Pharmaceutical Pain Intervention(s): Ambulation/Increased Activity;Repositioned;Cold applied; Emotional support    Orientation  Orientation  Overall Orientation Status: Within Functional Limits    Objective   Bed mobility  Supine to Sit: Stand by assistance(moving toward R side, HOB elevated ~45 degrees, increased time needed to complete 2* pain)  Scooting: Stand by assistance(to scoot forward to EOB)     Transfers  Sit to Stand: 2 Person Assistance(modA x 2 from EOB to RW)  Stand to sit: 2 Person Assistance(modA x 2)  Bed to Chair: 2 Person Assistance(modA x 2 moving toward R side, using RW for support, mod-max cues needed for proper technique & sequencing with walker)  Comment: Pt able to maintain TTWB RLE initially during sit>stand and during first 1-2 steps forward with walker, although this became very difficult for pt as transfer progressed. Pt bearing nearly full weight through RLE toward end of bed>chair transfer with walker despite max cueing from therapists. Ambulation  Ambulation?: No(unable to safely attempt given difficulty maintaining TTWB RLE with transfers)  WB Status: TTWB RLE     Balance  Posture: Fair  Sitting - Static: Good;-  Sitting - Dynamic: Fair  Standing - Static: Fair;-  Standing - Dynamic: Poor;+  Comments: Pt required modA x 2 to maintain standing balance toward end of bed>chair transfer. Exercises  Quad Sets: x 15 BLE  Heelslides: x 15 RLE with min/CGA  Gluteal Sets: x 15 bilat  Knee Short Arc Quad: x 15 RLE (I)  Ankle Pumps: x 15 BLE     Other Activities: Other (see comment)  Comment: Therapists assisted pt in donning B thigh-high POLLO hose while seated in chair.     AM-PAC Score  AM-PAC Inpatient Mobility Raw Score : 11 (12/03/20 1137)  AM-PAC Inpatient T-Scale Score : 33.86 (12/03/20 1137)  Mobility Inpatient CMS 0-100% Score: 72.57 (12/03/20 1137)  Mobility Inpatient CMS G-Code Modifier : CL (12/03/20 1137)    Goals  Short term goals  Time Frame for Short term goals: 5 days, 12/06/20 (unless otherwise specified)  Short term goal 1: Pt will transfer supine <-> sit with Suyapa x 1 for RLE - MET 12/03/20  Short term goal 2: Pt will transfer sit <-> stand and bed>chair using RW with min/CGA x 1 while maintaining TTWB RLE  Short term goal 3: By 12/04/20: Pt will tolerate 12-15 reps RLE ther ex for ROM/strengthening - MET 12/02/20, goal ongoing  Short term goal 4: Pt will ambulate x 15 feet using RW with Suyapa x 1 while maintaining TTWB RLE  Patient Goals   Patient goals : \"To be able to go home as soon as I'm able. \"    Plan    Times per week: Once daily 7x/week  Times per day: Daily  Specific instructions for Next Treatment: Progress ther ex and mobility as tolerated  Current Treatment Recommendations: Strengthening, Balance Training, Endurance Training, Functional Mobility Training, Transfer Training, Gait Training, ROM, Home Exercise Program, Safety Education & Training, Patient/Caregiver Education & Training, Equipment Evaluation, Education, & procurement, Positioning  Safety Devices: All fall risk precautions in place, Call light within reach, Nurse notified, Gait belt, Patient at risk for falls, Telesitter in use, Left in chair, Chair alarm in place     Therapy Time   Individual Concurrent Group Co-treatment   Time In 0842         Time Out 0920         Minutes 38         Timed Code Treatment Minutes: [address] [facility], [address] [place], [place] [id number]    If pt is unable to be seen after this session, please let this note serve as discharge summary. Please see case management note for discharge disposition. Thank you.

## 2020-12-03 NOTE — PROGRESS NOTES
Department of Orthopedic Surgery  Physician Assistant   Progress Note    Subjective:       Systemic or Specific Complaints:  Sleeping soundly in bed this am.  Was able to participate with therapy, BP soft. Objective:     Patient Vitals for the past 24 hrs:   BP Temp Temp src Pulse Resp SpO2   12/03/20 0812 (!) 157/65 97.9 °F (36.6 °C) Oral 86 16 96 %   12/02/20 2243 131/62 97.8 °F (36.6 °C) Oral 75 16 97 %   12/02/20 2104 132/63 98 °F (36.7 °C) Oral 80 20 97 %       General: Sleeping in bed   Wound: Wound clean and dry no evidence of infection. Motion: Not tested   DVT Exam: No evidence of DVT seen on physical exam.     Additional exam:   NVI to right LE  Ice to thigh  Thigh soft but swollen. Moving toes and ankle without difficulty. Markos hutchison in place    Data Review  CBC:   Lab Results   Component Value Date    WBC 8.9 12/03/2020    RBC 2.23 12/03/2020    HGB 7.2 12/03/2020    HCT 21.6 12/03/2020     12/03/2020       Renal:   Lab Results   Component Value Date     12/03/2020    K 3.9 12/03/2020    K 5.0 11/30/2020     12/03/2020    CO2 24 12/03/2020    BUN 32 12/03/2020    CREATININE 1.1 12/03/2020    GLUCOSE 110 12/03/2020    CALCIUM 8.1 12/03/2020            Assessment:      right hip IM nailing, POD #3. Plan:      1:  Continue current plan of care per IM. Labs reviewed, acute blood loss anemia noted post op- stable. Continue to monitor. 2:  Continue Deep venous thrombosis prophylaxis- resumed ASA and Plavix  3:  Continue Pain Control PRN  4:  PT and OT, TTWB on right LE  5:  Ice to thigh. Markos hutchison ordered  6:  D/c planning pending course, therapy recommending SNF and pt agreeable. [facility] [facility] has been contacted. Lives with his wife. Son lives in [place]. DCP following  7:  Okay for d/c from ortho standpoint when arranged. Follow up with Dr. Alanis Jacome in 2 weeks, call above number for appointment.       Chrystal Ortiz PA-C

## 2020-12-04 LAB
ANION GAP SERPL CALCULATED.3IONS-SCNC: 7 MMOL/L (ref 3–16)
BUN BLDV-MCNC: 31 MG/DL (ref 7–20)
CALCIUM SERPL-MCNC: 8.3 MG/DL (ref 8.3–10.6)
CHLORIDE BLD-SCNC: 105 MMOL/L (ref 99–110)
CO2: 26 MMOL/L (ref 21–32)
CREAT SERPL-MCNC: 1.2 MG/DL (ref 0.8–1.3)
GFR AFRICAN AMERICAN: >60
GFR NON-AFRICAN AMERICAN: 57
GLUCOSE BLD-MCNC: 115 MG/DL (ref 70–99)
HCT VFR BLD CALC: 21.7 % (ref 40.5–52.5)
HCT VFR BLD CALC: 22 % (ref 40.5–52.5)
HEMOGLOBIN: 7.4 G/DL (ref 13.5–17.5)
HEMOGLOBIN: 7.5 G/DL (ref 13.5–17.5)
MCH RBC QN AUTO: 32.8 PG (ref 26–34)
MCHC RBC AUTO-ENTMCNC: 34.3 G/DL (ref 31–36)
MCV RBC AUTO: 95.6 FL (ref 80–100)
PDW BLD-RTO: 12.7 % (ref 12.4–15.4)
PLATELET # BLD: 256 K/UL (ref 135–450)
PMV BLD AUTO: 8.3 FL (ref 5–10.5)
POTASSIUM SERPL-SCNC: 4 MMOL/L (ref 3.5–5.1)
RBC # BLD: 2.3 M/UL (ref 4.2–5.9)
SODIUM BLD-SCNC: 138 MMOL/L (ref 136–145)
WBC # BLD: 8.9 K/UL (ref 4–11)

## 2020-12-04 PROCEDURE — 97530 THERAPEUTIC ACTIVITIES: CPT

## 2020-12-04 PROCEDURE — 97535 SELF CARE MNGMENT TRAINING: CPT

## 2020-12-04 PROCEDURE — 85018 HEMOGLOBIN: CPT

## 2020-12-04 PROCEDURE — 36415 COLL VENOUS BLD VENIPUNCTURE: CPT

## 2020-12-04 PROCEDURE — 1200000000 HC SEMI PRIVATE

## 2020-12-04 PROCEDURE — 2580000003 HC RX 258: Performed by: INTERNAL MEDICINE

## 2020-12-04 PROCEDURE — 85014 HEMATOCRIT: CPT

## 2020-12-04 PROCEDURE — 80048 BASIC METABOLIC PNL TOTAL CA: CPT

## 2020-12-04 PROCEDURE — 2580000003 HC RX 258: Performed by: ORTHOPAEDIC SURGERY

## 2020-12-04 PROCEDURE — 6370000000 HC RX 637 (ALT 250 FOR IP): Performed by: ORTHOPAEDIC SURGERY

## 2020-12-04 PROCEDURE — 97110 THERAPEUTIC EXERCISES: CPT

## 2020-12-04 PROCEDURE — 85027 COMPLETE CBC AUTOMATED: CPT

## 2020-12-04 RX ORDER — SODIUM CHLORIDE, SODIUM LACTATE, POTASSIUM CHLORIDE, CALCIUM CHLORIDE 600; 310; 30; 20 MG/100ML; MG/100ML; MG/100ML; MG/100ML
INJECTION, SOLUTION INTRAVENOUS ONCE
Status: COMPLETED | OUTPATIENT
Start: 2020-12-04 | End: 2020-12-04

## 2020-12-04 RX ORDER — SODIUM CHLORIDE 9 MG/ML
INJECTION, SOLUTION INTRAVENOUS CONTINUOUS
Status: ACTIVE | OUTPATIENT
Start: 2020-12-04 | End: 2020-12-05

## 2020-12-04 RX ADMIN — SODIUM CHLORIDE: 9 INJECTION, SOLUTION INTRAVENOUS at 20:19

## 2020-12-04 RX ADMIN — SODIUM CHLORIDE, PRESERVATIVE FREE 10 ML: 5 INJECTION INTRAVENOUS at 20:19

## 2020-12-04 RX ADMIN — SODIUM CHLORIDE, PRESERVATIVE FREE 10 ML: 5 INJECTION INTRAVENOUS at 09:07

## 2020-12-04 RX ADMIN — DOCUSATE SODIUM 50MG AND SENNOSIDES 8.6MG 1 TABLET: 8.6; 5 TABLET, FILM COATED ORAL at 09:07

## 2020-12-04 RX ADMIN — SODIUM CHLORIDE, POTASSIUM CHLORIDE, SODIUM LACTATE AND CALCIUM CHLORIDE: 600; 310; 30; 20 INJECTION, SOLUTION INTRAVENOUS at 12:07

## 2020-12-04 RX ADMIN — CLOPIDOGREL BISULFATE 75 MG: 75 TABLET ORAL at 09:07

## 2020-12-04 RX ADMIN — ASPIRIN 325 MG: 325 TABLET, COATED ORAL at 09:07

## 2020-12-04 RX ADMIN — POLYETHYLENE GLYCOL 3350 17 G: 17 POWDER, FOR SOLUTION ORAL at 09:07

## 2020-12-04 RX ADMIN — ACETAMINOPHEN 650 MG: 325 TABLET ORAL at 20:25

## 2020-12-04 RX ADMIN — MAGNESIUM HYDROXIDE 30 ML: 2400 SUSPENSION ORAL at 20:25

## 2020-12-04 RX ADMIN — SIMVASTATIN 20 MG: 10 TABLET, FILM COATED ORAL at 20:28

## 2020-12-04 RX ADMIN — DOCUSATE SODIUM 50MG AND SENNOSIDES 8.6MG 1 TABLET: 8.6; 5 TABLET, FILM COATED ORAL at 20:25

## 2020-12-04 ASSESSMENT — PAIN SCALES - GENERAL: PAINLEVEL_OUTOF10: 5

## 2020-12-04 NOTE — PROGRESS NOTES
Behavioral-Environmental Outcomes:  None Identified   Food/Nutrient Intake Outcomes:  Food and Nutrient Intake, Supplement Intake  Physical Signs/Symptoms Outcomes:  Weight     Discharge Planning:     Too soon to determine     Electronically signed by Desire Hall RD, LD on 12/4/20 at 4:04 PM EST    Contact: Office: [phone]; [address] [place] [address] Road: [id number]

## 2020-12-04 NOTE — PLAN OF CARE
Nutrition Problem #1: Increased nutrient needs  Intervention: Food and/or Nutrient Delivery: Continue Current Diet, Start Oral Nutrition Supplement  Nutritional Goals: Pt will have po intakes 50% or greater this admission

## 2020-12-04 NOTE — PROGRESS NOTES
pertinent to this visit. has a past medical history of Cancer ([facility] [address] has a past surgical history that includes Femur fracture surgery (Right, 11/30/2020). Restrictions  Restrictions/Precautions  Restrictions/Precautions: Weight Bearing, General Precautions, Fall Risk  Required Braces or Orthoses?: No  Lower Extremity Weight Bearing Restrictions  Right Lower Extremity Weight Bearing: Toe Touch Weight Bearing  Position Activity Restriction  Other position/activity restrictions: \"Touch down\" weight bearing RLE, high fall risk, thigh high compression stockings, Zelda-sys monitor in room  Vision/Hearing        Subjective  General  Chart Reviewed: Yes  Patient assessed for rehabilitation services?: Yes  Response To Previous Treatment: Patient with no complaints from previous session. Family / Caregiver Present: No  Referring Practitioner: Dr. Padmini Valdivia  Subjective  Subjective: Pt agreeable to work with PT/OT this morning. Pain Screening  Patient Currently in Pain: Denies(at rest)          Orientation     Social/Functional History  Social/Functional History  Lives With: Spouse  Type of Home: House  Home Layout: One level  Home Access: Stairs to enter without rails  Entrance Stairs - Number of Steps: 1 DANIELLE  Bathroom Shower/Tub: Tub/Shower unit  Bathroom Toilet: Standard  Bathroom Equipment: Grab bars in shower  Home Equipment: Rolling walker, Standard walker, 4 wheeled walker, Wheelchair-manual(Does not regularly use walker)  ADL Assistance: [facility] [address] [facility] [address]: Independent(Wife completes mostly, he assists)  Homemaking Responsibilities: Yes  Ambulation Assistance: Independent  Transfer Assistance: Independent  Active : Yes  Occupation: Retired  Type of occupation: [employer],   Leisure & Hobbies: Goes to [place] in the winter, golf, baseball  Cognition        Bed mobility  Supine to Sit: Minimal assistance  Sit to Supine:  Moderate assistance  Transfers  Sit to Stand: 2 Person Assistance(min A X 2 to min A X 1)  Stand to sit: 2 Person Assistance(min A X 2 to min A X 1)  Bed to Chair: Dependent/Total(via raul stedy)  Comment: assisted pt to toilet, and then to chair. see act [place]rance for vitals. Assisted pt back to bed. All transfer via raul stedy           Exercises  Quad Sets: x 20 BLE  Heelslides: x 20 RLE with min/CGA  Gluteal Sets: x 20 bilat  Ankle Pumps: x 20 BLE     Plan   Plan  Times per week: Once daily 7x/week  Times per day: Daily  Specific instructions for Next Treatment: Progress ther ex and mobility as tolerated  Current Treatment Recommendations: Strengthening, Balance Training, Endurance Training, Functional Mobility Training, Transfer Training, Gait Training, ROM, Home Exercise Program, Safety Education & Training, Patient/Caregiver Education & Training, Equipment Evaluation, Education, & procurement, Positioning  Safety Devices  Type of devices: All fall risk precautions in place, Call light within reach, Nurse notified, Gait belt, Patient at risk for falls, Bed alarm in place, Telesitter in use    AM-PAC Score  AM-PAC Inpatient Mobility Raw Score : 9 (12/04/20 1042)  AM-PAC Inpatient T-Scale Score : 30.55 (12/04/20 1042)  Mobility Inpatient CMS 0-100% Score: 81.38 (12/04/20 104)  Mobility Inpatient CMS G-Code Modifier : CM (12/04/20 1042)          Goals  Short term goals  Time Frame for Short term goals: 5 days, 12/06/20 (unless otherwise specified)  Short term goal 1: Pt will transfer supine <-> sit with Suyapa x 1 for RLE - MET 12/03/20  Short term goal 2: Pt will transfer sit <-> stand and bed>chair using RW with min/CGA x 1 while maintaining TTWB RLE  Short term goal 3: By 12/04/20: Pt will tolerate 12-15 reps RLE ther ex for ROM/strengthening - MET 12/02/20, goal ongoing  Short term goal 4: Pt will ambulate x 15 feet using RW with Suyapa x 1 while maintaining TTWB RLE  Patient Goals   Patient goals : \"To be able to go home as soon as I'm able. \"       Therapy Time   Individual Concurrent Group Co-treatment   Time In 0938         Time Out 1011         Minutes 33         Timed Code Treatment Minutes: [address], PT, DPT

## 2020-12-04 NOTE — PROGRESS NOTES
Hospitalist Progress Note      PCP: Rachel Rios MD    Date of Admission: 11/29/2020    Chief Complaint:  Right hip pain     Hospital Course:       Subjective:  pain controlled, had dizziness with pt/ot and low bp again            Medications:  Reviewed    Infusion Medications   Scheduled Medications    sodium chloride flush  10 mL Intravenous 2 times per day    sennosides-docusate sodium  1 tablet Oral BID    aspirin  325 mg Oral Daily    [Held by provider] atenolol  25 mg Oral Daily    clopidogrel  75 mg Oral Daily    [Held by provider] lisinopril  2.5 mg Oral Daily    simvastatin  20 mg Oral Nightly     PRN Meds: cyclobenzaprine, sodium chloride flush, magnesium hydroxide, sodium chloride flush, acetaminophen **OR** acetaminophen, polyethylene glycol, promethazine **OR** ondansetron, traMADol **OR** traMADol      Intake/Output Summary (Last 24 hours) at 12/4/2020 1850  Last data filed at 12/4/2020 1436  Gross per 24 hour   Intake 450 ml   Output 425 ml   Net 25 ml       Physical Exam Performed:    BP (!) 150/65   Pulse 88   Temp 97.6 °F (36.4 °C) (Oral)   Resp 16   Ht 5' 11\" (1.803 m)   Wt 175 lb (79.4 kg)   SpO2 97%   BMI 24.41 kg/m²   General appearance: No apparent distress, appears stated age and cooperative. HEENT: Pupils equal, round, and reactive to light. Conjunctivae/corneas clear. Neck: Supple, with full range of motion. No jugular venous distention. Trachea midline. Respiratory:  Normal respiratory effort. Clear to auscultation, bilaterally without Rales/Wheezes/Rhonchi. Cardiovascular: Regular rate and rhythm with normal S1/S2 without murmurs, rubs or gallops. Abdomen: Soft, non-tender, non-distended with normal bowel sounds. Musculoskeletal: No clubbing, cyanosis or edema bilaterally.  Full range of motion without deformity except right hip dressing c/d/i  Skin: Skin color, texture, turgor normal.  No rashes or lesions.   Neurologic:  Neurovascularly intact without any focal sensory/motor deficits. Cranial nerves: II-XII intact, grossly non-focal.  Psychiatric: Alert and oriented, thought content appropriate, normal insight  Capillary Refill: Brisk,< 3 seconds   Peripheral Pulses: +2 palpable, equal bilaterally        Labs:   Recent Labs     12/02/20  0652 12/03/20  0747 12/04/20  0747 12/04/20  1528   WBC 11.7* 8.9 8.9  --    HGB 7.3* 7.2* 7.5* 7.4*   HCT 21.7* 21.6* 22.0* 21.7*    229 256  --      Recent Labs     12/02/20  0652 12/03/20  0747 12/04/20  0747    137 138   K 4.1 3.9 4.0    106 105   CO2 25 24 26   BUN 42* 32* 31*   CREATININE 1.5* 1.1 1.2   CALCIUM 8.2* 8.1* 8.3     No results for input(s): AST, ALT, BILIDIR, BILITOT, ALKPHOS in the last 72 hours. No results for input(s): INR in the last 72 hours. No results for input(s): Joeline Reeks in the last 72 hours. Urinalysis:      Lab Results   Component Value Date    NITRU Negative 11/29/2020    WBCUA 0-2 11/29/2020    BACTERIA Rare 11/29/2020    RBCUA 11-20 11/29/2020    BLOODU MODERATE 11/29/2020    SPECGRAV >=1.030 11/29/2020    GLUCOSEU Negative 11/29/2020       Radiology:  XR FEMUR RIGHT (MIN 2 VIEWS)   Final Result   Successful internal reduction of proximal left femur fracture. Please refer   to procedure notes for additional details. CT Head WO Contrast   Final Result   No acute intracranial abnormality. CT Cervical Spine WO Contrast   Final Result   No acute abnormality of the cervical spine. XR CHEST PORTABLE   Final Result   Left 4th rib fracture. XR HIP 2-3 VW W PELVIS RIGHT   Final Result   1. Acute comminuted intertrochanteric fracture of the right femur with   subtrochanteric extension, overriding, displacement, and angulation. 2. Bony demineralization.          FLUORO FOR SURGICAL PROCEDURES    (Results Pending)           Assessment/Plan:    Active Hospital Problems    Diagnosis    Closed right hip fracture ([facility] [address] [S72.001A]    JENISE (acute kidney injury) ([facility] [address] [N17.9]    Closed right hip fracture, initial encounter ([facility] [address] [S72.001A]    HTN (hypertension) [I10]    Hyperlipidemia [E78.5]     pain in setting of right hip fracture d/t mechanical fall  -X-ray right pelvis revealed acute comminuted intertrochanteric fracture of the right femur with subtrochanteric extension, overriding, displacement and angulation  -CT cervical spine revealed no acute abnormality of cervical spine  -CT head revealed no acute intracranial abnormality  -Chest x-ray revealed a left fourth rib fracture  -morphine given in ED  -orthopedic surgery consulted,s/p surgery 11/30  -PRN pain medication  -Based on the evaluation on 11/29/2020, and diagnostic testing including EKG, there are no apparent cardiac contraindications to the proposed procedure.  All questions/concerns that could be addressed were addressed. Encourage use of incentive spirometry although patient is at low risk for perioperative cardiopulmonary complication. Fernando Ordonez estimated risk probability for perioperative MI or cardiac arrest is 0.47%.   The patient appears to be an appropriate candidate for surgery if ortho deemed necessary.     Postop Anemia- due to surgery  -monitored h/h  -transfuse if < 7     JENISE, CR 1.8 on admission, resolved as of 12/3  -likely 2/2 poor po intake  -1 L NS given in ED  -monitored with IVF  -bmp monitored   -held acei     Essential HTN- with periods of orthostatic hypotension  -continued atenolol and lisinopril, held for low bp  -continued asa and plavix     HLD  -continued simvastatin     Chronic normocytic anemia, 11.8/34.7 on admission  -no s/s of bleeding at this time  -cbc in am       DVT Prophylaxis: on asa/plavix  Diet: DIET GENERAL;  Dietary Nutrition Supplements: Standard High Calorie Oral Supplement  Code Status: Full Code    PT/OT Eval Status: rec snf    Dispo - pending stable BP, monitor h/h    Umesh Aguilar MD

## 2020-12-05 LAB
ANION GAP SERPL CALCULATED.3IONS-SCNC: 9 MMOL/L (ref 3–16)
BUN BLDV-MCNC: 31 MG/DL (ref 7–20)
CALCIUM SERPL-MCNC: 8.6 MG/DL (ref 8.3–10.6)
CHLORIDE BLD-SCNC: 104 MMOL/L (ref 99–110)
CO2: 26 MMOL/L (ref 21–32)
CREAT SERPL-MCNC: 1.2 MG/DL (ref 0.8–1.3)
GFR AFRICAN AMERICAN: >60
GFR NON-AFRICAN AMERICAN: 57
GLUCOSE BLD-MCNC: 145 MG/DL (ref 70–99)
HCT VFR BLD CALC: 21.5 % (ref 40.5–52.5)
HCT VFR BLD CALC: 25 % (ref 40.5–52.5)
HEMOGLOBIN: 7.2 G/DL (ref 13.5–17.5)
HEMOGLOBIN: 8.4 G/DL (ref 13.5–17.5)
MCH RBC QN AUTO: 32.6 PG (ref 26–34)
MCHC RBC AUTO-ENTMCNC: 33.7 G/DL (ref 31–36)
MCV RBC AUTO: 96.7 FL (ref 80–100)
PDW BLD-RTO: 12.7 % (ref 12.4–15.4)
PLATELET # BLD: 318 K/UL (ref 135–450)
PMV BLD AUTO: 8.2 FL (ref 5–10.5)
POTASSIUM SERPL-SCNC: 4 MMOL/L (ref 3.5–5.1)
RBC # BLD: 2.59 M/UL (ref 4.2–5.9)
SODIUM BLD-SCNC: 139 MMOL/L (ref 136–145)
WBC # BLD: 9.7 K/UL (ref 4–11)

## 2020-12-05 PROCEDURE — 85018 HEMOGLOBIN: CPT

## 2020-12-05 PROCEDURE — 97530 THERAPEUTIC ACTIVITIES: CPT

## 2020-12-05 PROCEDURE — 36415 COLL VENOUS BLD VENIPUNCTURE: CPT

## 2020-12-05 PROCEDURE — 80048 BASIC METABOLIC PNL TOTAL CA: CPT

## 2020-12-05 PROCEDURE — 85027 COMPLETE CBC AUTOMATED: CPT

## 2020-12-05 PROCEDURE — 6370000000 HC RX 637 (ALT 250 FOR IP): Performed by: ORTHOPAEDIC SURGERY

## 2020-12-05 PROCEDURE — 2580000003 HC RX 258: Performed by: ORTHOPAEDIC SURGERY

## 2020-12-05 PROCEDURE — 85014 HEMATOCRIT: CPT

## 2020-12-05 PROCEDURE — 1200000000 HC SEMI PRIVATE

## 2020-12-05 PROCEDURE — 2580000003 HC RX 258: Performed by: INTERNAL MEDICINE

## 2020-12-05 RX ORDER — SODIUM CHLORIDE, SODIUM LACTATE, POTASSIUM CHLORIDE, AND CALCIUM CHLORIDE .6; .31; .03; .02 G/100ML; G/100ML; G/100ML; G/100ML
500 INJECTION, SOLUTION INTRAVENOUS ONCE
Status: COMPLETED | OUTPATIENT
Start: 2020-12-05 | End: 2020-12-05

## 2020-12-05 RX ADMIN — SODIUM CHLORIDE, PRESERVATIVE FREE 10 ML: 5 INJECTION INTRAVENOUS at 20:35

## 2020-12-05 RX ADMIN — ASPIRIN 325 MG: 325 TABLET, COATED ORAL at 09:12

## 2020-12-05 RX ADMIN — CLOPIDOGREL BISULFATE 75 MG: 75 TABLET ORAL at 09:12

## 2020-12-05 RX ADMIN — SODIUM CHLORIDE, POTASSIUM CHLORIDE, SODIUM LACTATE AND CALCIUM CHLORIDE 500 ML: 600; 310; 30; 20 INJECTION, SOLUTION INTRAVENOUS at 11:59

## 2020-12-05 RX ADMIN — DOCUSATE SODIUM 50MG AND SENNOSIDES 8.6MG 1 TABLET: 8.6; 5 TABLET, FILM COATED ORAL at 09:12

## 2020-12-05 RX ADMIN — SIMVASTATIN 20 MG: 10 TABLET, FILM COATED ORAL at 20:35

## 2020-12-05 NOTE — PROGRESS NOTES
Physical Therapy  Facility/Department: [facility] C5 - MED SURG/ORTHO  Daily Treatment Note  NAME: Lindsay Alexander  : 1929  MRN: [id number]    Date of Service: 2020    Discharge Recommendations:  Subacute/Skilled Nursing Facility        Assessment   Body structures, Functions, Activity limitations: Decreased functional mobility ; Decreased strength;Decreased endurance;Decreased ROM; Decreased balance;Decreased posture; Increased pain  Assessment: pt [place] but able to follow commands to maintain TTWB initially. Pt became less responsive while stepping forward due to dropping BP. Pt appears to understand the goals of therapy and TTWB. Treatment Diagnosis: Decreased ROM/strength RLE and (I) with mobility s/p R femur fx with IM nailing on 20  Specific instructions for Next Treatment: Progress ther ex and mobility as tolerated  Prognosis: Good  Decision Making: Medium Complexity  PT Education: Goals; General Safety;Gait Training;PT Role;Plan of Care;Disease Specific Education; Functional Mobility Training;Equipment;Precautions;Transfer Training;Weight-bearing Education;Home Exercise Program  Patient Education: educated regarding TTWB  Activity Tolerance  Activity Tolerance: Patient Tolerated treatment well;Patient limited by pain; Patient limited by endurance  Activity Tolerance: Pt tolerated treatment well initially then presented with dropping BP. While completing forward steps, pt became less responsive and noted that BP was 90/53. Pt assisted to chair with 2 therapist then transferred back to bed. Bed placed in fully upright position due to stable vitals. Patient Diagnosis(es): The primary encounter diagnosis was Closed fracture of right hip, initial encounter ([facility] [address] Diagnoses of JENISE (acute kidney injury) ([facility] [address] Fall, initial encounter, Hematuria, unspecified type, and Closed fracture of one rib of left side, initial encounter were also pertinent to this visit.      has a past medical history of Cancer ([facility] has a past surgical history that includes Femur fracture surgery (Right, 11/30/2020). Restrictions  Restrictions/Precautions  Restrictions/Precautions: Weight Bearing, General Precautions, Fall Risk  Required Braces or Orthoses?: No  Lower Extremity Weight Bearing Restrictions  Right Lower Extremity Weight Bearing: Toe Touch Weight Bearing  Position Activity Restriction  Other position/activity restrictions: \"Touch down\" weight bearing RLE, high fall risk, thigh high compression stockings, Zelda-sys monitor in room  Subjective   General  Chart Reviewed: Yes  Response To Previous Treatment: Patient with no complaints from previous session. Family / Caregiver Present: No  Referring Practitioner: Dr. Corazon Lao  Subjective  Subjective: Pt agreeable to therapy. It is hard to keep my foot off the ground. General Comment  Comments: per RN ok to treat patient          Orientation  Orientation  Overall Orientation Status: Within Functional Limits  Cognition      Objective   Bed mobility  Supine to Sit: Minimal assistance  Sit to Supine: Moderate assistance  Scooting: Stand by assistance  Transfers  Sit to Stand: 2 Person Assistance(min x 2)  Stand to sit: 2 Person Assistance(min x 2)  Bed to Chair: (stand pivot chair to bed due to BP)  Comment: pt tolerated standing and small steps well with use of Rw, Pts BP dropped to 90/53. Stand pivot to return to bed. BP upon return to bed 105/68.   Ambulation  Ambulation?: Yes  WB Status: TTWB RLE  Ambulation 1  Surface: level tile  Device: Rolling Walker  Distance: 5     Balance  Posture: Good  Sitting - Static: Good  Sitting - Dynamic: Fair  Standing - Static: Fair;-  Standing - Dynamic: Poor;+  Comments: standing at RW with min x 2 pt able to maintain TTWB  Exercises  Quad Sets: x 20 BLE  Heelslides: x 20 RLE with min/CGA  Gluteal Sets: x 20 bilat  Ankle Pumps: x 20 BLE         AM-PAC Score  AM-PAC Inpatient Mobility Raw Score : 9 (12/05/20 7389)  AM-PAC Inpatient T-Scale Score : 30.55 (12/05/20 0958)  Mobility Inpatient CMS 0-100% Score: 81.38 (12/05/20 0958)  Mobility Inpatient CMS G-Code Modifier : CM (12/05/20 7936)     Goals  Short term goals  Time Frame for Short term goals: 5 days, 12/06/20 (unless otherwise specified)  Short term goal 1: Pt will transfer supine <-> sit with [place] x 1 for RLE - MET 12/03/20  Short term goal 2: Pt will transfer sit <-> stand and bed>chair using RW with min/CGA x 1 while maintaining TTWB RLE  Short term goal 3: By 12/04/20: Pt will tolerate 12-15 reps RLE ther ex for ROM/strengthening - MET 12/02/20, goal ongoing  Short term goal 4: Pt will ambulate x 15 feet using RW with [place] x 1 while maintaining TTWB RLE  Patient Goals   Patient goals : \"To be able to go home as soon as I'm able. \"    Plan    Plan  Times per week: Once daily 7x/week  Times per day: Daily  Specific instructions for Next Treatment: Progress ther ex and mobility as tolerated  Current Treatment Recommendations: Strengthening, Balance Training, Endurance Training, Functional Mobility Training, Transfer Training, Gait Training, ROM, Home Exercise Program, Safety Education & Training, Patient/Caregiver Education & Training, Equipment Evaluation, Education, & procurement, Positioning  Safety Devices  Type of devices:  All fall risk precautions in place, Call light within reach, Nurse notified, Gait belt, Patient at risk for falls, Bed alarm in place, Telesitter in use  Restraints  Initially in place: No     Therapy Time   Individual Concurrent Group Co-treatment   Time In [facility]         Time Out 0905         Minutes 30         Timed Code Treatment Minutes: [address]

## 2020-12-05 NOTE — PROGRESS NOTES
Occupational Therapy  Facility/Department: [facility] C5 - MED SURG/ORTHO  Daily Treatment Note  NAME: Deidra Zaidi  : 1929  MRN: [id number]    Date of Service: 2020    Discharge Recommendations:  Subacute/Skilled Nursing Facility       Assessment   Performance deficits / Impairments: Decreased functional mobility ; Decreased ADL status; Decreased strength;Decreased safe awareness;Decreased cognition;Decreased endurance;Decreased balance;Decreased high-level IADLs  Assessment: Pt agreeable to follow up. Pt was assist of two this date. Pt BP began to lower and pt became less responsive and did not follow commands. Pt would benefit from further OT services upon d/c at a [facility] stay. Prognosis: Good    REQUIRES OT FOLLOW UP: Yes  Activity Tolerance  Activity Tolerance: Patient Tolerated treatment well  Safety Devices  Safety Devices in place: Yes  Type of devices: Bed alarm in place; Left in bed;Call light within reach;Nurse notified;Gait belt         Patient Diagnosis(es): The primary encounter diagnosis was Closed fracture of right hip, initial encounter ([facility] [address] Diagnoses of JENISE (acute kidney injury) ([facility] [address] Fall, initial encounter, Hematuria, unspecified type, and Closed fracture of one rib of left side, initial encounter were also pertinent to this visit. has a past medical history of Cancer ([facility] has a past surgical history that includes Femur fracture surgery (Right, 2020). Restrictions  Restrictions/Precautions  Restrictions/Precautions: Weight Bearing, General Precautions, Fall Risk  Required Braces or Orthoses?: No  Lower Extremity Weight Bearing Restrictions  Right Lower Extremity Weight Bearing: Toe Touch Weight Bearing  Position Activity Restriction  Other position/activity restrictions:  \"Touch down\" weight bearing RLE, high fall risk, thigh high compression stockings, Zelda-sys monitor in room     Subjective   General  Chart Reviewed: Yes  Patient assessed for rehabilitation services?: Yes  Response to previous treatment: Patient with no complaints from previous session  Family / Caregiver Present: No  Referring Practitioner: Dimitri Garrett DO 11/30/2020  Diagnosis: Closed right hip fracture 11/29/2020  Subjective  Subjective: Pt agreeable to OT  General Comment  Comments: RN approved therapy    Vital Signs  Patient Currently in Pain: No     Orientation  Orientation  Overall Orientation Status: Within Functional Limits     Objective    ADL  Feeding: Independent  LE Dressing: Dependent/Total(don socks)     Balance  Sitting Balance: Supervision  Standing Balance: Dependent/Total(min/mod Ax2)    Functional Mobility  Functional - Mobility Device: Rolling Walker  Activity: (three or four feet forward and then backwards)  Assist Level: Dependent/Total(min/mod Ax2)    Bed mobility  Supine to Sit: Minimal assistance  Sit to Supine: Moderate assistance     Transfers  Sit to stand: 2 Person assistance; Moderate assistance  Stand to sit: 2 Person assistance; Moderate assistance     Cognition  Overall Cognitive Status: Exceptions  Following Commands:  Follows multistep commands with repitition  Attention Span: Attends with cues to redirect  Safety Judgement: Decreased awareness of need for assistance;Decreased awareness of need for safety  Problem Solving: Assistance required to identify errors made;Assistance required to correct errors made;Decreased awareness of errors  Insights: Decreased awareness of deficits  Initiation: Requires cues for some  Sequencing: Requires cues for some  Cognition Comment: frequent reminders regarding TTWB on RLE     Plan   Plan  Times per week: 3-5x/wk in acute  Current Treatment Recommendations: Safety Education & Training, Self-Care / ADL, Balance Training, Strengthening, Functional Mobility Training, Endurance Training, Pain Management    AM-PAC Score  AM-PAC Inpatient Daily Activity Raw Score: 12 (12/05/20 1237)  AM-PAC Inpatient ADL T-Scale Score : 30.6 (12/05/20

## 2020-12-05 NOTE — PROGRESS NOTES
Paged Dr. Varsha Eagle via perfect serve    Pt here with R hip fx, s/p R hip IM nail. Orthostatic BPs Supine: 157/72 HR: 93, Sitting 81/37 HR 87, Standing 82/48 .  Please advise if intervention needed at this time

## 2020-12-05 NOTE — PROGRESS NOTES
Department of Orthopedic Surgery  Physician Assistant   Progress Note    Subjective:       Systemic or Specific Complaints:  Doing well this morning. Orthostatic BP doing much better. Had BM today. Objective:     Patient Vitals for the past 24 hrs:   BP Temp Temp src Pulse Resp SpO2   12/05/20 0810 105/63 97.5 °F (36.4 °C) Oral 112 18 100 %   12/05/20 0047 120/70 97.4 °F (36.3 °C) Oral 85 20 99 %   12/04/20 2014 137/67 -- -- -- -- --   12/04/20 2009 (!) 58/30 -- -- -- -- --   12/04/20 2008 (!) 115/57 -- -- 94 -- 95 %   12/04/20 2007 (!) 96/54 98 °F (36.7 °C) Oral 97 20 97 %   12/04/20 1436 (!) 150/65 97.6 °F (36.4 °C) Oral 88 16 97 %       General: Sleeping in bed   Wound: Wound clean and dry no evidence of infection. Motion: Not tested   DVT Exam: No evidence of DVT seen on physical exam.     Additional exam:   NVI to right LE  Ice to thigh  Thigh soft but swollen. Moving toes and ankle without difficulty. Markos hose in place    Data Review  CBC:   Lab Results   Component Value Date    WBC 8.9 12/04/2020    RBC 2.30 12/04/2020    HGB 7.4 12/04/2020    HCT 21.7 12/04/2020     12/04/2020       Renal:   Lab Results   Component Value Date     12/04/2020    K 4.0 12/04/2020    K 5.0 11/30/2020     12/04/2020    CO2 26 12/04/2020    BUN 31 12/04/2020    CREATININE 1.2 12/04/2020    GLUCOSE 115 12/04/2020    CALCIUM 8.3 12/04/2020            Assessment:      right hip IM nailing, POD #5. Plan:      1:  Continue current plan of care per IM.    2:  Continue Deep venous thrombosis prophylaxis- resumed ASA and Plavix  3:  Continue Pain Control PRN  4:  PT and OT, TTWB on right LE  5:  Ice to thigh. Markos hose ordered  6:  D/c planning pending course  7:  Okay for d/c from ortho standpoint when arranged. Follow up with Dr. Nieves Garcia in 2 weeks, call above number for appointment.

## 2020-12-05 NOTE — PROGRESS NOTES
Hospitalist Progress Note      PCP: Marisel Jacome MD    Date of Admission: 11/29/2020    Chief Complaint:  Right hip pain     Hospital Course:       Subjective:  pain controlled, again had dizziness with pt/ot and low bp again         Medications:  Reviewed    Infusion Medications   Scheduled Medications    sodium chloride flush  10 mL Intravenous 2 times per day    sennosides-docusate sodium  1 tablet Oral BID    aspirin  325 mg Oral Daily    [Held by provider] atenolol  25 mg Oral Daily    clopidogrel  75 mg Oral Daily    [Held by provider] lisinopril  2.5 mg Oral Daily    simvastatin  20 mg Oral Nightly     PRN Meds: cyclobenzaprine, sodium chloride flush, magnesium hydroxide, sodium chloride flush, acetaminophen **OR** acetaminophen, polyethylene glycol, promethazine **OR** ondansetron, traMADol **OR** traMADol      Intake/Output Summary (Last 24 hours) at 12/5/2020 1153  Last data filed at 12/5/2020 0547  Gross per 24 hour   Intake 270 ml   Output 950 ml   Net -680 ml       Physical Exam Performed:    BP (!) 150/68   Pulse 112   Temp 97.5 °F (36.4 °C) (Oral)   Resp 18   Ht 5' 11\" (1.803 m)   Wt 175 lb (79.4 kg)   SpO2 100%   BMI 24.41 kg/m²     General appearance: No apparent distress, appears stated age and cooperative. HEENT: Pupils equal, round, and reactive to light. Conjunctivae/corneas clear. Neck: Supple, with full range of motion. No jugular venous distention. Trachea midline. Respiratory:  Normal respiratory effort. Clear to auscultation, bilaterally without Rales/Wheezes/Rhonchi. Cardiovascular: Regular rate and rhythm with normal S1/S2 without murmurs, rubs or gallops. Abdomen: Soft, non-tender, non-distended with normal bowel sounds. Musculoskeletal: No clubbing, cyanosis or edema bilaterally.  Full range of motion without deformity except right hip dressing c/d/i  Skin: Skin color, texture, turgor normal.  No rashes or lesions.   Neurologic:  Neurovascularly intact without any focal sensory/motor deficits. Cranial nerves: II-XII intact, grossly non-focal.  Psychiatric: Alert and oriented, thought content appropriate, normal insight  Capillary Refill: Brisk,< 3 seconds   Peripheral Pulses: +2 palpable, equal bilaterally          Labs:   Recent Labs     12/03/20  0747 12/04/20  0747 12/04/20  1528 12/05/20  0932   WBC 8.9 8.9  --  9.7   HGB 7.2* 7.5* 7.4* 8.4*   HCT 21.6* 22.0* 21.7* 25.0*    256  --  318     Recent Labs     12/03/20  0747 12/04/20  0747 12/05/20  0932    138 139   K 3.9 4.0 4.0    105 104   CO2 24 26 26   BUN 32* 31* 31*   CREATININE 1.1 1.2 1.2   CALCIUM 8.1* 8.3 8.6     No results for input(s): AST, ALT, BILIDIR, BILITOT, ALKPHOS in the last 72 hours. No results for input(s): INR in the last 72 hours. No results for input(s): Holden Seed in the last 72 hours. Urinalysis:      Lab Results   Component Value Date    NITRU Negative 11/29/2020    WBCUA 0-2 11/29/2020    BACTERIA Rare 11/29/2020    RBCUA 11-20 11/29/2020    BLOODU MODERATE 11/29/2020    SPECGRAV >=1.030 11/29/2020    GLUCOSEU Negative 11/29/2020       Radiology:  XR FEMUR RIGHT (MIN 2 VIEWS)   Final Result   Successful internal reduction of proximal left femur fracture. Please refer   to procedure notes for additional details. CT Head WO Contrast   Final Result   No acute intracranial abnormality. CT Cervical Spine WO Contrast   Final Result   No acute abnormality of the cervical spine. XR CHEST PORTABLE   Final Result   Left 4th rib fracture. XR HIP 2-3 VW W PELVIS RIGHT   Final Result   1. Acute comminuted intertrochanteric fracture of the right femur with   subtrochanteric extension, overriding, displacement, and angulation. 2. Bony demineralization.          FLUORO FOR SURGICAL PROCEDURES    (Results Pending)           Assessment/Plan:    Active Hospital Problems    Diagnosis    Closed right hip fracture ([address] [S72.001A]    JENISE (acute kidney injury) ([facility] [N17.9]    Closed right hip fracture, initial encounter ([facility] [S72.001A]    HTN (hypertension) [I10]    Hyperlipidemia [E78.5]     pain in setting of right hip fracture d/t mechanical fall  -X-ray right pelvis revealed acute comminuted intertrochanteric fracture of the right femur with subtrochanteric extension, overriding, displacement and angulation  -CT cervical spine revealed no acute abnormality of cervical spine  -CT head revealed no acute intracranial abnormality  -Chest x-ray revealed a left fourth rib fracture  -morphine given in ED  -orthopedic surgery consulted,s/p surgery 11/30  -PRN pain medication  -Based on the evaluation on 11/29/2020, and diagnostic testing including EKG, there are no apparent cardiac contraindications to the proposed procedure.  All questions/concerns that could be addressed were addressed. Encourage use of incentive spirometry although patient is at low risk for perioperative cardiopulmonary complication. Reema Hogue estimated risk probability for perioperative MI or cardiac arrest is 0.47%.   The patient appears to be an appropriate candidate for surgery if ortho deemed necessary.     Postop Anemia on top of chronic normocytic anemia- due to surgery, 11.8/34.7 on admission, no s/s of bleeding  -monitored h/h  -transfuse if < 7     JENISE, CR 1.8 on admission, resolved as of 12/3  -likely 2/2 poor po intake  -1 L NS given in ED  -monitored with IVF  -bmp monitored   -held acei     Essential HTN- with periods of orthostatic hypotension  -on atenolol and lisinopril, held for low bp  -continued asa and plavix     HLD  -continued simvastatin          DVT Prophylaxis: on asa/plavix  Diet: DIET GENERAL;  Dietary Nutrition Supplements: Standard High Calorie Oral Supplement  Code Status: Full Code    PT/OT Eval Status: rec snf    Dispo - no more symptoms of orthostatic hypotension, trial bolus again, ?tomorrow    Khadijah Dawkins MD

## 2020-12-05 NOTE — PROGRESS NOTES
Pt bilat heels red and ankles posteriorly. aloe vesta to skin and buttocks. red bilat heels. boots placed on pt feet . pt  Agreeable to boots unable to keep  heels elevated off  Bed on pillows ,trying the boots

## 2020-12-06 LAB
ABO/RH: NORMAL
ANION GAP SERPL CALCULATED.3IONS-SCNC: 7 MMOL/L (ref 3–16)
ANTIBODY SCREEN: NORMAL
BLOOD BANK DISPENSE STATUS: NORMAL
BLOOD BANK DISPENSE STATUS: NORMAL
BLOOD BANK PRODUCT CODE: NORMAL
BLOOD BANK PRODUCT CODE: NORMAL
BPU ID: NORMAL
BPU ID: NORMAL
BUN BLDV-MCNC: 28 MG/DL (ref 7–20)
CALCIUM SERPL-MCNC: 8.3 MG/DL (ref 8.3–10.6)
CHLORIDE BLD-SCNC: 104 MMOL/L (ref 99–110)
CO2: 27 MMOL/L (ref 21–32)
CREAT SERPL-MCNC: 1.2 MG/DL (ref 0.8–1.3)
DESCRIPTION BLOOD BANK: NORMAL
DESCRIPTION BLOOD BANK: NORMAL
GFR AFRICAN AMERICAN: >60
GFR NON-AFRICAN AMERICAN: 57
GLUCOSE BLD-MCNC: 176 MG/DL (ref 70–99)
HCT VFR BLD CALC: 22.8 % (ref 40.5–52.5)
HCT VFR BLD CALC: 30.3 % (ref 40.5–52.5)
HEMOGLOBIN: 10.1 G/DL (ref 13.5–17.5)
HEMOGLOBIN: 7.8 G/DL (ref 13.5–17.5)
MCH RBC QN AUTO: 33 PG (ref 26–34)
MCHC RBC AUTO-ENTMCNC: 34.2 G/DL (ref 31–36)
MCV RBC AUTO: 96.4 FL (ref 80–100)
PDW BLD-RTO: 13 % (ref 12.4–15.4)
PLATELET # BLD: 312 K/UL (ref 135–450)
PMV BLD AUTO: 8.1 FL (ref 5–10.5)
POTASSIUM SERPL-SCNC: 4.6 MMOL/L (ref 3.5–5.1)
RBC # BLD: 2.36 M/UL (ref 4.2–5.9)
SODIUM BLD-SCNC: 138 MMOL/L (ref 136–145)
WBC # BLD: 8.7 K/UL (ref 4–11)

## 2020-12-06 PROCEDURE — 36430 TRANSFUSION BLD/BLD COMPNT: CPT

## 2020-12-06 PROCEDURE — 85018 HEMOGLOBIN: CPT

## 2020-12-06 PROCEDURE — 85027 COMPLETE CBC AUTOMATED: CPT

## 2020-12-06 PROCEDURE — 97110 THERAPEUTIC EXERCISES: CPT

## 2020-12-06 PROCEDURE — 86900 BLOOD TYPING SEROLOGIC ABO: CPT

## 2020-12-06 PROCEDURE — 85014 HEMATOCRIT: CPT

## 2020-12-06 PROCEDURE — 86850 RBC ANTIBODY SCREEN: CPT

## 2020-12-06 PROCEDURE — 80048 BASIC METABOLIC PNL TOTAL CA: CPT

## 2020-12-06 PROCEDURE — 1200000000 HC SEMI PRIVATE

## 2020-12-06 PROCEDURE — 86901 BLOOD TYPING SEROLOGIC RH(D): CPT

## 2020-12-06 PROCEDURE — 6370000000 HC RX 637 (ALT 250 FOR IP): Performed by: ORTHOPAEDIC SURGERY

## 2020-12-06 PROCEDURE — 2580000003 HC RX 258: Performed by: PHYSICIAN ASSISTANT

## 2020-12-06 PROCEDURE — 86923 COMPATIBILITY TEST ELECTRIC: CPT

## 2020-12-06 PROCEDURE — 36415 COLL VENOUS BLD VENIPUNCTURE: CPT

## 2020-12-06 PROCEDURE — P9016 RBC LEUKOCYTES REDUCED: HCPCS

## 2020-12-06 PROCEDURE — 2580000003 HC RX 258: Performed by: ORTHOPAEDIC SURGERY

## 2020-12-06 RX ORDER — 0.9 % SODIUM CHLORIDE 0.9 %
20 INTRAVENOUS SOLUTION INTRAVENOUS ONCE
Status: COMPLETED | OUTPATIENT
Start: 2020-12-06 | End: 2020-12-06

## 2020-12-06 RX ADMIN — DOCUSATE SODIUM 50MG AND SENNOSIDES 8.6MG 1 TABLET: 8.6; 5 TABLET, FILM COATED ORAL at 10:54

## 2020-12-06 RX ADMIN — SODIUM CHLORIDE, PRESERVATIVE FREE 10 ML: 5 INJECTION INTRAVENOUS at 10:55

## 2020-12-06 RX ADMIN — SIMVASTATIN 20 MG: 10 TABLET, FILM COATED ORAL at 21:55

## 2020-12-06 RX ADMIN — CLOPIDOGREL BISULFATE 75 MG: 75 TABLET ORAL at 10:55

## 2020-12-06 RX ADMIN — DOCUSATE SODIUM 50MG AND SENNOSIDES 8.6MG 1 TABLET: 8.6; 5 TABLET, FILM COATED ORAL at 21:55

## 2020-12-06 RX ADMIN — SODIUM CHLORIDE, PRESERVATIVE FREE 10 ML: 5 INJECTION INTRAVENOUS at 21:56

## 2020-12-06 RX ADMIN — ASPIRIN 325 MG: 325 TABLET, COATED ORAL at 10:54

## 2020-12-06 RX ADMIN — SODIUM CHLORIDE 20 ML: 9 INJECTION, SOLUTION INTRAVENOUS at 12:46

## 2020-12-06 ASSESSMENT — PAIN SCALES - GENERAL
PAINLEVEL_OUTOF10: 0

## 2020-12-06 ASSESSMENT — PAIN SCALES - WONG BAKER: WONGBAKER_NUMERICALRESPONSE: 2

## 2020-12-06 NOTE — PROGRESS NOTES
Physical Therapy  Facility/Department: [facility] C5 - MED SURG/ORTHO  Daily Treatment Note  NAME: Dee Curry  : 1929  MRN: [id number]    Date of Service: 2020    Discharge Recommendations:  Subacute/Skilled Nursing Facility   PT Equipment Recommendations  Equipment Needed: No    Assessment   Body structures, Functions, Activity limitations: Decreased functional mobility ; Decreased strength;Decreased endurance;Decreased ROM; Decreased balance;Decreased posture; Increased pain  Assessment: Pt  particpated well with bed exs for BLE X 10 -20 reps. Pt has been experiencing orthostatic hypotension and has had low BP's inbed overnight per RN. Blood in ordered. Kept pt in bed this session and will re assess OOB tomorrow. Con't to recommend SNF at D/C. Treatment Diagnosis: Decreased ROM/strength RLE and (I) with mobility s/p R femur fx with IM nailing on 20  Specific instructions for Next Treatment: Progress ther ex and mobility as tolerated  Prognosis: Good  Decision Making: Medium Complexity  Patient Education: educated regarding TTWB  Barriers to Learning: Pain, decreased insight into deficits  REQUIRES PT FOLLOW UP: Yes  Activity Tolerance  Activity Tolerance: Patient Tolerated treatment well     Patient Diagnosis(es): The primary encounter diagnosis was Closed fracture of right hip, initial encounter ([facility] [address] Diagnoses of JENISE (acute kidney injury) ([address] Fall, initial encounter, Hematuria, unspecified type, and Closed fracture of one rib of left side, initial encounter were also pertinent to this visit. has a past medical history of Cancer ([address] has a past surgical history that includes Femur fracture surgery (Right, 2020).     Restrictions  Restrictions/Precautions  Restrictions/Precautions: Weight Bearing, General Precautions, Fall Risk  Required Braces or Orthoses?: No  Lower Extremity Weight Bearing Restrictions  Right Lower Extremity Weight Bearing: Toe Touch Weight Bearing  Position LYNDSAY  Patient Goals   Patient goals : \"To be able to go home as soon as I'm able. \"    Plan    Plan  Times per week:  Once daily 7x/week  Times per day: Daily  Specific instructions for Next Treatment: Progress ther ex and mobility as tolerated  Current Treatment Recommendations: Strengthening, Balance Training, Endurance Training, Functional Mobility Training, Transfer Training, Gait Training, ROM, Home Exercise Program, Safety Education & Training, Patient/Caregiver Education & Training, Equipment Evaluation, Education, & procurement, Positioning  Safety Devices  Type of devices: Left in bed  Restraints  Initially in place: No     Therapy Time   Individual Concurrent Group Co-treatment   Time In 0730         Time Out 0800         Minutes [address], [place] [phone]

## 2020-12-06 NOTE — PROGRESS NOTES
Department of Orthopedic Surgery  Physician Assistant   Progress Note    Subjective:       Systemic or Specific Complaints:  No complaints regarding hip. Still having hypotensive episodes. Objective:     Patient Vitals for the past 24 hrs:   BP Temp Temp src Pulse Resp SpO2   12/06/20 0624 125/66 98.1 °F (36.7 °C) Oral 91 16 98 %   12/05/20 2350 135/69 97.5 °F (36.4 °C) Oral 89 14 97 %   12/05/20 2045 (!) 93/57 -- -- -- -- --   12/05/20 2021 (!) 90/47 98.2 °F (36.8 °C) Oral 103 16 --   12/05/20 2020 (!) 81/35 98.2 °F (36.8 °C) Oral 93 16 97 %   12/05/20 1510 (!) 82/48 98.4 °F (36.9 °C) Oral 112 -- 97 %   12/05/20 0859 (!) 150/68 -- -- -- -- --   12/05/20 0852 (!) 94/48 -- -- -- -- --   12/05/20 0850 (!) 90/53 -- -- -- -- --   12/05/20 0844 (!) 143/59 -- -- -- -- --   12/05/20 0810 105/63 97.5 °F (36.4 °C) Oral 112 18 100 %       General: Sleeping in bed   Wound: Wound clean and dry no evidence of infection. Motion: Not tested   DVT Exam: No evidence of DVT seen on physical exam.     Additional exam:   NVI to right LE  Ice to thigh  Thigh soft but swollen. Moving toes and ankle without difficulty. Markos hutchison in place    Data Review  CBC:   Lab Results   Component Value Date    WBC 9.7 12/05/2020    RBC 2.59 12/05/2020    HGB 7.2 12/05/2020    HCT 21.5 12/05/2020     12/05/2020       Renal:   Lab Results   Component Value Date     12/05/2020    K 4.0 12/05/2020    K 5.0 11/30/2020     12/05/2020    CO2 26 12/05/2020    BUN 31 12/05/2020    CREATININE 1.2 12/05/2020    GLUCOSE 145 12/05/2020    CALCIUM 8.6 12/05/2020            Assessment:      right hip IM nailing, POD #6. Plan:      1:  Continue current plan of care per IM.    2:  Continue Deep venous thrombosis prophylaxis  3:  Continue Pain Control PRN  4:  PT and OT, TTWB on right LE  5:  Ice to thigh. Markos hutchison ordered  6:  D/c planning pending course  7:  Okay for d/c from ortho standpoint when arranged.   Follow up with  Mandie Estrada in 2 weeks, call above number for appointment.

## 2020-12-06 NOTE — CARE COORDINATION
Patient will be receiving blood today and not discharging to [facility] today. CM spoke with Maggie Camacho from [facility] who states that a rapid COVID the day of discharge will be acceptable for this patient.

## 2020-12-06 NOTE — PROGRESS NOTES
Hospitalist Progress Note      PCP: Rachel Rios MD    Date of Admission: 11/29/2020    Chief Complaint:  Right hip pain     Hospital Course:       Subjective:  pain controlled, bp remained low overnight, prbc ordered today       Medications:  Reviewed    Infusion Medications   Scheduled Medications    sodium chloride  20 mL Intravenous Once    sodium chloride flush  10 mL Intravenous 2 times per day    sennosides-docusate sodium  1 tablet Oral BID    aspirin  325 mg Oral Daily    [Held by provider] atenolol  25 mg Oral Daily    clopidogrel  75 mg Oral Daily    [Held by provider] lisinopril  2.5 mg Oral Daily    simvastatin  20 mg Oral Nightly     PRN Meds: cyclobenzaprine, sodium chloride flush, magnesium hydroxide, sodium chloride flush, acetaminophen **OR** acetaminophen, polyethylene glycol, promethazine **OR** ondansetron, traMADol **OR** traMADol      Intake/Output Summary (Last 24 hours) at 12/6/2020 0821  Last data filed at 12/6/2020 1351  Gross per 24 hour   Intake 240 ml   Output 825 ml   Net -585 ml       Physical Exam Performed:    BP (!) 173/77   Pulse 94   Temp 98.1 °F (36.7 °C) (Oral)   Resp 16   Ht 5' 11\" (1.803 m)   Wt 175 lb (79.4 kg)   SpO2 94%   BMI 24.41 kg/m²     General appearance: No apparent distress, appears stated age and cooperative. HEENT: Pupils equal, round, and reactive to light. Conjunctivae/corneas clear. Neck: Supple, with full range of motion. No jugular venous distention. Trachea midline. Respiratory:  Normal respiratory effort. Clear to auscultation, bilaterally without Rales/Wheezes/Rhonchi. Cardiovascular: Regular rate and rhythm with normal S1/S2 without murmurs, rubs or gallops. Abdomen: Soft, non-tender, non-distended with normal bowel sounds.   Musculoskeletal: No clubbing, cyanosis or edema bilaterally.  Full range of motion without deformity except right hip dressing c/d/i  Skin: Skin color, texture, turgor normal.  No rashes or lesions. Neurologic:  Neurovascularly intact without any focal sensory/motor deficits. Cranial nerves: II-XII intact, grossly non-focal.  Psychiatric: Alert and oriented, thought content appropriate, normal insight  Capillary Refill: Brisk,< 3 seconds   Peripheral Pulses: +2 palpable, equal bilaterally          Labs:   Recent Labs     12/04/20  0747 12/04/20  1528 12/05/20  0932 12/05/20  2148   WBC 8.9  --  9.7  --    HGB 7.5* 7.4* 8.4* 7.2*   HCT 22.0* 21.7* 25.0* 21.5*     --  318  --      Recent Labs     12/04/20  0747 12/05/20  0932    139   K 4.0 4.0    104   CO2 26 26   BUN 31* 31*   CREATININE 1.2 1.2   CALCIUM 8.3 8.6     No results for input(s): AST, ALT, BILIDIR, BILITOT, ALKPHOS in the last 72 hours. No results for input(s): INR in the last 72 hours. No results for input(s): Verlena Neri in the last 72 hours. Urinalysis:      Lab Results   Component Value Date    NITRU Negative 11/29/2020    WBCUA 0-2 11/29/2020    BACTERIA Rare 11/29/2020    RBCUA 11-20 11/29/2020    BLOODU MODERATE 11/29/2020    SPECGRAV >=1.030 11/29/2020    GLUCOSEU Negative 11/29/2020       Radiology:  XR FEMUR RIGHT (MIN 2 VIEWS)   Final Result   Successful internal reduction of proximal left femur fracture. Please refer   to procedure notes for additional details. CT Head WO Contrast   Final Result   No acute intracranial abnormality. CT Cervical Spine WO Contrast   Final Result   No acute abnormality of the cervical spine. XR CHEST PORTABLE   Final Result   Left 4th rib fracture. XR HIP 2-3 VW W PELVIS RIGHT   Final Result   1. Acute comminuted intertrochanteric fracture of the right femur with   subtrochanteric extension, overriding, displacement, and angulation. 2. Bony demineralization.          FLUORO FOR SURGICAL PROCEDURES    (Results Pending)           Assessment/Plan:    Active Hospital Problems    Diagnosis    Closed right hip fracture ([facility] [address] [S72.001A]    JENISE (acute kidney injury) ([facility] [address] [N17.9]    Closed right hip fracture, initial encounter ([facility] [address] [S72.001A]    HTN (hypertension) [I10]    Hyperlipidemia [E78.5]     pain in setting of right hip fracture d/t mechanical fall  -X-ray right pelvis revealed acute comminuted intertrochanteric fracture of the right femur with subtrochanteric extension, overriding, displacement and angulation  -CT cervical spine revealed no acute abnormality of cervical spine  -CT head revealed no acute intracranial abnormality  -Chest x-ray revealed a left fourth rib fracture  -morphine given in ED  -orthopedic surgery consulted,s/p surgery 11/30  -PRN pain medication  -Based on the evaluation on 11/29/2020, and diagnostic testing including EKG, there are no apparent cardiac contraindications to the proposed procedure.  All questions/concerns that could be addressed were addressed. Encourage use of incentive spirometry although patient is at low risk for perioperative cardiopulmonary complication. Brody De León estimated risk probability for perioperative MI or cardiac arrest is 0.47%.   The patient appears to be an appropriate candidate for surgery if ortho deemed necessary.     Postop Anemia on top of chronic normocytic anemia- due to surgery, 11.8/34.7 on admission, no s/s of bleeding  -monitored h/h  -transfused given symptomatic, 2u on 12/6     JENISE, CR 1.8 on admission, resolved as of 12/3  -likely 2/2 poor po intake  -1 L NS given in ED  -monitored with IVF  -bmp monitored   -held acei     Essential HTN- with periods of orthostatic hypotension  -on atenolol and lisinopril, held for low bp  -continued asa and plavix     HLD  -continued simvastatin          DVT Prophylaxis: on asa/plavix  Diet: DIET GENERAL;  Dietary Nutrition Supplements: Standard High Calorie Oral Supplement  Code Status: Full Code    PT/OT Eval Status: rec snf    Dispo - 2u prbc today, tomorrow if bp stable    Dajuan Morales MD

## 2020-12-07 LAB
ANION GAP SERPL CALCULATED.3IONS-SCNC: 8 MMOL/L (ref 3–16)
BUN BLDV-MCNC: 28 MG/DL (ref 7–20)
CALCIUM SERPL-MCNC: 8.5 MG/DL (ref 8.3–10.6)
CHLORIDE BLD-SCNC: 104 MMOL/L (ref 99–110)
CO2: 23 MMOL/L (ref 21–32)
CREAT SERPL-MCNC: 1 MG/DL (ref 0.8–1.3)
GFR AFRICAN AMERICAN: >60
GFR NON-AFRICAN AMERICAN: >60
GLUCOSE BLD-MCNC: 122 MG/DL (ref 70–99)
HCT VFR BLD CALC: 31.1 % (ref 40.5–52.5)
HEMOGLOBIN: 10.5 G/DL (ref 13.5–17.5)
MCH RBC QN AUTO: 32.2 PG (ref 26–34)
MCHC RBC AUTO-ENTMCNC: 33.7 G/DL (ref 31–36)
MCV RBC AUTO: 95.5 FL (ref 80–100)
PDW BLD-RTO: 13 % (ref 12.4–15.4)
PLATELET # BLD: 316 K/UL (ref 135–450)
PMV BLD AUTO: 8.1 FL (ref 5–10.5)
POTASSIUM SERPL-SCNC: 4.2 MMOL/L (ref 3.5–5.1)
RBC # BLD: 3.26 M/UL (ref 4.2–5.9)
SODIUM BLD-SCNC: 135 MMOL/L (ref 136–145)
WBC # BLD: 10.2 K/UL (ref 4–11)

## 2020-12-07 PROCEDURE — 80048 BASIC METABOLIC PNL TOTAL CA: CPT

## 2020-12-07 PROCEDURE — 2580000003 HC RX 258: Performed by: ORTHOPAEDIC SURGERY

## 2020-12-07 PROCEDURE — 1200000000 HC SEMI PRIVATE

## 2020-12-07 PROCEDURE — 6370000000 HC RX 637 (ALT 250 FOR IP): Performed by: ORTHOPAEDIC SURGERY

## 2020-12-07 PROCEDURE — 97530 THERAPEUTIC ACTIVITIES: CPT

## 2020-12-07 PROCEDURE — 97110 THERAPEUTIC EXERCISES: CPT

## 2020-12-07 PROCEDURE — 36415 COLL VENOUS BLD VENIPUNCTURE: CPT

## 2020-12-07 PROCEDURE — 2580000003 HC RX 258: Performed by: INTERNAL MEDICINE

## 2020-12-07 PROCEDURE — 85027 COMPLETE CBC AUTOMATED: CPT

## 2020-12-07 PROCEDURE — 97535 SELF CARE MNGMENT TRAINING: CPT

## 2020-12-07 RX ORDER — 0.9 % SODIUM CHLORIDE 0.9 %
1000 INTRAVENOUS SOLUTION INTRAVENOUS ONCE
Status: COMPLETED | OUTPATIENT
Start: 2020-12-07 | End: 2020-12-07

## 2020-12-07 RX ADMIN — SODIUM CHLORIDE 1000 ML: 9 INJECTION, SOLUTION INTRAVENOUS at 15:24

## 2020-12-07 RX ADMIN — CLOPIDOGREL BISULFATE 75 MG: 75 TABLET ORAL at 08:31

## 2020-12-07 RX ADMIN — DOCUSATE SODIUM 50MG AND SENNOSIDES 8.6MG 1 TABLET: 8.6; 5 TABLET, FILM COATED ORAL at 21:05

## 2020-12-07 RX ADMIN — SODIUM CHLORIDE, PRESERVATIVE FREE 10 ML: 5 INJECTION INTRAVENOUS at 08:31

## 2020-12-07 RX ADMIN — SODIUM CHLORIDE, PRESERVATIVE FREE 10 ML: 5 INJECTION INTRAVENOUS at 21:04

## 2020-12-07 RX ADMIN — SIMVASTATIN 20 MG: 10 TABLET, FILM COATED ORAL at 21:05

## 2020-12-07 RX ADMIN — ASPIRIN 325 MG: 325 TABLET, COATED ORAL at 08:31

## 2020-12-07 RX ADMIN — DOCUSATE SODIUM 50MG AND SENNOSIDES 8.6MG 1 TABLET: 8.6; 5 TABLET, FILM COATED ORAL at 08:31

## 2020-12-07 ASSESSMENT — PAIN DESCRIPTION - LOCATION
LOCATION: HIP
LOCATION: HIP

## 2020-12-07 ASSESSMENT — PAIN SCALES - WONG BAKER
WONGBAKER_NUMERICALRESPONSE: 6
WONGBAKER_NUMERICALRESPONSE: 6

## 2020-12-07 ASSESSMENT — PAIN DESCRIPTION - PAIN TYPE
TYPE: SURGICAL PAIN
TYPE: SURGICAL PAIN

## 2020-12-07 ASSESSMENT — PAIN DESCRIPTION - DESCRIPTORS: DESCRIPTORS: SORE

## 2020-12-07 ASSESSMENT — PAIN SCALES - GENERAL
PAINLEVEL_OUTOF10: 0
PAINLEVEL_OUTOF10: 0

## 2020-12-07 ASSESSMENT — PAIN DESCRIPTION - ORIENTATION
ORIENTATION: RIGHT
ORIENTATION: RIGHT

## 2020-12-07 ASSESSMENT — PAIN DESCRIPTION - FREQUENCY: FREQUENCY: INTERMITTENT

## 2020-12-07 NOTE — PROGRESS NOTES
Hospitalist Progress Note      PCP: Sharyle Drew, MD    Date of Admission: 11/29/2020    Chief Complaint:  Right hip pain     Hospital Course:       Subjective:  pain controlled, bp better but dropped again significantly      Medications:  Reviewed    Infusion Medications   Scheduled Medications    sodium chloride flush  10 mL Intravenous 2 times per day    sennosides-docusate sodium  1 tablet Oral BID    aspirin  325 mg Oral Daily    [Held by provider] atenolol  25 mg Oral Daily    clopidogrel  75 mg Oral Daily    [Held by provider] lisinopril  2.5 mg Oral Daily    simvastatin  20 mg Oral Nightly     PRN Meds: cyclobenzaprine, sodium chloride flush, magnesium hydroxide, sodium chloride flush, acetaminophen **OR** acetaminophen, polyethylene glycol, promethazine **OR** ondansetron, traMADol **OR** traMADol      Intake/Output Summary (Last 24 hours) at 12/7/2020 1204  Last data filed at 12/7/2020 1015  Gross per 24 hour   Intake 795 ml   Output 1250 ml   Net -455 ml       Physical Exam Performed:    /79   Pulse 92   Temp 98.1 °F (36.7 °C) (Oral)   Resp 16   Ht 5' 11\" (1.803 m)   Wt 175 lb (79.4 kg)   SpO2 97%   BMI 24.41 kg/m²     General appearance: No apparent distress, appears stated age and cooperative. HEENT: Pupils equal, round, and reactive to light. Conjunctivae/corneas clear. Neck: Supple, with full range of motion. No jugular venous distention. Trachea midline. Respiratory:  Normal respiratory effort. Clear to auscultation, bilaterally without Rales/Wheezes/Rhonchi. Cardiovascular: Regular rate and rhythm with normal S1/S2 without murmurs, rubs or gallops. Abdomen: Soft, non-tender, non-distended with normal bowel sounds. Musculoskeletal: No clubbing, cyanosis or edema bilaterally.  Full range of motion without deformity except right hip dressing c/d/i  Skin: Skin color, texture, turgor normal.  No rashes or lesions.   Neurologic:  Neurovascularly intact without any focal sensory/motor deficits. Cranial nerves: II-XII intact, grossly non-focal.  Psychiatric: Alert and oriented, thought content appropriate, normal insight  Capillary Refill: Brisk,< 3 seconds   Peripheral Pulses: +2 palpable, equal bilaterally     Labs:   Recent Labs     12/05/20  0932  12/06/20  0950 12/06/20 1940 12/07/20  0549   WBC 9.7  --  8.7  --  10.2   HGB 8.4*   < > 7.8* 10.1* 10.5*   HCT 25.0*   < > 22.8* 30.3* 31.1*     --  312  --  316    < > = values in this interval not displayed. Recent Labs     12/05/20  0932 12/06/20  0950 12/07/20  0549    138 135*   K 4.0 4.6 4.2    104 104   CO2 26 27 23   BUN 31* 28* 28*   CREATININE 1.2 1.2 1.0   CALCIUM 8.6 8.3 8.5     No results for input(s): AST, ALT, BILIDIR, BILITOT, ALKPHOS in the last 72 hours. No results for input(s): INR in the last 72 hours. No results for input(s): Donzoran Keens in the last 72 hours. Urinalysis:      Lab Results   Component Value Date    NITRU Negative 11/29/2020    WBCUA 0-2 11/29/2020    BACTERIA Rare 11/29/2020    RBCUA 11-20 11/29/2020    BLOODU MODERATE 11/29/2020    SPECGRAV >=1.030 11/29/2020    GLUCOSEU Negative 11/29/2020       Radiology:  XR FEMUR RIGHT (MIN 2 VIEWS)   Final Result   Successful internal reduction of proximal left femur fracture. Please refer   to procedure notes for additional details. CT Head WO Contrast   Final Result   No acute intracranial abnormality. CT Cervical Spine WO Contrast   Final Result   No acute abnormality of the cervical spine. XR CHEST PORTABLE   Final Result   Left 4th rib fracture. XR HIP 2-3 VW W PELVIS RIGHT   Final Result   1. Acute comminuted intertrochanteric fracture of the right femur with   subtrochanteric extension, overriding, displacement, and angulation. 2. Bony demineralization.          FLUORO FOR SURGICAL PROCEDURES    (Results Pending)           Assessment/Plan:    Active Hospital Problems Diagnosis    [facility] [S72.001A]    JENISE [facility]acute [facility]) [facility] [N17.9]    Closed right hip fracture, initial encounter [facility] Whitley Trinh    HTN (hypertension) [I10]    Hyperlipidemia [E78.5]     pain in setting of right hip fracture d/t mechanical fall  -X-ray right pelvis revealed acute comminuted intertrochanteric fracture of the right femur with subtrochanteric extension, overriding, displacement and angulation  -CT cervical spine revealed no acute abnormality of cervical spine  -CT head revealed no acute intracranial abnormality  -Chest x-ray revealed a left fourth rib fracture  -morphine given in ED  -orthopedic surgery consulted,s/p surgery 11/30  -PRN pain medication  -Based on the evaluation on 11/29/2020, and diagnostic testing including EKG, there are no apparent cardiac contraindications to the proposed procedure.  All questions/concerns that could be addressed were addressed. Encourage use of incentive spirometry although patient is at low risk for perioperative cardiopulmonary complication. Cranston Najjar estimated risk probability for perioperative MI or cardiac arrest is 0.47%.   The patient appears to be an appropriate candidate for surgery if ortho deemed necessary.     Postop Anemia on top of chronic normocytic anemia- due to surgery, 11.8/34.7 on admission, no s/s of bleeding  -monitored h/h  -transfused given symptomatic, 2u on 12/6     JENISE, CR 1.8 on admission, resolved as of 12/3  -likely 2/2 poor po intake  -1 L NS given in ED  -monitored with IVF  -bmp monitored   -held acei     Essential HTN- with periods of orthostatic hypotension  -on atenolol and lisinopril, held for low bp  -continued asa and plavix   -cards consulted given lack of improvement with blood/ivf    HLD  -continued simvastatin       DVT Prophylaxis: on asa/plavix  Diet: DIET GENERAL;  Dietary Nutrition Supplements: Standard High Calorie Oral Supplement  Code Status: Full Code    PT/OT Eval Status: snf    Dispo - cards consulted,     Tj Cid MD

## 2020-12-07 NOTE — PROGRESS NOTES
Physical Therapy  Facility/Department: [facility] C5 - MED SURG/ORTHO  Daily Treatment Note  NAME: Jem Lee  : 1929  MRN: [id number]    Date of Service: 2020    Discharge Recommendations:  Subacute/Skilled Nursing Facility   PT Equipment Recommendations  Equipment Needed: No    Assessment   Body structures, Functions, Activity limitations: Decreased functional mobility ; Decreased strength;Decreased endurance;Decreased ROM; Decreased balance;Decreased posture; Increased pain  Assessment: Pt participated well with LE ther ex and mobility this date. Pt much better able to maintain TTWB status during sit<>stand and bed>chair transfers today than during previous sessions -- although still requires min verbal/tactile cues from therapist.  Pt still demonstrates orthostatic BP's with standing, although BP's stabilized better and more quickly today vs. therapy sessions last week. Cont. to recommend SNF at D/C given current deficits in strength and mobility. Treatment Diagnosis: Decreased ROM/strength RLE and (I) with mobility s/p R femur fx with IM nailing on 20  Specific instructions for Next Treatment: Progress ther ex and mobility as tolerated  Prognosis: Good  Decision Making: Medium Complexity  PT Education: Goals; General Safety;Gait Training;PT Role;Plan of Care;Disease Specific Education; Functional Mobility Training;Equipment;Precautions;Transfer Training;Weight-bearing Education;Home Exercise Program;Family Education  Patient Education: Disease-specific education: Pt educated on TTWB status for RLE and safety in transfers; pt verbalizes understanding. Barriers to Learning: Pain  REQUIRES PT FOLLOW UP: Yes  Activity Tolerance: Patient Tolerated treatment well; Other  Activity Tolerance: Vitals during session (complete set taken 2x during PT session - below is 2nd set of vitals (first set was very similar):     Supine:  /80,  bpm.     Seated EOB:  /66, HR = 118 bpm.     Standing: BP = 76/49, HR = 129 bpm.  Pt asymptomatic at this point in session. After bed>chair transfer, BP = 68/35 although rebounded to 116/67 after sitting in chair ~2 minutes. RN aware of orthostatic BP values. Patient Diagnosis(es): The primary encounter diagnosis was Closed fracture of right hip, initial encounter ([facility] [address] Diagnoses of JENISE (acute kidney injury) ([facility] [address] Fall, initial encounter, Hematuria, unspecified type, and Closed fracture of one rib of left side, initial encounter were also pertinent to this visit. has a past medical history of Cancer ([facility] [address] has a past surgical history that includes Femur fracture surgery (Right, 11/30/2020). Restrictions  Restrictions/Precautions  Restrictions/Precautions: Weight Bearing, General Precautions, Fall Risk  Required Braces or Orthoses?: No  Lower Extremity Weight Bearing Restrictions  Right Lower Extremity Weight Bearing: Toe Touch Weight Bearing  Position Activity Restriction  Other position/activity restrictions: \"Touch down\" weight bearing RLE, high fall risk, thigh high compression stockings  Subjective   General  Chart Reviewed: Yes  Family / Caregiver Present: Yes(granddaughter present for portion of session)  Referring Practitioner: Dr. Carmina Pizano  Subjective  Subjective: Pt agreeable to work with PT this afternoon. RN requests to be present during portion of session for orthostatic vitals. General Comment  Comments: Pt resting in bed upon entry of therapy staff  Pain Screening  Patient Currently in Pain: Yes  Pain Assessment  Pain Assessment: Faces  Armenta-Baker Pain Rating: Hurts even more  Pain Type: Surgical pain  Pain Location: Hip  Pain Orientation: Right  Pain Descriptors: Sore  Pain Frequency: Intermittent  Non-Pharmaceutical Pain Intervention(s): Ambulation/Increased Activity;Repositioned; Emotional support       Orientation  Orientation  Overall Orientation Status: Within Functional Limits    Objective   Bed mobility  Supine to Sit: Minimal assistance(to guide RLE OOB, HOB elevated ~30 degrees)  Sit to Supine: 2 Person assistance([place] x 2)  Scooting: Stand by assistance;2 Person assistance(SBA to scoot forward to EOB, modA x 2 to fully scoot pt up in bed)     Transfers  Sit to Stand: 2 Person Assistance([place] x 2, performed 5 reps during session)  Stand to sit: 2 Person Assistance([place] x 1-2)  Bed to Chair: 2 Person Assistance([place] x 2, from bed>chair>bed>chair)     Ambulation  WB Status: TTWB RLE  Surface: level tile  Device: Rolling Walker  Assistance: 2 Person assistance(S[place] x 2)  Quality of Gait: Pt able to take small steps from bed>chair (x 3 reps) using RW for support. Su[place] x 2 needed for balance with additional help/monitoring from one therapist to ensure consistent maintenance of TTWB RLE during transfers. Gait Deviations: Slow Maryjane;Decreased step length;Decreased step height  Distance: x 2-3 feet from bed>chair (performed x 3 reps during today's session -- i.e. bed>chair>bed>chair)     Balance  Posture: Good  Sitting - Static: Good  Sitting - Dynamic: Fair;+  Standing - Static: Fair  Standing - Dynamic: Fair;-(using RW)  Comments: Pt able to stand at [facility] for ~3 consecutive minutes while maintaining TTWB RLE with Su[place] x 2. Exercises  Ankle Pumps: 2 x 20 BLE  Comments: Deferred further LE ther ex this session due to focus on working on transfers/mobility and taking orthostatic vitals.      AM-PAC Score  AM-PAC Inpatient Mobility Raw Score : 13 (12/07/20 1542)  AM-PAC Inpatient T-Scale Score : 36.74 (12/07/20 1542)  Mobility Inpatient CMS 0-100% Score: 64.91 (12/07/20 1542)  Mobility Inpatient CMS G-Code Modifier : CL (12/07/20 1542)    Goals  Short term goals  Time Frame for Short term goals: 5 days, 12/06/20 (unless otherwise specified) - goal time frame extended to 12/09/20 due to longer-than-expected LOS  Short term goal 1: Pt will transfer supine <-> sit with Su[place] x 1 for RLE - MET 12/03/20  Short term goal 2: Pt will transfer sit <-> stand and bed>chair using RW with min/CGA x 1 while maintaining TTWB RLE - not yet met but progressing toward goal ([place] x 2 as of 12/07/20)  Short term goal 3: By 12/04/20: Pt will tolerate 12-15 reps RLE ther ex for ROM/strengthening - MET 12/02/20, goal ongoing  Short term goal 4: Pt will ambulate x 15 feet using RW with [place] x 1 while maintaining TTWB RLE - not yet met but progressing toward goal (amb x 2 feet from bed>chair using RW with [place] x 2 as of 12/07/20)  Patient Goals   Patient goals : \"To be able to go home as soon as I'm able. \"    Plan    Times per week: Once daily 7x/week  Times per day: Daily  Specific instructions for Next Treatment: Progress ther ex and mobility as tolerated  Current Treatment Recommendations: Strengthening, Balance Training, Endurance Training, Functional Mobility Training, Transfer Training, Gait Training, ROM, Home Exercise Program, Safety Education & Training, Patient/Caregiver Education & Training, Equipment Evaluation, Education, & procurement, Positioning  Safety Devices: All fall risk precautions in place, Left in chair, Call light within reach, Chair alarm in place, Nurse notified, Gait belt, Patient at risk for falls    Therapy Time   Individual Concurrent Group Co-treatment   Time In 1345         Time Out 1440         Minutes 55         Timed Code Treatment Minutes: [facility] [place], [place] #[id number]    If pt is unable to be seen after this session, please let this note serve as discharge summary. Please see case management note for discharge disposition. Thank you.

## 2020-12-07 NOTE — CARE COORDINATION
Writer confirms [facility] can accept patient; rapid COVID required day of dc.       LG

## 2020-12-07 NOTE — PROGRESS NOTES
Occupational Therapy  Facility/Department: [facility] C5 - MED SURG/ORTHO  Daily Treatment Note  NAME: Gretta Franks  : 1929  MRN: [id number]    Date of Service: 2020    Discharge Recommendations:  Subacute/Skilled Nursing Facility     Assessment   Performance deficits / Impairments: Decreased functional mobility ; Decreased ADL status; Decreased strength;Decreased safe awareness;Decreased cognition;Decreased endurance;Decreased balance;Decreased high-level IADLs  Assessment: Pt demos progress with functional transfers, completing with min A of 2 this session. Pt BP continues to drop however during session would stabilize with seated rest breaks. Pt continues to function below his baseline and require Ax2 for transfers, continue to recommend SNF at d/c to address the above noted occupational performance deficits. Prognosis: Good  OT Education: OT Role;Plan of Care;Precautions; ADL Adaptive Strategies;Transfer Training;Equipment  Patient Education: disease specific:  TTWB, safety with transfers  REQUIRES OT FOLLOW UP: Yes  Activity Tolerance  Activity Tolerance: Patient Tolerated treatment well  Activity Tolerance: Pt BP continues to drop with activity however with seated rest breaks returns to safe number. Pt BP in bed 170/70, at lowest point during session 79/47. Pt left in bed with PT and PCA for formal orthostatics. Safety Devices  Safety Devices in place: Yes  Type of devices: Left in bed;Call light within reach;Nurse notified;Gait belt         Patient Diagnosis(es): The primary encounter diagnosis was Closed fracture of right hip, initial encounter ([facility] [address] Diagnoses of JENISE (acute kidney injury) ([facility] [address] Fall, initial encounter, Hematuria, unspecified type, and Closed fracture of one rib of left side, initial encounter were also pertinent to this visit. has a past medical history of Cancer ([facility] [address]    has a past surgical history that includes Femur fracture surgery (Right, 11/30/2020). Restrictions  Restrictions/Precautions  Restrictions/Precautions: Weight Bearing, General Precautions, Fall Risk  Required Braces or Orthoses?: No  Lower Extremity Weight Bearing Restrictions  Right Lower Extremity Weight Bearing: Toe Touch Weight Bearing  Position Activity Restriction  Other position/activity restrictions: \"Touch down\" weight bearing RLE, high fall risk, thigh high compression stockings     Subjective   General  Chart Reviewed: Yes  Patient assessed for rehabilitation services?: Yes  Response to previous treatment: Patient with no complaints from previous session  Family / Caregiver Present: Yes(granddaughter present for part of session)  Referring Practitioner: Didi Sanchez DO 11/30/2020  Diagnosis: Closed right hip fracture 11/29/2020    Subjective  Subjective: Pt resting in bed, agreeable to OT treatment.     Pain Assessment  Pain Assessment: Faces  Armenta-Baker Pain Rating: Hurts even more  Pain Type: Surgical pain  Pain Location: Hip  Pain Orientation: Right  Non-Pharmaceutical Pain Intervention(s): Ambulation/Increased Activity;Repositioned  Pre Treatment Pain Screening  Intervention List: Patient able to continue with treatment  Vital Signs  Patient Currently in Pain: Yes     Orientation  Orientation  Overall Orientation Status: Within Functional Limits     Objective    ADL  LE Dressing: Dependent/Total(socks)  Toileting: Maximum assistance     Balance  Sitting Balance: Supervision  Standing Balance: Dependent/Total(min A of 2)  Standing Balance  Activity: bed to chair stand step transfer with RW; static stand from chair 3 mins with focus on WB    Bed mobility  Supine to Sit: Minimal assistance(to R with HOB elevated)  Sit to Supine: Dependent/Total;2 Person assistance(min A of 2)     Transfers  Sit to stand: Dependent/Total;2 Person assistance(min A of 2)  Stand to sit: Dependent/Total;2 Person assistance(min A of 2)  Transfer Comments: pt requires max cues for safety with hand placement and assist to manage RLE to maintain WB with transfers     Cognition  Overall Cognitive Status: Exceptions  Following Commands: Follows multistep commands with repitition  Attention Span: Attends with cues to redirect  Safety Judgement: Decreased awareness of need for assistance;Decreased awareness of need for safety  Problem Solving: Assistance required to identify errors made;Assistance required to correct errors made;Decreased awareness of errors  Insights: Decreased awareness of deficits  Initiation: Requires cues for some  Sequencing: Requires cues for some  Cognition Comment: frequent reminders regarding TTWB on RLE     Plan   Plan  Times per week: 3-5x/wk in acute  Current Treatment Recommendations: Safety Education & Training, Self-Care / ADL, Balance Training, Strengthening, Functional Mobility Training, Endurance Training, Pain Management    AM-PAC Score  AM-PAC Inpatient Daily Activity Raw Score: 13 (12/07/20 1532)  AM-PAC Inpatient ADL T-Scale Score : 32.03 (12/07/20 1532)  ADL Inpatient CMS 0-100% Score: 63.03 (12/07/20 1532)  ADL Inpatient CMS G-Code Modifier : CL (12/07/20 1532)    Goals  Short term goals  Time Frame for Short term goals:  One week unless otherwise specified - 12/08  Short term goal 1: Pt. will perform functional transfer w/ minAx2 -- GOAL MET, pt completed with min A of 2 12/7/20  Short term goal 2: Pt. will complete standing level grooming tasks w/ correct adherence to RLE WB status --ongoing 12/7/20  Short term goal 3: Pt. will complete 3 BUE exercises x15 in preparation for ADL tasks by 12/05 -- GOAL NOT MET, not addressed 12/7/20  Patient Goals   Patient goals : \"I want to get to the chair with only a little bit of help\"     Therapy Time   Individual Concurrent Group Co-treatment   Time In 1335         Time Out 1415         Minutes [address] [place], THORNTON/L

## 2020-12-08 LAB
ANION GAP SERPL CALCULATED.3IONS-SCNC: 8 MMOL/L (ref 3–16)
BUN BLDV-MCNC: 26 MG/DL (ref 7–20)
CALCIUM SERPL-MCNC: 8.5 MG/DL (ref 8.3–10.6)
CHLORIDE BLD-SCNC: 103 MMOL/L (ref 99–110)
CO2: 25 MMOL/L (ref 21–32)
CREAT SERPL-MCNC: 1 MG/DL (ref 0.8–1.3)
GFR AFRICAN AMERICAN: >60
GFR NON-AFRICAN AMERICAN: >60
GLUCOSE BLD-MCNC: 135 MG/DL (ref 70–99)
HCT VFR BLD CALC: 33.6 % (ref 40.5–52.5)
HEMOGLOBIN: 11.4 G/DL (ref 13.5–17.5)
MCH RBC QN AUTO: 32.3 PG (ref 26–34)
MCHC RBC AUTO-ENTMCNC: 33.9 G/DL (ref 31–36)
MCV RBC AUTO: 95.1 FL (ref 80–100)
PDW BLD-RTO: 13.1 % (ref 12.4–15.4)
PLATELET # BLD: 386 K/UL (ref 135–450)
PMV BLD AUTO: 7.6 FL (ref 5–10.5)
POTASSIUM SERPL-SCNC: 4.3 MMOL/L (ref 3.5–5.1)
RBC # BLD: 3.53 M/UL (ref 4.2–5.9)
SODIUM BLD-SCNC: 136 MMOL/L (ref 136–145)
WBC # BLD: 10.1 K/UL (ref 4–11)

## 2020-12-08 PROCEDURE — 36415 COLL VENOUS BLD VENIPUNCTURE: CPT

## 2020-12-08 PROCEDURE — 97530 THERAPEUTIC ACTIVITIES: CPT

## 2020-12-08 PROCEDURE — 97110 THERAPEUTIC EXERCISES: CPT

## 2020-12-08 PROCEDURE — 6370000000 HC RX 637 (ALT 250 FOR IP): Performed by: ORTHOPAEDIC SURGERY

## 2020-12-08 PROCEDURE — 97535 SELF CARE MNGMENT TRAINING: CPT

## 2020-12-08 PROCEDURE — 2580000003 HC RX 258: Performed by: INTERNAL MEDICINE

## 2020-12-08 PROCEDURE — 80048 BASIC METABOLIC PNL TOTAL CA: CPT

## 2020-12-08 PROCEDURE — 99223 1ST HOSP IP/OBS HIGH 75: CPT | Performed by: INTERNAL MEDICINE

## 2020-12-08 PROCEDURE — 2580000003 HC RX 258: Performed by: ORTHOPAEDIC SURGERY

## 2020-12-08 PROCEDURE — 85027 COMPLETE CBC AUTOMATED: CPT

## 2020-12-08 PROCEDURE — 1200000000 HC SEMI PRIVATE

## 2020-12-08 RX ORDER — SODIUM CHLORIDE 9 MG/ML
INJECTION, SOLUTION INTRAVENOUS CONTINUOUS
Status: ACTIVE | OUTPATIENT
Start: 2020-12-08 | End: 2020-12-09

## 2020-12-08 RX ADMIN — SIMVASTATIN 20 MG: 10 TABLET, FILM COATED ORAL at 21:15

## 2020-12-08 RX ADMIN — SODIUM CHLORIDE: 9 INJECTION, SOLUTION INTRAVENOUS at 15:23

## 2020-12-08 RX ADMIN — DOCUSATE SODIUM 50MG AND SENNOSIDES 8.6MG 1 TABLET: 8.6; 5 TABLET, FILM COATED ORAL at 21:15

## 2020-12-08 RX ADMIN — DOCUSATE SODIUM 50MG AND SENNOSIDES 8.6MG 1 TABLET: 8.6; 5 TABLET, FILM COATED ORAL at 08:21

## 2020-12-08 RX ADMIN — CLOPIDOGREL BISULFATE 75 MG: 75 TABLET ORAL at 08:21

## 2020-12-08 RX ADMIN — TRAMADOL HYDROCHLORIDE 50 MG: 50 TABLET, COATED ORAL at 12:11

## 2020-12-08 RX ADMIN — ASPIRIN 325 MG: 325 TABLET, COATED ORAL at 08:21

## 2020-12-08 RX ADMIN — SODIUM CHLORIDE, PRESERVATIVE FREE 10 ML: 5 INJECTION INTRAVENOUS at 08:21

## 2020-12-08 RX ADMIN — SODIUM CHLORIDE: 9 INJECTION, SOLUTION INTRAVENOUS at 23:12

## 2020-12-08 ASSESSMENT — PAIN DESCRIPTION - LOCATION
LOCATION: HIP
LOCATION: HIP

## 2020-12-08 ASSESSMENT — PAIN SCALES - GENERAL
PAINLEVEL_OUTOF10: 8
PAINLEVEL_OUTOF10: 8
PAINLEVEL_OUTOF10: 6

## 2020-12-08 ASSESSMENT — PAIN DESCRIPTION - PAIN TYPE
TYPE: SURGICAL PAIN
TYPE: SURGICAL PAIN

## 2020-12-08 ASSESSMENT — PAIN DESCRIPTION - ORIENTATION
ORIENTATION: RIGHT
ORIENTATION: RIGHT

## 2020-12-08 ASSESSMENT — PAIN DESCRIPTION - FREQUENCY: FREQUENCY: INTERMITTENT

## 2020-12-08 ASSESSMENT — PAIN DESCRIPTION - DESCRIPTORS: DESCRIPTORS: SORE;SPASM

## 2020-12-08 NOTE — PROGRESS NOTES
Physical Therapy  Facility/Department: [facility] C5 - MED SURG/ORTHO  Daily Treatment Note  NAME: Fabián Suazo  : 1929  MRN: [id number]    Date of Service: 2020    Discharge Recommendations:  Subacute/Skilled Nursing Facility   PT Equipment Recommendations  Equipment Needed: No    Assessment   Body structures, Functions, Activity limitations: Decreased functional mobility ; Decreased strength;Decreased endurance;Decreased ROM; Decreased balance;Decreased posture; Increased pain  Assessment: Pt participated well with LE ther ex and mobility this date. Pt much better able to maintain TTWB status during sit<>stand and bed>chair transfers today than during previous sessions -- although still requires min verbal/tactile cues from therapist.  Pt still demonstrates orthostatic BP's with standing, although BP's stabilized better and more quickly today vs. therapy sessions last week. Cont. to recommend SNF at D/C given current deficits in strength and mobility. Treatment Diagnosis: Decreased ROM/strength RLE and (I) with mobility s/p R femur fx with IM nailing on 20  Specific instructions for Next Treatment: Progress ther ex and mobility as tolerated  Prognosis: Good  Decision Making: Medium Complexity  PT Education: Goals; General Safety;Gait Training;PT Role;Plan of Care;Disease Specific Education; Functional Mobility Training;Equipment;Precautions;Transfer Training;Weight-bearing Education;Home Exercise Program;Family Education  Patient Education: Disease-specific education: Pt educated on TTWB status for RLE and safety in transfers; pt verbalizes understanding. Barriers to Learning: Pain  REQUIRES PT FOLLOW UP: Yes  Activity Tolerance: Patient Tolerated treatment well; Other  Activity Tolerance: Vitals during session:  Supine: BP = 159/65, HR = 106 bpm.  Seated EOB: BP = 93/55, HR = 123 bpm.  After sitting EOB x 5 minutes: BP = 102/68, HR = 118 bpm.  After bed>chair: BP = 82/46, HR = 123 bpm.  After sitting up in chair x 4 minutes: BP = 124/75, HR = 116 bpm.     Patient Diagnosis(es): The primary encounter diagnosis was Closed fracture of right hip, initial encounter ([facility] [address] Diagnoses of JENISE (acute kidney injury) ([facility] [address] Fall, initial encounter, Hematuria, unspecified type, and Closed fracture of one rib of left side, initial encounter were also pertinent to this visit. has a past medical history of Cancer [facility]), Coronary artery disease, CVA (cerebral vascular accident) ([facility] [address] Hyperlipidemia, Hypertension, and [facility] has a past surgical history that includes Femur fracture surgery (Right, 11/30/2020). Restrictions  Restrictions/Precautions  Restrictions/Precautions: Weight Bearing, General Precautions, Fall Risk  Required Braces or Orthoses?: No  Lower Extremity Weight Bearing Restrictions  Right Lower Extremity Weight Bearing: Toe Touch Weight Bearing  Position Activity Restriction  Other position/activity restrictions: \"Touch down\" weight bearing RLE, high fall risk, thigh high compression stockings  Subjective   General  Chart Reviewed: Yes  Response To Previous Treatment: Patient with no complaints from previous session. Family / Caregiver Present: No  Referring Practitioner: Dr. Jason Gamez  Comments: Pt resting in bed upon entry of therapy staff  Pain Screening  Patient Currently in Pain: Yes  Pain Assessment  Pain Assessment: 0-10  Pain Level: 8  Pain Type: Surgical pain  Pain Location: Hip  Pain Orientation: Right  Pain Descriptors: Sore;Spasm  Pain Frequency: Intermittent(increased with EOB activity)  Non-Pharmaceutical Pain Intervention(s): Ambulation/Increased Activity;Repositioned; Emotional support;Distraction       Orientation  Orientation  Overall Orientation Status: Within Functional Limits     Objective   Bed mobility  Supine to Sit: Contact guard assistance(to guide RLE OOB, moving toward R side, HOB elevated ~30 degrees)  Scooting: Stand by assistance(to scoot forward to EOB)     Transfers  Sit to Stand: 2 Person Assistance([place] x 2 from EOB to RW (performed 2 reps))  Stand to sit: 2 Person Assistance([place] x 2)  Bed to Chair: 2 Person Assistance([place] x 2 using RW, moving toward R side, min cues needed to maintain TTWB and for proper technique)     Ambulation  WB Status: TTWB RLE  Surface: level tile  Device: Rolling Walker  Assistance: 2 Person assistance(Suy[place] x 2)  Quality of Gait: Pt able to take small steps from bed>chair using RW for support. Suyapa x 2 needed for balance with additional help/monitoring from one therapist to ensure consistent maintenance of TTWB RLE during transfers.   Gait Deviations: Slow Maryjane;Decreased step length;Decreased step height  Distance: x 2-3 feet from bed>chair    Balance  Posture: Good  Sitting - Static: Good  Sitting - Dynamic: Fair;+  Standing - Static: Fair  Standing - Dynamic: Fair;-(using RW)     Exercises  Quad Sets: x 20 BLE  Heelslides: x 20 RLE with CGA  Gluteal Sets: x 20 bilat  Ankle Pumps: x 20 BLE     AM-PAC Score  AM-PAC Inpatient Mobility Raw Score : 13 (12/08/20 1058)  AM-PAC Inpatient T-Scale Score : 36.74 (12/08/20 1058)  Mobility Inpatient CMS 0-100% Score: 64.91 (12/08/20 1058)  Mobility Inpatient CMS G-Code Modifier : CL (12/08/20 1058)    Goals  Short term goals  Time Frame for Short term goals: 5 days, 12/06/20 (unless otherwise specified) - goal time frame extended to 12/09/20 due to longer-than-expected LOS  Short term goal 1: Pt will transfer supine <-> sit with Suyapa x 1 for RLE - MET 12/03/20  Short term goal 2: Pt will transfer sit <-> stand and bed>chair using RW with min/CGA x 1 while maintaining TTWB RLE - not yet met but progressing toward goal (Suy[place] x 2 as of 12/08/20)  Short term goal 3: By 12/04/20: Pt will tolerate 12-15 reps RLE ther ex for ROM/strengthening - MET 12/02/20, goal ongoing  Short term goal 4: Pt will ambulate x 15 feet using RW with Suyapa x 1 while maintaining TTWB RLE - not yet met but progressing toward goal (amb x 2-3 feet from bed>chair using RW with Suyapa x 2 as of 12/08/20)  Patient Goals   Patient goals : \"To be able to go home as soon as I'm able. \"    Plan    Times per week: Once daily 7x/week  Times per day: Daily  Specific instructions for Next Treatment: Progress ther ex and mobility as tolerated  Current Treatment Recommendations: Strengthening, Balance Training, Endurance Training, Functional Mobility Training, Transfer Training, Gait Training, ROM, Home Exercise Program, Safety Education & Training, Patient/Caregiver Education & Training, Equipment Evaluation, Education, & procurement, Positioning  Safety Devices: All fall risk precautions in place, Left in chair, Call light within reach, Chair alarm in place, Nurse notified, Gait belt, Patient at risk for falls    Therapy Time   Individual Concurrent Group Co-treatment   Time In 1033         Time Out 1112         Minutes 39         Timed Code Treatment Minutes: [address] #[id number]    If pt is unable to be seen after this session, please let this note serve as discharge summary. Please see case management note for discharge disposition. Thank you.

## 2020-12-08 NOTE — CONSULTS
with chopping wood most days of the week and doing his usual activities of daily living. He has mild fatigue for which he rests while chopping wood but is able to go on thereafter. No limiting dyspnea with his activity. He denies history of palpitations. Notes his blood pressures have been normal at his physicians visits, most recently in May at his cardiologist's visit. He notes he takes his blood pressure sparingly at home and always is running normal to high. He is never had this issue before of drops in blood pressure was standing. He does note that he has poor appetite while here in the hospital.  He does not like the food. He does not drink much fluid at home either. He did appear dehydrated on admission. He does not currently feel like he is dehydrated necessarily. In discussion with his nurse, he is not necessarily been symptomatic with orthostasis during this hospitalization. He is able to complete work with physical therapy including getting up to the chair today which is progress per his nurse. He endorses no dizziness, lightheadedness or presyncope upon working with physical therapy the last few days. Denies paroxysmal nocturnal dyspnea, orthopnea, bendopnea, increasing lower extremity edema or weight gain. Of note, the patient has received prior chemotherapy for history of bladder cancer in 2018. The details of his chemotherapeutic treatment are not known to me. The patient cannot speak to the details of such treatment other than noting he had 32 separate treatments and did have multiple chemotherapeutic agents. He was tolerating home dose atenolol and low-dose lisinopril prior to admission without incident. These are presently on hold given his orthostasis. Past Medical History:   [facility] Coronary artery disease, CVA (cerebral vascular accident) ([facility] [address] Hyperlipidemia, Hypertension, and Prostate cancer ([facility] [address]     Surgical History:   has a past surgical history that includes Femur fracture surgery (Right, 11/30/2020). Social History:   reports that he has never smoked. He has never used smokeless tobacco. He reports that he does not drink alcohol or use drugs. Family History:   Diabetes Mother    Cancer Mother   breast    Heart Disease Father            Home Medications:  Were reviewed and are listed in nursing record and/or below  Prior to Admission medications    Medication Sig Start Date End Date Taking?  Authorizing Provider   cyclobenzaprine (FLEXERIL) 10 MG tablet Take 1 tablet by mouth 3 times daily as needed for Muscle spasms 12/3/20 12/13/20 Yes S[place]   aspirin 325 MG EC tablet Take 325 mg by mouth daily    Historical Provider, MD   clopidogrel (PLAVIX) 75 MG tablet Take 75 mg by mouth daily    Historical Provider, MD   lisinopril (PRINIVIL;ZESTRIL) 2.5 MG tablet Take 2.5 mg by mouth daily    Historical Provider, MD   atenolol (TENORMIN) 25 MG tablet Take 25 mg by mouth daily    Historical Provider, MD   simvastatin (ZOCOR) 20 MG tablet Take 20 mg by mouth nightly    Historical Provider, MD        CURRENT Medications:  0.9 % sodium chloride infusion, Continuous  cyclobenzaprine (FLEXERIL) tablet 10 mg, TID PRN  sodium chloride flush 0.9 % injection 10 mL, 2 times per day  sodium chloride flush 0.9 % injection 10 mL, PRN  sennosides-docusate sodium (SENOKOT-S) 8.6-50 MG tablet 1 tablet, BID  magnesium hydroxide (MILK OF MAGNESIA) 400 MG/5ML suspension 30 mL, Daily PRN  aspirin EC tablet 325 mg, Daily  [Held by provider] atenolol (TENORMIN) tablet 25 mg, Daily  clopidogrel (PLAVIX) tablet 75 mg, Daily  [Held by provider] lisinopril (PRINIVIL;ZESTRIL) tablet 2.5 mg, Daily  simvastatin (ZOCOR) tablet 20 mg, Nightly  sodium chloride flush 0.9 % injection 10 mL, PRN  acetaminophen (TYLENOL) tablet 650 mg, Q6H PRN    Or  acetaminophen (TYLENOL) suppository 650 mg, Q6H PRN  polyethylene glycol (GLYCOLAX) packet 17 g, Daily PRN  promethazine (PHENERGAN) tablet 12.5 mg, Q6H PRN    Or  ondansetron (ZOFRAN) injection 4 mg, Q6H PRN  traMADol (ULTRAM) tablet 50 mg, Q6H PRN    Or  traMADol (ULTRAM) tablet 100 mg, Q6H PRN        Allergies:  Patient has no known allergies. Review of Systems:   A 14 point review of symptoms completed. Pertinent positives identified in the HPI, all other review of symptoms negative as below. Objective:     Vitals:    12/07/20 1425 12/07/20 2057 12/07/20 2318 12/08/20 0816   BP: 116/67 135/62 111/66 (!) 144/68   Pulse:  98 92 103   Resp: 20 19 20 18   Temp: 97.3 °F (36.3 °C) 98.4 °F (36.9 °C) 97.6 °F (36.4 °C) 98 °F (36.7 °C)   TempSrc: Axillary Axillary Oral Oral   SpO2: 99% 99% 98% 97%   Weight:       Height:          Weight: 175 lb (79.4 kg)       PHYSICAL EXAM:    General:  Alert, cooperative, no distress, appears younger than stated age   Head:  Normocephalic, atraumatic   Eyes:  Conjunctiva/corneas clear, anicteric sclerae    Nose: Nares normal, no drainage or sinus tenderness   Throat: No abnormalities of the lips, oral mucosa or tongue. Neck: Trachea midline. Neck supple with no lymphadenopathy, thyroid not enlarged, symmetric, no tenderness/mass/nodules, flat neck veins   Lungs:   Clear to auscultation bilaterally, no wheezes, no rales, no respiratory distress   Chest Wall:  No deformity or tenderness to palpation   Heart:  Regular rate and rhythm, distant S1/S2, no murmur, no rub, no S3/S4, PMI non-palpable. Abdomen:   Soft, non-tender, with normoactive bowel sounds. No masses, no hepatosplenomegaly   Extremities: No cyanosis, clubbing or pitting edema. Vascular: 2+ radial, brachial, femoral, dorsalis pedis and posterior tibial pulses bilaterally. Brisk carotid upstrokes without carotid bruit. Skin: Skin color, texture, turgor are normal with no rashes or ulceration. Pysch: Euthymic mood, appropriate affect   Neurologic: Oriented to person, place and time.  No slurred speech or facial asymmetry. No motor or sensory deficits on gross examination. Labs:   CBC:   Lab Results   Component Value Date    WBC 10.1 12/08/2020    RBC 3.53 12/08/2020    HGB 11.4 12/08/2020    HCT 33.6 12/08/2020    MCV 95.1 12/08/2020    RDW 13.1 12/08/2020     12/08/2020     CMP:  Lab Results   Component Value Date     12/08/2020    K 4.3 12/08/2020    K 5.0 11/30/2020     12/08/2020    CO2 25 12/08/2020    BUN 26 12/08/2020    CREATININE 1.0 12/08/2020    GFRAA >60 12/08/2020    AGRATIO 1.5 11/29/2020    LABGLOM >60 12/08/2020    GLUCOSE 135 12/08/2020    PROT 6.8 11/29/2020    CALCIUM 8.5 12/08/2020    BILITOT 0.6 11/29/2020    ALKPHOS 96 11/29/2020    AST 23 11/29/2020    ALT 13 11/29/2020     PT/INR:  No results found for: PTINR  HgBA1c:No results found for: LABA1C  Lab Results   Component Value Date    TROPONINI <0.01 03/30/2018         Cardiac Data:     EKG: Personally reviewed, notable for normal sinus rhythm with 1st degree AVB, non-specific T wave changes. The patient is not presently have any telemetry for review. Echo: None. Stress Test: None. Cardiac catheterization:     [facility] 2007  Coronary angiography summary: Two vessel disease in a right dominant system.      The LMCA is normal.        The LAD gives rise to two diagonals and a septal arcade and has minor irregularities and a     50-60% mid portion narrowing.       The circumflex gives rise to two marginals and has minor irregularities and no significant     stenoses.       The RCA is a dominant vessel with an 80-90% proximal lesion and long serial 95-99% mid and     distal lesions. There is sluggish flow.       Left ventriculography in the SANCHEZ projection utilizing 20 cc's of non-ionic contrast     demonstrates mild inferior hypokinesis. LVEF is approximately 50%. There is no mitral     insufficiency visualized.      Hemodynamics:  The aortic pressure is 170/90. LV is 170/11.  There is no aortic gradient on   hypotension/autonomic dysfunction possibly due to prior chemotherapy. Obtaining records detailing his prior cancer treatment would be beneficial to assess risk for autonomic dysfunction owing to such treatment. Ultimately if conservative measures fail we can consider midodrine therapy but this would not be our first choice. We will continue to follow. Patient Active Problem List   Diagnosis    HTN (hypertension)    Hyperlipidemia    Syncope and collapse    Closed [facility] hip fracture ([facility] [address]    [facility]    Closed right hip fracture, initial encounter ([facility]         I will address the patient's cardiac risk factors and adjusted pharmacologic treatment as needed. In addition, I have reinforced the need for patient directed risk factor modification. All questions and concerns were addressed to the patient/family. Alternatives to my treatment were discussed. Thank you for allowing us to participate in the care of Shanelle Corbin. Please call me with any questions [phone].     Dave Roach MD   Cardiovascular Disease  [address]  [phone] [facility]  [phone] [address] [place] [address]  12/8/2020 12:50 PM

## 2020-12-08 NOTE — PROGRESS NOTES
Hospitalist Progress Note      PCP: Sharyle Drew, MD    Date of Admission: 11/29/2020    Chief Complaint:  Right hip pain     Hospital Course:       Subjective:  pain controlled, bp better but dropped again significantly to 82/46 from 159/65, feels less dizzy       Medications:  Reviewed    Infusion Medications   Scheduled Medications    sodium chloride flush  10 mL Intravenous 2 times per day    sennosides-docusate sodium  1 tablet Oral BID    aspirin  325 mg Oral Daily    [Held by provider] atenolol  25 mg Oral Daily    clopidogrel  75 mg Oral Daily    [Held by provider] lisinopril  2.5 mg Oral Daily    simvastatin  20 mg Oral Nightly     PRN Meds: cyclobenzaprine, sodium chloride flush, magnesium hydroxide, sodium chloride flush, acetaminophen **OR** acetaminophen, polyethylene glycol, promethazine **OR** ondansetron, traMADol **OR** traMADol      Intake/Output Summary (Last 24 hours) at 12/8/2020 1122  Last data filed at 12/8/2020 0858  Gross per 24 hour   Intake --   Output 975 ml   Net -975 ml       Physical Exam Performed:    BP (!) 144/68   Pulse 103   Temp 98 °F (36.7 °C) (Oral)   Resp 18   Ht 5' 11\" (1.803 m)   Wt 175 lb (79.4 kg)   SpO2 97%   BMI 24.41 kg/m²   General appearance: No apparent distress, appears stated age and cooperative. HEENT: Pupils equal, round, and reactive to light. Conjunctivae/corneas clear. Neck: Supple, with full range of motion. No jugular venous distention. Trachea midline. Respiratory:  Normal respiratory effort. Clear to auscultation, bilaterally without Rales/Wheezes/Rhonchi. Cardiovascular: Regular rate and rhythm with normal S1/S2 without murmurs, rubs or gallops. Abdomen: Soft, non-tender, non-distended with normal bowel sounds.   Musculoskeletal: No clubbing, cyanosis or edema bilaterally.  Full range of motion without deformity except right hip dressing c/d/i  Skin: Skin color, texture, turgor normal.  No rashes or lesions. Neurologic:  Neurovascularly intact without any focal sensory/motor deficits. Cranial nerves: II-XII intact, grossly non-focal.  Psychiatric: Alert and oriented, thought content appropriate, normal insight  Capillary Refill: Brisk,< 3 seconds   Peripheral Pulses: +2 palpable, equal bilaterally       Labs:   Recent Labs     12/06/20  0950 12/06/20  1940 12/07/20  0549 12/08/20  0718   WBC 8.7  --  10.2 10.1   HGB 7.8* 10.1* 10.5* 11.4*   HCT 22.8* 30.3* 31.1* 33.6*     --  316 386     Recent Labs     12/06/20  0950 12/07/20  0549 12/08/20  0718    135* 136   K 4.6 4.2 4.3    104 103   CO2 27 23 25   BUN 28* 28* 26*   CREATININE 1.2 1.0 1.0   CALCIUM 8.3 8.5 8.5     No results for input(s): AST, ALT, BILIDIR, BILITOT, ALKPHOS in the last 72 hours. No results for input(s): INR in the last 72 hours. No results for input(s): Camille Basques in the last 72 hours. Urinalysis:      Lab Results   Component Value Date    NITRU Negative 11/29/2020    WBCUA 0-2 11/29/2020    BACTERIA Rare 11/29/2020    RBCUA 11-20 11/29/2020    BLOODU MODERATE 11/29/2020    SPECGRAV >=1.030 11/29/2020    GLUCOSEU Negative 11/29/2020       Radiology:  XR FEMUR RIGHT (MIN 2 VIEWS)   Final Result   Successful internal reduction of proximal left femur fracture. Please refer   to procedure notes for additional details. CT Head WO Contrast   Final Result   No acute intracranial abnormality. CT Cervical Spine WO Contrast   Final Result   No acute abnormality of the cervical spine. XR CHEST PORTABLE   Final Result   Left 4th rib fracture. XR HIP 2-3 VW W PELVIS RIGHT   Final Result   1. Acute comminuted intertrochanteric fracture of the right femur with   subtrochanteric extension, overriding, displacement, and angulation. 2. Bony demineralization.          FLUORO FOR SURGICAL PROCEDURES    (Results Pending)           Assessment/Plan:    Active Hospital Problems    Diagnosis    Closed right hip fracture ([facility] [S72.001A]    JENISE (acute kidney injury) ([facility] [address] [N17.9]    [facility] [S72.001A]    HTN (hypertension) [I10]    Hyperlipidemia [E78.5]     pain in setting of right hip fracture d/t mechanical fall  -X-ray right pelvis revealed acute comminuted intertrochanteric fracture of the right femur with subtrochanteric extension, overriding, displacement and angulation  -CT cervical spine revealed no acute abnormality of cervical spine  -CT head revealed no acute intracranial abnormality  -Chest x-ray revealed a left fourth rib fracture  -morphine given in ED  -orthopedic surgery consulted,s/p surgery 11/30  -PRN pain medication  -Based on the evaluation on 11/29/2020, and diagnostic testing including EKG, there are no apparent cardiac contraindications to the proposed procedure.  All questions/concerns that could be addressed were addressed. Encourage use of incentive spirometry although patient is at low risk for perioperative cardiopulmonary complication. Tesha Sac estimated risk probability for perioperative MI or cardiac arrest is 0.47%.   The patient appears to be an appropriate candidate for surgery if ortho deemed necessary.     Postop Anemia on top of chronic normocytic anemia- due to surgery, 11.8/34.7 on admission, no s/s of bleeding  -monitored h/h  -transfused given symptomatic, 2u on 12/6     JENISE, CR 1.8 on admission, resolved as of 12/3  -likely 2/2 poor po intake  -1 L NS given in ED  -monitored with IVF  -bmp monitored   -held acei     Essential HTN- with multiple periods of orthostatic hypotension(not improving with ivfs/blood)  -on atenolol and lisinopril, was holding for low bp  -continued asa and plavix   -cards consulted given lack of improvement with blood/ivf, ?possible autonomic dysfunction     HLD  -continued simvastatin    DVT Prophylaxis: on asa/plavix  Diet: DIET GENERAL;  Dietary Nutrition Supplements: Standard High Calorie Oral Supplement  Code Status: Full Code    PT/OT Eval Status: snf    Dispo - pending cards Harish Samano MD

## 2020-12-08 NOTE — PROGRESS NOTES
Occupational Therapy   Facility/Department: [facility] C5 - MED SURG/ORTHO  Daily Treatment Note  NAME: Lucio Medina  : 1929  MRN: [id number]    Date of Service: 2020    Discharge Recommendations:  Subacute/Skilled Nursing Facility     Assessment   Performance deficits / Impairments: Decreased functional mobility ; Decreased ADL status; Decreased strength;Decreased safe awareness;Decreased cognition;Decreased endurance;Decreased balance;Decreased high-level IADLs  Assessment: Pt completed LE dressing this date, demos good ability to don over BLE feet up to knees, requires Ax2 to don over hips. Pt continues to require Ax2 for functional transfers and bed to chair transfer as well as cueing for TTWB and safe hand placement. Continue OT per POC. Prognosis: Good  OT Education: OT Role;Plan of Care;Precautions; ADL Adaptive Strategies;Transfer Training  Patient Education: disease specific:  TTWB, safety with transfers  REQUIRES OT FOLLOW UP: Yes  Activity Tolerance  Activity Tolerance: Patient Tolerated treatment well  Activity Tolerance: Vitals during session:  Supine: BP = 159/65, HR = 106 bpm.  Seated EOB: BP = 93/55, HR = 123 bpm.  After sitting EOB x 5 minutes: BP = 102/68, HR = 118 bpm.  After bed>chair: BP = 82/46, HR = 123 bpm.  After sitting up in chair x 4 minutes: BP = 124/75, HR = 116 bpm.  Safety Devices  Safety Devices in place: Yes  Type of devices: Left in chair;Chair alarm in place;Call light within reach;Nurse notified;Gait belt         Patient Diagnosis(es): The primary encounter diagnosis was Closed fracture of right hip, initial encounter ([facility] Diagnoses of JENISE (acute kidney injury) ([address] Fall, initial encounter, Hematuria, unspecified type, and Closed fracture of one rib of left side, initial encounter were also pertinent to this visit.       has a past medical history of Cancer [facility]), Coronary artery disease, CVA (cerebral vascular accident) ([facility] Hyperlipidemia, Hypertension, and Prostate cancer ([facility] has a past surgical history that includes Femur fracture surgery (Right, 11/30/2020). Restrictions  Restrictions/Precautions  Restrictions/Precautions: Weight Bearing, General Precautions, Fall Risk  Required Braces or Orthoses?: No  Lower Extremity Weight Bearing Restrictions  Right Lower Extremity Weight Bearing: Toe Touch Weight Bearing  Position Activity Restriction  Other position/activity restrictions: \"Touch down\" weight bearing RLE, high fall risk, thigh high compression stockings     Subjective   General  Chart Reviewed: Yes  Patient assessed for rehabilitation services?: Yes  Response to previous treatment: Patient with no complaints from previous session  Family / Caregiver Present: No  Referring Practitioner: Pallavi Nassar DO 11/30/2020  Diagnosis: Closed right hip fracture 11/29/2020    Subjective  Subjective: Pt resting in bed, agreeable to OT treatment.     Pain Assessment  Pain Assessment: 0-10  Pain Level: 8  Pain Type: Surgical pain  Pain Location: Hip  Pain Orientation: Right  Non-Pharmaceutical Pain Intervention(s): Ambulation/Increased Activity;Repositioned  Pre Treatment Pain Screening  Intervention List: Patient able to continue with treatment  Vital Signs  Patient Currently in Pain: Yes(with movement seated EOB)     Orientation  Orientation  Overall Orientation Status: Within Functional Limits     Objective    ADL  LE Dressing: Dependent/Total(pt able to katelynn to knees with increased time and verbal cueing, however requires Ax2 to facilitate balance and assist with donning over hips)     Balance  Sitting Balance: Supervision  Standing Balance: Dependent/Total(min A of 2 with RW)  Standing Balance  Activity: donning pants; bed to chair stand step transfer with RW    Bed mobility  Supine to Sit: Contact guard assistance(to R with HOB elevated and use of bedrail)     Transfers  Sit to stand: Dependent/Total;2 Person assistance(min A of 2)  Stand to sit: Dependent/Total;2 Person assistance(min A of 2)  Transfer Comments: cues for safe hand placement and TTWB     Cognition  Overall Cognitive Status: Exceptions  Safety Judgement: Decreased awareness of need for assistance;Decreased awareness of need for safety     Plan   Plan  Times per week: 3-5x/wk in acute  Current Treatment Recommendations: Safety Education & Training, Self-Care / ADL, Balance Training, Strengthening, Functional Mobility Training, Endurance Training, Pain Management    AM-PAC Score  AM-PAC Inpatient Daily Activity Raw Score: 13 (12/08/20 1319)  AM-PAC Inpatient ADL T-Scale Score : 32.03 (12/08/20 1319)  ADL Inpatient CMS 0-100% Score: 63.03 (12/08/20 1319)  ADL Inpatient CMS G-Code Modifier : CL (12/08/20 1319)    Goals  Short term goals  Time Frame for Short term goals:  One week unless otherwise specified - 12/08-extended d/t LOS to 12/15  Short term goal 1: Pt. will perform functional transfer w/ minAx2 -- GOAL MET, pt completed with min A of 2 12/7/20  Short term goal 2: Pt. will complete standing level grooming tasks w/ correct adherence to RLE WB status --GOAL NOT MET, not tested 12/8/20, pt became fatigued standing just to don pants over hips  Short term goal 3: Pt. will complete 3 BUE exercises x15 in preparation for ADL tasks by 12/05 -- GOAL NOT MET, not addressed 12/7/20  Patient Goals   Patient goals : \"I want to get to the chair with only a little bit of help\"       Therapy Time   Individual Concurrent Group Co-treatment   Time In 1033         Time Out 1112         [facility]

## 2020-12-08 NOTE — CARE COORDINATION
CM met with Dr. Sandra Hayes and Roel Noel RN. Pt will likely discharge tomorrow to [facility] [place], waiting for Cardiology to round on pt. Spoke to  with [facility] [place] and they are still accepting, pt needs RAPID COVID day of DC. Spoke to son Sadi with updates as well.  JESS Alanis RN

## 2020-12-09 LAB
ANION GAP SERPL CALCULATED.3IONS-SCNC: 8 MMOL/L (ref 3–16)
BUN BLDV-MCNC: 27 MG/DL (ref 7–20)
CALCIUM SERPL-MCNC: 8.5 MG/DL (ref 8.3–10.6)
CHLORIDE BLD-SCNC: 102 MMOL/L (ref 99–110)
CO2: 25 MMOL/L (ref 21–32)
CREAT SERPL-MCNC: 1 MG/DL (ref 0.8–1.3)
GFR AFRICAN AMERICAN: >60
GFR NON-AFRICAN AMERICAN: >60
GLUCOSE BLD-MCNC: 113 MG/DL (ref 70–99)
HCT VFR BLD CALC: 32.6 % (ref 40.5–52.5)
HEMOGLOBIN: 11 G/DL (ref 13.5–17.5)
MCH RBC QN AUTO: 32.3 PG (ref 26–34)
MCHC RBC AUTO-ENTMCNC: 33.8 G/DL (ref 31–36)
MCV RBC AUTO: 95.3 FL (ref 80–100)
PDW BLD-RTO: 13.1 % (ref 12.4–15.4)
PLATELET # BLD: 423 K/UL (ref 135–450)
PMV BLD AUTO: 8 FL (ref 5–10.5)
POTASSIUM SERPL-SCNC: 4.1 MMOL/L (ref 3.5–5.1)
RBC # BLD: 3.42 M/UL (ref 4.2–5.9)
SODIUM BLD-SCNC: 135 MMOL/L (ref 136–145)
WBC # BLD: 9.3 K/UL (ref 4–11)

## 2020-12-09 PROCEDURE — 85027 COMPLETE CBC AUTOMATED: CPT

## 2020-12-09 PROCEDURE — 97530 THERAPEUTIC ACTIVITIES: CPT

## 2020-12-09 PROCEDURE — 2580000003 HC RX 258: Performed by: ORTHOPAEDIC SURGERY

## 2020-12-09 PROCEDURE — 80048 BASIC METABOLIC PNL TOTAL CA: CPT

## 2020-12-09 PROCEDURE — 99233 SBSQ HOSP IP/OBS HIGH 50: CPT | Performed by: NURSE PRACTITIONER

## 2020-12-09 PROCEDURE — 1200000000 HC SEMI PRIVATE

## 2020-12-09 PROCEDURE — 97110 THERAPEUTIC EXERCISES: CPT

## 2020-12-09 PROCEDURE — 97116 GAIT TRAINING THERAPY: CPT

## 2020-12-09 PROCEDURE — 6370000000 HC RX 637 (ALT 250 FOR IP): Performed by: ORTHOPAEDIC SURGERY

## 2020-12-09 PROCEDURE — 36415 COLL VENOUS BLD VENIPUNCTURE: CPT

## 2020-12-09 PROCEDURE — 6370000000 HC RX 637 (ALT 250 FOR IP): Performed by: NURSE PRACTITIONER

## 2020-12-09 PROCEDURE — 97535 SELF CARE MNGMENT TRAINING: CPT

## 2020-12-09 RX ORDER — MIDODRINE HYDROCHLORIDE 2.5 MG/1
2.5 TABLET ORAL
Qty: 90 TABLET | Refills: 0 | Status: SHIPPED
Start: 2020-12-09 | End: 2020-12-11 | Stop reason: HOSPADM

## 2020-12-09 RX ORDER — ATENOLOL 25 MG/1
25 TABLET ORAL NIGHTLY
Qty: 30 TABLET | Refills: 0 | Status: SHIPPED | OUTPATIENT
Start: 2020-12-09

## 2020-12-09 RX ORDER — MIDODRINE HYDROCHLORIDE 5 MG/1
2.5 TABLET ORAL
Status: DISCONTINUED | OUTPATIENT
Start: 2020-12-09 | End: 2020-12-10

## 2020-12-09 RX ORDER — SENNA AND DOCUSATE SODIUM 50; 8.6 MG/1; MG/1
1 TABLET, FILM COATED ORAL 2 TIMES DAILY
COMMUNITY
Start: 2020-12-09

## 2020-12-09 RX ORDER — ATENOLOL 25 MG/1
25 TABLET ORAL NIGHTLY
Status: DISCONTINUED | OUTPATIENT
Start: 2020-12-09 | End: 2020-12-11 | Stop reason: HOSPADM

## 2020-12-09 RX ADMIN — ATENOLOL 25 MG: 25 TABLET ORAL at 21:12

## 2020-12-09 RX ADMIN — SODIUM CHLORIDE, PRESERVATIVE FREE 10 ML: 5 INJECTION INTRAVENOUS at 10:37

## 2020-12-09 RX ADMIN — ASPIRIN 325 MG: 325 TABLET, COATED ORAL at 09:19

## 2020-12-09 RX ADMIN — CLOPIDOGREL BISULFATE 75 MG: 75 TABLET ORAL at 09:19

## 2020-12-09 RX ADMIN — DOCUSATE SODIUM 50MG AND SENNOSIDES 8.6MG 1 TABLET: 8.6; 5 TABLET, FILM COATED ORAL at 21:12

## 2020-12-09 RX ADMIN — DOCUSATE SODIUM 50MG AND SENNOSIDES 8.6MG 1 TABLET: 8.6; 5 TABLET, FILM COATED ORAL at 09:19

## 2020-12-09 RX ADMIN — MIDODRINE HYDROCHLORIDE 2.5 MG: 5 TABLET ORAL at 18:01

## 2020-12-09 RX ADMIN — SODIUM CHLORIDE, PRESERVATIVE FREE 10 ML: 5 INJECTION INTRAVENOUS at 21:13

## 2020-12-09 RX ADMIN — SIMVASTATIN 20 MG: 10 TABLET, FILM COATED ORAL at 21:13

## 2020-12-09 ASSESSMENT — PAIN DESCRIPTION - DESCRIPTORS: DESCRIPTORS: SORE

## 2020-12-09 ASSESSMENT — PAIN DESCRIPTION - LOCATION
LOCATION: HIP

## 2020-12-09 ASSESSMENT — PAIN SCALES - GENERAL
PAINLEVEL_OUTOF10: 0

## 2020-12-09 ASSESSMENT — PAIN SCALES - WONG BAKER
WONGBAKER_NUMERICALRESPONSE: 4
WONGBAKER_NUMERICALRESPONSE: 4

## 2020-12-09 ASSESSMENT — PAIN DESCRIPTION - FREQUENCY: FREQUENCY: INTERMITTENT

## 2020-12-09 ASSESSMENT — PAIN DESCRIPTION - ORIENTATION
ORIENTATION: RIGHT

## 2020-12-09 ASSESSMENT — PAIN DESCRIPTION - PAIN TYPE
TYPE: SURGICAL PAIN

## 2020-12-09 NOTE — PROGRESS NOTES
Occupational Therapy  Facility/Department: [place] [id number] - MED SURG/ORTHO  Daily Treatment Note  NAME: Cori Hughes  : 1929  MRN: [id number]    Date of Service: 2020    Discharge Recommendations:  Subacute/Skilled Nursing Facility     Assessment   Performance deficits / Impairments: Decreased functional mobility ; Decreased ADL status; Decreased strength;Decreased safe awareness;Decreased cognition;Decreased endurance;Decreased balance;Decreased high-level IADLs  Assessment: Pt continues to demo progress toward OT goals, requiring just min A of 1 this date for functional transfers. Pt demos inconsistent ability to maintain TTWB. Pt would benefit from continued skilled OT to address the above noted occupational performance deficits. Prognosis: Good  OT Education: OT Role;Plan of Care;Precautions; ADL Adaptive Strategies;Transfer Training  Patient Education: disease specific:  TTWB, safety with transfers  REQUIRES OT FOLLOW UP: Yes  Activity Tolerance  Activity Tolerance: Patient Tolerated treatment well  Activity Tolerance: Supine: /77 ; Seated EOB BP 85/50 ; Standing BP 87/54 . After functional mobility and seated rest in chair /63 and   Safety Devices  Safety Devices in place: Yes  Type of devices: Left in chair;Chair alarm in place;Call light within reach;Nurse notified;Gait belt         Patient Diagnosis(es): The primary encounter diagnosis was Closed fracture of right hip, initial encounter ([facility] [address] Diagnoses of JENISE (acute kidney injury) ([address] Fall, initial encounter, Hematuria, unspecified type, and Closed fracture of one rib of left side, initial encounter were also pertinent to this visit. has a past medical history of Cancer [facility]), Coronary artery disease, CVA (cerebral vascular accident) ([facility] [facility] Hyperlipidemia, Hypertension, and Prostate cancer ([facility] [address]    has a past surgical history that includes Femur fracture surgery (Right, 11/30/2020). Restrictions  Restrictions/Precautions  Restrictions/Precautions: Weight Bearing, General Precautions, Fall Risk  Required Braces or Orthoses?: No  Lower Extremity Weight Bearing Restrictions  Right Lower Extremity Weight Bearing: Toe Touch Weight Bearing  Position Activity Restriction  Other position/activity restrictions: \"Touch down\" weight bearing RLE, high fall risk, thigh high compression stockings     Subjective   General  Chart Reviewed: Yes  Patient assessed for rehabilitation services?: Yes  Response to previous treatment: Patient with no complaints from previous session  Family / Caregiver Present: No  Referring Practitioner: Kathlean Brunner, DO 11/30/2020  Diagnosis: Closed right hip fracture 11/29/2020    Subjective  Subjective: Pt resting in bed, agreeable to OT treatment.     Pain Assessment  Pain Assessment: Faces  Armenta-Baker Pain Rating: Hurts little more  Pain Type: Surgical pain  Pain Location: Hip  Pain Orientation: Right  Non-Pharmaceutical Pain Intervention(s): Ambulation/Increased Activity;Repositioned  Pre Treatment Pain Screening  Intervention List: Patient able to continue with treatment  Vital Signs  Patient Currently in Pain: Yes     Orientation  Orientation  Overall Orientation Status: Within Functional Limits     Objective    ADL  Grooming: Setup;Supervision(brushing teeth and washing face seated)     Balance  Sitting Balance: Supervision  Standing Balance: Minimal assistance(with RW)  Standing Balance  Activity: standing for orthostatic BP reading; 2 trials functional mobility with RW and chair follow    Functional Mobility  Functional - Mobility Device: Rolling Walker  Activity: Other  Assist Level: Minimal assistance  Functional Mobility Comments: pt demos inconsistent ability to maintain TTWB    Bed mobility  Supine to Sit: Stand by assistance(to R with HOB elevated)     Transfers  Sit to stand: Minimal assistance  Stand to sit: Minimal assistance

## 2020-12-09 NOTE — PROGRESS NOTES
Hospitalist Progress Note      PCP: Ann Anthony MD    Date of Admission: 11/29/2020    Chief Complaint:  Right hip pain     Hospital Course:       Subjective:  pain controlled, bp better but still dropped again today and variable, does not feel dizzy/lightheaded    Medications:  Reviewed    Infusion Medications   Scheduled Medications    sodium chloride flush  10 mL Intravenous 2 times per day    sennosides-docusate sodium  1 tablet Oral BID    aspirin  325 mg Oral Daily    [Held by provider] atenolol  25 mg Oral Daily    clopidogrel  75 mg Oral Daily    [Held by provider] lisinopril  2.5 mg Oral Daily    simvastatin  20 mg Oral Nightly     PRN Meds: cyclobenzaprine, sodium chloride flush, magnesium hydroxide, sodium chloride flush, acetaminophen **OR** acetaminophen, polyethylene glycol, promethazine **OR** ondansetron, traMADol **OR** traMADol      Intake/Output Summary (Last 24 hours) at 12/9/2020 0904  Last data filed at 12/9/2020 0200  Gross per 24 hour   Intake --   Output 625 ml   Net -625 ml       Physical Exam Performed:    BP (!) 151/68   Pulse 90   Temp 97.9 °F (36.6 °C) (Oral)   Resp 16   Ht 5' 11\" (1.803 m)   Wt 175 lb (79.4 kg)   SpO2 97%   BMI 24.41 kg/m²     General appearance: No apparent distress, appears stated age and cooperative. HEENT: Pupils equal, round, and reactive to light. Conjunctivae/corneas clear. Neck: Supple, with full range of motion. No jugular venous distention. Trachea midline. Respiratory:  Normal respiratory effort. Clear to auscultation, bilaterally without Rales/Wheezes/Rhonchi. Cardiovascular: Regular rate and rhythm with normal S1/S2 without murmurs, rubs or gallops. Abdomen: Soft, non-tender, non-distended with normal bowel sounds.   Musculoskeletal: No clubbing, cyanosis or edema bilaterally.  Full range of motion without deformity except right hip dressing c/d/i  Skin: Skin color, texture, turgor normal.  No rashes or lesions. Neurologic:  Neurovascularly intact without any focal sensory/motor deficits. Cranial nerves: II-XII intact, grossly non-focal.  Psychiatric: Alert and oriented, thought content appropriate, normal insight  Capillary Refill: Brisk,< 3 seconds   Peripheral Pulses: +2 palpable, equal bilaterally        Labs:   Recent Labs     12/07/20  0549 12/08/20  0718 12/09/20  0708   WBC 10.2 10.1 9.3   HGB 10.5* 11.4* 11.0*   HCT 31.1* 33.6* 32.6*    386 423     Recent Labs     12/07/20  0549 12/08/20  0718 12/09/20  0708   * 136 135*   K 4.2 4.3 4.1    103 102   CO2 23 25 25   BUN 28* 26* 27*   CREATININE 1.0 1.0 1.0   CALCIUM 8.5 8.5 8.5     No results for input(s): AST, ALT, BILIDIR, BILITOT, ALKPHOS in the last 72 hours. No results for input(s): INR in the last 72 hours. No results for input(s): Shellia Bugler in the last 72 hours. Urinalysis:      Lab Results   Component Value Date    NITRU Negative 11/29/2020    WBCUA 0-2 11/29/2020    BACTERIA Rare 11/29/2020    RBCUA 11-20 11/29/2020    BLOODU MODERATE 11/29/2020    SPECGRAV >=1.030 11/29/2020    GLUCOSEU Negative 11/29/2020       Radiology:  XR FEMUR RIGHT (MIN 2 VIEWS)   Final Result   Successful internal reduction of proximal left femur fracture. Please refer   to procedure notes for additional details. CT Head WO Contrast   Final Result   No acute intracranial abnormality. CT Cervical Spine WO Contrast   Final Result   No acute abnormality of the cervical spine. XR CHEST PORTABLE   Final Result   Left 4th rib fracture. XR HIP 2-3 VW W PELVIS RIGHT   Final Result   1. Acute comminuted intertrochanteric fracture of the right femur with   subtrochanteric extension, overriding, displacement, and angulation. 2. Bony demineralization.          FLUORO FOR SURGICAL PROCEDURES    (Results Pending)           Assessment/Plan:    Active Hospital Problems    Diagnosis    Closed right hip fracture ([address] [S72.001A]    JENISE (acute kidney injury) ([facility] [N17.9]    Closed right hip fracture, initial encounter ([facility] [S72.001A]    HTN (hypertension) [I10]    Hyperlipidemia [E78.5]       pain in setting of right hip fracture d/t mechanical fall  -X-ray right pelvis revealed acute comminuted intertrochanteric fracture of the right femur with subtrochanteric extension, overriding, displacement and angulation  -CT cervical spine revealed no acute abnormality of cervical spine  -CT head revealed no acute intracranial abnormality  -Chest x-ray revealed a left fourth rib fracture  -morphine given in ED  -orthopedic surgery consulted,s/p surgery 11/30  -PRN pain medication  -Based on the evaluation on 11/29/2020, and diagnostic testing including EKG, there are no apparent cardiac contraindications to the proposed procedure.  All questions/concerns that could be addressed were addressed. Encourage use of incentive spirometry although patient is at low risk for perioperative cardiopulmonary complication. Juan Deluna estimated risk probability for perioperative MI or cardiac arrest is 0.47%.   The patient appears to be an appropriate candidate for surgery if ortho deemed necessary.     Postop Anemia on top of chronic normocytic anemia- due to surgery, 11.8/34.7 on admission, no s/s of bleeding  -monitored h/h  -transfused given symptomatic, 2u on 12/6     JENISE, CR 1.8 on admission, resolved as of 12/3  -likely 2/2 poor po intake  -1 L NS given in ED  -monitored with IVF  -bmp monitored   -held acei     Essential HTN- with multiple periods of orthostatic hypotension(not improving with ivfs/blood)  -on atenolol and lisinopril, was holding for low bp  -continued asa and plavix   -cards consulted given lack of improvement with blood/ivf, ?possible autonomic dysfunction   -midodrine started  -ordered ashok hose/abd binder  F/u closely with primary cards    HLD  -continued simvastatin     DVT Prophylaxis: on asa/plavix  Diet: DIET GENERAL;  Dietary Nutrition Supplements: Standard High Calorie Oral Supplement  Code Status: Full Code    PT/OT Eval Status: rec snf    Dispo - check orthostatics, pending cards Tawny Brittle, MD

## 2020-12-09 NOTE — PROGRESS NOTES
[facility] 81   Daily Progress Note    Admit Date:  11/29/2020  HPI:    Chief Complaint   Patient presents with    Fall     Pt was doing yard work in his field and fell down. Pt has R hip pain with some shortening and external rotation. Pt given 100 mcg of fentanyl IM via EMS while in route. Pt denies syncope or hitting head.  Hip Pain      Presented with right hip pain s/p mechanical fall, s/p repair 12/1/2020. He developed acute blood loss anemia and required 2 units of packed red blood cell transfusion. He was also noted to have JENISE felt secondary to volume depletion. He was noted to have orthostatic hypotension despite transfusion and IV fluid hydration. He has a past medical history of CAD status post PCI to the RCA in 2007 with residual disease in the LAD, hypertension, hyperlipidemia, prior CVA 2002, bladder cancer s/p chemo and radiation December 2018, history of prostate cancer 2008, chronic anemia. He is normally very active for 80-year-old male. He has had falls in the past but all of them mechanical.  He does admit to some passing out spells but over the past several years. Subjective:   Few seen lying flat in bed. Denies any pain. Denies any lightheadedness or dizziness. Denies any palpitations. He has worked with PT/OT yesterday and today with up to the side of bed and to chair. Blood pressures with position changes and following ambulation noted per PT/OT notes. He remains orthostatic despite further IV hydration and compression stockings to lower extremities and abdominal binder.     Objective:   BP (!) 151/68   Pulse 90   Temp 97.9 °F (36.6 °C) (Oral)   Resp 16   Ht 5' 11\" (1.803 m)   Wt 175 lb (79.4 kg)   SpO2 97%   BMI 24.41 kg/m²       Intake/Output Summary (Last 24 hours) at 12/9/2020 0954  Last data filed at 12/9/2020 0200  Gross per 24 hour   Intake --   Output 625 ml   Net -625 ml     Wt Readings from Last 3 Encounters:   11/29/20 175 lb (79.4 kg) 02/21/20 180 lb (81.6 kg)   03/29/18 210 lb (95.3 kg)     Vitals during session: 12/9/20  Supine: BP = 130/77, HR = 102 bpm.   Seated EOB: BP = 85/50, HR = 112 bpm.   Standing: BP = 87/54, HR = 130 bpm.   After sitting EOB x 4 minutes: BP = 99/61. After 2 bouts of amb: BP = 82/44, HR = 129 bpm.   After resting in chair x 5 minutes: BP = 110/63, HR = 137 bpm       Vitals during session:  12/8/20  Supine: BP = 159/65, HR = 106 bpm.    Seated EOB: BP = 93/55, HR = 123 bpm.    After sitting EOB x 5 minutes: BP = 102/68, HR = 118 bpm.    After bed>chair: BP = 82/46, HR = 123 bpm.    After sitting up in chair x 4 minutes: BP = 124/75, HR = 116 bpm.          ASSESSMENT:   1. Orthostatic hypotension: Persistent despite transfusion, IV hydration and compression  2. HTN: SBP 150s when supine  3. CAD: On aspirin, Plavix, simvastatin  4. History CVA: On aspirin and Plavix and statin  5. Right hip fracture status post repair  6. Acute blood loss anemia: H/H stable      PLAN:  1. Add midodrine 2.5 mg 3 times daily, last dose no later than 6 PM  2. Continue lower extremity compression stockings as well as abdominal binder  3. Continue aspirin and statin for CAD  4. Continue to hold lisinopril due to orthostatic hypotension. Consider resuming atenolol 25 mg but changed to at bedtime dosing  5. Okay for discharge from cardiac perspective. Recommend follow-up with primary cardiologist (Dr. Jose Rodriguez) within the next 2 to 3 weeks.     May MICHAEL Castano - CNP, 12/9/2020, 9:54 AM  [facility]   [phone]       Telemetry: SR 90's  NYHA: III    Physical Exam:  General:  Awake, alert, NAD  Skin:  Warm and dry  Neck:  JVP 8 cm flat  Chest:  Clear to auscultation   Cardiovascular:  RRR, normal O9F3, soft systolic murmur, no g/r  Abdomen:  Soft, nontender, +bowel sounds  Extremities:  No BLE edema      Medications:    sodium chloride flush  10 mL Intravenous 2 times per day    sennosides-docusate sodium  1 tablet Oral BID  aspirin  325 mg Oral Daily    [Held by provider] atenolol  25 mg Oral Daily    clopidogrel  75 mg Oral Daily    [Held by provider] lisinopril  2.5 mg Oral Daily    simvastatin  20 mg Oral Nightly         Lab Data: Lab results independently reviewed by myself 12/9/20   CBC:   Recent Labs     12/07/20  0549 12/08/20  0718 12/09/20  0708   WBC 10.2 10.1 9.3   HGB 10.5* 11.4* 11.0*    386 423     BMP:    Recent Labs     12/07/20  0549 12/08/20  0718 12/09/20  0708   * 136 135*   K 4.2 4.3 4.1   CO2 23 25 25   BUN 28* 26* 27*   CREATININE 1.0 1.0 1.0     INR:  No results for input(s): INR in the last 72 hours. BNP:  No results for input(s): PROBNP in the last 72 hours. Cardiac Enzymes: No results for input(s): TROPONINI in the last 72 hours. Lipids: No results found for: TRIG, HDL, LDLCALC, LDLDIRECT    Cardiac Imaging:    OSH 2007  Coronary angiography summary: Two vessel disease in a right dominant system.      The LMCA is normal.        The LAD gives rise to two diagonals and a septal arcade and has minor irregularities and a     50-60% mid portion narrowing.       The circumflex gives rise to two marginals and has minor irregularities and no significant     stenoses.       The RCA is a dominant vessel with an 80-90% proximal lesion and long serial 95-99% mid and     distal lesions. There is sluggish flow.       Left ventriculography in the SANCHEZ projection utilizing 20 cc's of non-ionic contrast     demonstrates mild inferior hypokinesis. LVEF is approximately 50%. There is no mitral     insufficiency visualized.      Hemodynamics:  The aortic pressure is 170/90. LV is 170/11. There is no aortic gradient on     pullback recording.      There were no complications.      The plan is complex RCA intervention.            INTERVENTIONAL PROCEDURE:  A [address] [place] [address] JR4 guide was utilized. Heparin 12,000 units was     administered and the ACT maintained at greater then 300. Plavix was preloaded.  A 2 mm

## 2020-12-09 NOTE — PROGRESS NOTES
Physical Therapy  Facility/Department: [facility] C5 - MED SURG/ORTHO  Daily Treatment Note  NAME: Itz Abbott  : 1929  MRN: [id number]    Date of Service: 2020    Discharge Recommendations:  Subacute/Skilled Nursing Facility   PT Equipment Recommendations  Equipment Needed: No    Assessment   Assessment: Pt participated well with LE ther ex and mobility this date. Pt much better able to maintain TTWB status during transfers today than during previous sessions -- although still requires min verbal/tactile cues from therapist.  Pt also able to amb increased distance with RW and Suyapa x 1-2, fatiguing with increasing distance with distance limited by ability to maintain TTWB. Pt still demonstrates orthostatic BP's with standing, although BP's stabilize with seated rest breaks and pt is asymptomatic. Cont. to recommend SNF at D/C given current deficits in strength and mobility. Treatment Diagnosis: Decreased ROM/strength RLE and (I) with mobility s/p R femur fx with IM nailing on 20  Specific instructions for Next Treatment: Progress ther ex and mobility as tolerated  Prognosis: Good  Decision Making: Medium Complexity  PT Education: Goals; General Safety;Gait Training;PT Role;Plan of Care;Disease Specific Education; Functional Mobility Training;Equipment;Precautions;Transfer Training;Weight-bearing Education;Home Exercise Program;Family Education  Patient Education: Disease-specific education: Pt educated on TTWB status for RLE and safety in transfers/amb; pt verbalizes understanding. REQUIRES PT FOLLOW UP: Yes  Activity Tolerance: Patient Tolerated treatment well; Other  Activity Tolerance: Vitals during session:  Supine: BP = 130/77, HR = 102 bpm.  Seated EOB: BP = 85/50, HR = 112 bpm.  Standing: BP = 87/54, HR = 130 bpm.  After sitting EOB x 4 minutes: BP = 99/61.   After 2 bouts of amb: BP = 82/44, HR = 129 bpm.  After resting in chair x 5 minutes: BP = 110/63, HR = 137 bpm.     Patient Diagnosis(es): The primary encounter diagnosis was Closed fracture of right hip, initial encounter ([facility] Diagnoses of JENISE (acute kidney injury) ([facility] Fall, initial encounter, Hematuria, unspecified type, and Closed fracture of one rib of left side, initial encounter were also pertinent to this visit. has a past medical history of Cancer [facility]), Coronary artery disease, CVA (cerebral vascular accident) ([facility] Hyperlipidemia, Hypertension, and [facility] has a past surgical history that includes Femur fracture surgery (Right, 11/30/2020). Restrictions  Restrictions/Precautions  Restrictions/Precautions: Weight Bearing, General Precautions, Fall Risk  Required Braces or Orthoses?: No  Lower Extremity Weight Bearing Restrictions  Right Lower Extremity Weight Bearing: Toe Touch Weight Bearing  Position Activity Restriction  Other position/activity restrictions: \"Touch down\" weight bearing RLE, high fall risk, thigh high compression stockings    Subjective   General  Chart Reviewed: Yes  Response To Previous Treatment: Patient with no complaints from previous session. Family / Caregiver Present: No  Referring Practitioner: Dr. Kalen Kaur  Subjective  Subjective: Pt agreeable to work with PT this morning. States he's eager to get out of the hospital.  \"I'd love to be out of here before my birthday this Saturday. \"  General Comment  Comments: Pt resting in bed upon entry of therapy staff  Pain Screening  Patient Currently in Pain: Yes  Pain Assessment  Pain Assessment: Faces  Armenta-Baker Pain Rating: Hurts little more  Pain Type: Surgical pain  Pain Location: Hip  Pain Orientation: Right  Pain Descriptors: Sore  Pain Frequency: Intermittent(pain increases when sitting EOB)  Non-Pharmaceutical Pain Intervention(s): Ambulation/Increased Activity;Repositioned; Emotional support       Orientation  Orientation  Overall Orientation Status: Within Functional Limits    Objective   Bed mobility  Supine to Sit: Unable to assess(pt sitting EOB with THORNTON upon entry of PT)  Scooting: Stand by assistance(to scoot forward at EOB and forward<>backward in chair)     Transfers  Sit to Stand: 2 Person Assistance(Suyapa x 1-2 depending on pt's level of fatigue, mod cues needed to consistently maintain TTWB RLE during transfer)  Stand to sit: Minimal Assistance  Bed to Chair: 2 Person Assistance(Suyapa x 1-2 using RW, moving from bed>chair toward R side)     Ambulation  WB Status: TTWB RLE  Surface: level tile  Device: Rolling Walker  Assistance: 2 Person assistance(Suyapa x 1-2 depending on level of fatigue)  Quality of Gait: Pt amb with slow mark with min-mod VC's needed for proper sequencing with walker. Pt fatigues quickly and bears too much weight through R foot during last 3-4 feet of first bout of amb. Pt's ability to maintain TTWB consistently improved during 2nd bout of amb.   Gait Deviations: Slow Mark;Decreased step length;Decreased step height  Distance: x 10 feet, x 12 feet     Balance  Posture: Good  Sitting - Static: Good  Sitting - Dynamic: Fair;+  Standing - Static: Fair  Standing - Dynamic: Fair;-(using RW)     Exercises  Gluteal Sets: x 30 bilat  Knee Long Arc Quad: x 15 BLE at EOB  Ankle Pumps: x 30 BLE     AM-PAC Score  AM-PAC Inpatient Mobility Raw Score : 15 (12/09/20 1216)  AM-PAC Inpatient T-Scale Score : 39.45 (12/09/20 1216)  Mobility Inpatient CMS 0-100% Score: 57.7 (12/09/20 1216)  Mobility Inpatient CMS G-Code Modifier : CK (12/09/20 1216)    Goals  Short term goals  Time Frame for Short term goals: 5 days, 12/06/20 (unless otherwise specified) - goal time frame extended to 12/09/20 and again to 12/12/20 due to longer-than-expected LOS  Short term goal 1: Pt will transfer supine <-> sit with Suyapa x 1 for RLE - MET 12/03/20  Short term goal 2: Pt will transfer sit <-> stand and bed>chair using RW with min/CGA x 1 while maintaining TTWB RLE - not yet met but progressing toward goal (Suyapa x 1-2 as of 12/09/20)  Short term goal 3: By 12/04/20: Pt will tolerate 12-15 reps RLE ther ex for ROM/strengthening - MET 12/02/20, goal ongoing  Short term goal 4: Pt will ambulate x 15 feet using RW with Suyapa x 1 while maintaining TTWB RLE - not yet met but progressing toward goal (amb x 12 feet using RW with Suyapa x 1-2 as of 12/09/20)  Patient Goals   Patient goals : \"To be able to go home as soon as I'm able. \"    Plan    Times per week: Once daily 7x/week  Times per day: Daily  Specific instructions for Next Treatment: Progress ther ex and mobility as tolerated  Current Treatment Recommendations: Strengthening, Balance Training, Endurance Training, Functional Mobility Training, Transfer Training, Gait Training, ROM, Home Exercise Program, Safety Education & Training, Patient/Caregiver Education & Training, Equipment Evaluation, Education, & procurement, Positioning  Safety Devices: All fall risk precautions in place, Left in chair, Call light within reach, Chair alarm in place, Nurse notified, Gait belt, Patient at risk for falls    Therapy Time   Individual Concurrent Group Co-treatment   Time In 1026         Time Out 1105         Minutes 39         Timed Code Treatment Minutes: [place], [place] #[id number]    If pt is unable to be seen after this session, please let this note serve as discharge summary. Please see case management note for discharge disposition. Thank you.

## 2020-12-10 LAB
ANION GAP SERPL CALCULATED.3IONS-SCNC: 7 MMOL/L (ref 3–16)
BUN BLDV-MCNC: 29 MG/DL (ref 7–20)
CALCIUM SERPL-MCNC: 8.6 MG/DL (ref 8.3–10.6)
CHLORIDE BLD-SCNC: 100 MMOL/L (ref 99–110)
CO2: 26 MMOL/L (ref 21–32)
CORTISOL TOTAL: 16.5 UG/DL
CREAT SERPL-MCNC: 1.1 MG/DL (ref 0.8–1.3)
GFR AFRICAN AMERICAN: >60
GFR NON-AFRICAN AMERICAN: >60
GLUCOSE BLD-MCNC: 117 MG/DL (ref 70–99)
HCT VFR BLD CALC: 32.3 % (ref 40.5–52.5)
HEMOGLOBIN: 11 G/DL (ref 13.5–17.5)
MCH RBC QN AUTO: 32.8 PG (ref 26–34)
MCHC RBC AUTO-ENTMCNC: 34.1 G/DL (ref 31–36)
MCV RBC AUTO: 96.3 FL (ref 80–100)
PDW BLD-RTO: 13.4 % (ref 12.4–15.4)
PLATELET # BLD: 395 K/UL (ref 135–450)
PMV BLD AUTO: 7.5 FL (ref 5–10.5)
POTASSIUM SERPL-SCNC: 4.4 MMOL/L (ref 3.5–5.1)
RBC # BLD: 3.36 M/UL (ref 4.2–5.9)
SARS-COV-2, NAAT: NOT DETECTED
SODIUM BLD-SCNC: 133 MMOL/L (ref 136–145)
TSH SERPL DL<=0.05 MIU/L-ACNC: 4.37 UIU/ML (ref 0.27–4.2)
WBC # BLD: 9 K/UL (ref 4–11)

## 2020-12-10 PROCEDURE — 84443 ASSAY THYROID STIM HORMONE: CPT

## 2020-12-10 PROCEDURE — 97530 THERAPEUTIC ACTIVITIES: CPT

## 2020-12-10 PROCEDURE — 6370000000 HC RX 637 (ALT 250 FOR IP): Performed by: ORTHOPAEDIC SURGERY

## 2020-12-10 PROCEDURE — 97110 THERAPEUTIC EXERCISES: CPT

## 2020-12-10 PROCEDURE — 80048 BASIC METABOLIC PNL TOTAL CA: CPT

## 2020-12-10 PROCEDURE — 97116 GAIT TRAINING THERAPY: CPT

## 2020-12-10 PROCEDURE — 85027 COMPLETE CBC AUTOMATED: CPT

## 2020-12-10 PROCEDURE — 99232 SBSQ HOSP IP/OBS MODERATE 35: CPT | Performed by: NURSE PRACTITIONER

## 2020-12-10 PROCEDURE — 82533 TOTAL CORTISOL: CPT

## 2020-12-10 PROCEDURE — 6370000000 HC RX 637 (ALT 250 FOR IP): Performed by: NURSE PRACTITIONER

## 2020-12-10 PROCEDURE — 2580000003 HC RX 258: Performed by: ORTHOPAEDIC SURGERY

## 2020-12-10 PROCEDURE — 6370000000 HC RX 637 (ALT 250 FOR IP): Performed by: INTERNAL MEDICINE

## 2020-12-10 PROCEDURE — 36415 COLL VENOUS BLD VENIPUNCTURE: CPT

## 2020-12-10 PROCEDURE — 1200000000 HC SEMI PRIVATE

## 2020-12-10 RX ORDER — MIDODRINE HYDROCHLORIDE 5 MG/1
5 TABLET ORAL
Status: DISCONTINUED | OUTPATIENT
Start: 2020-12-10 | End: 2020-12-11 | Stop reason: HOSPADM

## 2020-12-10 RX ADMIN — SIMVASTATIN 20 MG: 10 TABLET, FILM COATED ORAL at 22:02

## 2020-12-10 RX ADMIN — CLOPIDOGREL BISULFATE 75 MG: 75 TABLET ORAL at 08:50

## 2020-12-10 RX ADMIN — DOCUSATE SODIUM 50MG AND SENNOSIDES 8.6MG 1 TABLET: 8.6; 5 TABLET, FILM COATED ORAL at 08:50

## 2020-12-10 RX ADMIN — SODIUM CHLORIDE, PRESERVATIVE FREE 10 ML: 5 INJECTION INTRAVENOUS at 21:55

## 2020-12-10 RX ADMIN — DOCUSATE SODIUM 50MG AND SENNOSIDES 8.6MG 1 TABLET: 8.6; 5 TABLET, FILM COATED ORAL at 22:01

## 2020-12-10 RX ADMIN — MIDODRINE HYDROCHLORIDE 5 MG: 5 TABLET ORAL at 12:42

## 2020-12-10 RX ADMIN — MIDODRINE HYDROCHLORIDE 2.5 MG: 5 TABLET ORAL at 08:49

## 2020-12-10 RX ADMIN — ATENOLOL 25 MG: 25 TABLET ORAL at 21:55

## 2020-12-10 RX ADMIN — SODIUM CHLORIDE, PRESERVATIVE FREE 10 ML: 5 INJECTION INTRAVENOUS at 08:52

## 2020-12-10 RX ADMIN — MIDODRINE HYDROCHLORIDE 5 MG: 5 TABLET ORAL at 16:44

## 2020-12-10 RX ADMIN — ASPIRIN 325 MG: 325 TABLET, COATED ORAL at 08:50

## 2020-12-10 ASSESSMENT — PAIN DESCRIPTION - ORIENTATION
ORIENTATION: RIGHT

## 2020-12-10 ASSESSMENT — PAIN SCALES - WONG BAKER: WONGBAKER_NUMERICALRESPONSE: 6

## 2020-12-10 ASSESSMENT — PAIN SCALES - GENERAL
PAINLEVEL_OUTOF10: 2
PAINLEVEL_OUTOF10: 0

## 2020-12-10 ASSESSMENT — PAIN DESCRIPTION - LOCATION
LOCATION: HIP
LOCATION: LEG
LOCATION: HIP

## 2020-12-10 ASSESSMENT — PAIN DESCRIPTION - PAIN TYPE
TYPE: SURGICAL PAIN

## 2020-12-10 NOTE — CARE COORDINATION
CASE MANAGEMENT DISCHARGE SUMMARY      Discharge to: [facility], Skilled. Precertification completed: N/A  Hospital Exemption Notification (HENS) completed: Yes    New Durable Medical Equipment ordered/agency: None    Transportation:    Family/car: [employer]   Medical Transport explained to [employer]. Pt/family voice no agency preference. Agency used: [address] up time: 1400   Ambulance form completed: Yes    Confirmed discharge plan with: Spoke to [place] on the phone and he will update Mom. Patient: yes     Facility/Agency, name:  [facility] faxed- to [facility] with [facility] [place]   Phone number for report to facility: [phone]     RN, name: Kartik Aguilera    Note: Discharging nurse to complete [facility], reconcile [facility]S, and place final copy with patient's discharge packet. RN to ensure that written prescriptions for  Level II medications are sent with patient to the facility as per protocol.

## 2020-12-10 NOTE — PROGRESS NOTES
Notified Cardiology pt BP dropped to 59/37 with PT. Pt is currently up to chair and asymptomatic. Las BP while up to chair 117/62.

## 2020-12-10 NOTE — PROGRESS NOTES
Physical Therapy  Facility/Department: [facility] C5 - MED SURG/ORTHO  Daily Treatment Note  NAME: Steven Dueñas  : 1929  MRN: [id number]    Date of Service: 12/10/2020    Discharge Recommendations:  Subacute/Skilled Nursing Facility   PT-Related Equipment Needs: No (TBD at SNF)    Assessment   Body structures, Functions, Activity limitations: Decreased functional mobility ; Decreased strength;Decreased endurance;Decreased ROM; Decreased balance;Decreased posture; Increased pain  Assessment: Pt participated well with LE ther ex and mobility this date. Pt able to maintain TTWB status during transfers/amb today with fewer cues and now performing functional mobility tasks with assist x 1. Pt still demonstrates orthostatic BP's with standing (dropping from 164/68 in supine to 59/37 when standing), although BP's stabilize within several minutes with seated rest.  Pt is largely asymptomatic with drops in BP, although does admit to feeling mildly \"light\" in head when standing. Cont. to recommend SNF at D/C given current deficits in strength and mobility. Treatment Diagnosis: Decreased ROM/strength RLE and (I) with mobility s/p R femur fx with IM nailing on 20  Specific instructions for Next Treatment: Progress ther ex and mobility as tolerated  Prognosis: Good  Decision Making: Medium Complexity  PT Education: Goals; General Safety;Gait Training;PT Role;Plan of Care;Disease Specific Education; Functional Mobility Training;Equipment;Precautions;Transfer Training;Weight-bearing Education;Home Exercise Program;Family Education  Patient Education: Disease-specific education: Pt educated on TTWB status for RLE and safety in transfers/amb; pt verbalizes understanding. Activity Tolerance  Activity Tolerance: Patient Tolerated treatment well; Other  Activity Tolerance: Vitals during session:     Supine: BP = 164/68, HR = 71 bpm.     Seated EOB: BP = 74/44, HR = 76 bpm.     After sitting EOB x 3 minutes: BP = 93/53, HR = 70 bpm.     Standing: BP = 59/37, HR = 82 bpm.     After bed>chair transfer: BP = 90/48, HR = 76 bpm.     After sitting in chair x 3 minutes:  BP = 113/61, HR = 71 bpm.     After amb: BP = 77/43, HR = 76 bpm.     After resting in chair x 5 minutes: BP = 117/62, HR = 71 bpm.    Pt largely asymptomatic even with drops in BP although did admit to feeling \"a little light\" in head when standing. Patient Diagnosis(es): The primary encounter diagnosis was Closed fracture of right hip, initial encounter ([facility] Diagnoses of JENISE (acute kidney injury) ([facility] Fall, initial encounter, Hematuria, unspecified type, and Closed fracture of one rib of left side, initial encounter were also pertinent to this visit. has a past medical history of Cancer [facility]), Coronary artery disease, CVA (cerebral vascular accident) ([facility] Hyperlipidemia, Hypertension, and Prostate cancer ([facility] has a past surgical history that includes Femur fracture surgery (Right, 11/30/2020). Restrictions  Restrictions/Precautions  Restrictions/Precautions: Weight Bearing, General Precautions, Fall Risk  Required Braces or Orthoses?: No  Lower Extremity Weight Bearing Restrictions  Right Lower Extremity Weight Bearing: Toe Touch Weight Bearing  Position Activity Restriction  Other position/activity restrictions: \"Touch down\" weight bearing RLE, high fall risk, thigh high compression stockings, abdominal binder for orthostatic hypotension     Subjective   General  Chart Reviewed: Yes  Response To Previous Treatment: Patient with no complaints from previous session. Family / Caregiver Present: Yes(granddaughter)  Referring Practitioner: Dr. Pam Matos: Pt agreeable to work with PT this morning. States he's eager to get out of the hospital.  Hopeful to D/C to SNF today.   General Comment  Comments: Pt resting in bed upon entry of therapy staff  Pain Screening  Patient Currently in Pain: Denies    Orientation  Orientation  Overall Orientation Status: Within Functional Limits    Objective   Bed mobility  Supine to Sit: Supervision(moving toward R side, HOB elevated ~30 degrees, pt using R bedrail)  Scooting: Supervision(to scoot forward to EOB)     Transfers  Sit to Stand: Moderate Assistance(from EOB (height of bed not elevated) to RW)  Stand to sit: Minimal Assistance  Bed to Chair: Moderate assistance;Minimal assistance(using RW, moving toward R side, min cues for technique & TTWB RLE)     Ambulation  WB Status: TTWB RLE  Surface: level tile  Device: Rolling Walker  Assistance: Minimal assistance  Quality of Gait: Pt amb with slow mark with min VC's needed for proper sequencing with walker and for proper WB. Pt's ability to maintain TTWB consistently improved today vs. yesterday. Gait Deviations: Slow Mark;Decreased step length;Decreased step height  Distance: x 25 feet     Balance  Posture: Good  Sitting - Static: Good  Sitting - Dynamic: Fair;+  Standing - Static: Fair;+  Standing - Dynamic: Fair(using RW)     Exercises  Quad Sets: x 30 BLE  Heelslides: x 20 RLE with CGA  Gluteal Sets: x 30 bilat  Knee Short Arc Quad: x 20 RLE (I)  Ankle Pumps: x 30 BLE     Other Activities: Assisted pt in donning B thigh-high POLLO hose in bed.     AM-PAC Score  -PAC Inpatient Mobility Raw Score : 16 (12/10/20 1527)  AM-PAC Inpatient T-Scale Score : 40.78 (12/10/20 1527)  Mobility Inpatient CMS 0-100% Score: 54.16 (12/10/20 1527)  Mobility Inpatient CMS G-Code Modifier : CK (12/10/20 1527)    Goals  Short term goals  Time Frame for Short term goals: 5 days, 12/06/20 (unless otherwise specified) - goal time frame extended to 12/09/20 and again to 12/12/20 due to longer-than-expected LOS  Short term goal 1: Pt will transfer supine <-> sit with Suyapa x 1 for RLE - MET 12/03/20  Short term goal 2: Pt will transfer sit <-> stand and bed>chair using RW with min/CGA x 1 while maintaining TTWB RLE - not yet met but progressing toward goal (modA x 1 as of 12/10/20)  Short term goal 3: By 12/04/20: Pt will tolerate 12-15 reps RLE ther ex for ROM/strengthening - MET 12/02/20, goal ongoing  Short term goal 4: Pt will ambulate x 15 feet using RW with Suyapa x 1 while maintaining TTWB RLE - MET 12/10/20. Goal upgraded: Pt will ambulate x 50 feet using RW with Suyapa x 1 while maintaining TTWB RLE with min cues. Patient Goals   Patient goals : \"To be able to go home as soon as I'm able. \"    Plan    Times per week: Once daily 7x/week  Times per day: Daily  Specific instructions for Next Treatment: Progress ther ex and mobility as tolerated  Current Treatment Recommendations: Strengthening, Balance Training, Endurance Training, Functional Mobility Training, Transfer Training, Gait Training, ROM, Home Exercise Program, Safety Education & Training, Patient/Caregiver Education & Training, Equipment Evaluation, Education, & procurement, Positioning  Safety Devices: All fall risk precautions in place, Left in chair, Call light within reach, Chair alarm in place, Nurse notified, Gait belt, Patient at risk for falls    Therapy Time   Individual Concurrent Group Co-treatment   Time In 1034         Time Out 1127         Minutes 53         Timed Code Treatment Minutes: [address] [place], [place] #[id number]    If pt is unable to be seen after this session, please let this note serve as discharge summary. Please see case management note for discharge disposition. Thank you.

## 2020-12-10 NOTE — PROGRESS NOTES
Occupational Therapy  Facility/Department: [facility] C5 - MED SURG/ORTHO  Daily Treatment Note  NAME: Brandon Baca  : 1929  MRN: [id number]    Date of Service: 12/10/2020    Discharge Recommendations:  Subacute/Skilled Nursing Facility     Assessment   Performance deficits / Impairments: Decreased functional mobility ; Decreased ADL status; Decreased strength;Decreased safe awareness;Decreased cognition;Decreased endurance;Decreased balance;Decreased high-level IADLs  Assessment: Pt continues to be limited at times by orthostatic hypotension, unable to address standing ADL goal d/t BP limitations. Pt tolerating bed to chair well with min A of 1 and moderate cueing for TTWB and safe hand placement, poor carryover of education. Pt continues to function below his baseline and would benefit from continued skilled OT to address the above noted occupational performance deficits. Prognosis: Good  OT Education: OT Role;Plan of Care;Precautions;Transfer Training;Home Exercise Program  Patient Education: disease specific:  TTWB, safety with transfers  REQUIRES OT FOLLOW UP: Yes  Activity Tolerance  Activity Tolerance: Patient Tolerated treatment well;Treatment limited secondary to medical complications (free text)  Activity Tolerance: BP /62, in chair after transfer 74/33, after seated rest ~5 mins 124/62. Safety Devices  Safety Devices in place: Yes  Type of devices: Left in chair;Chair alarm in place;Call light within reach;Nurse notified;Gait belt         Patient Diagnosis(es): The primary encounter diagnosis was Closed fracture of right hip, initial encounter ([facility] [address] Diagnoses of JENISE (acute kidney injury) ([facility] [address] Fall, initial encounter, Hematuria, unspecified type, and Closed fracture of one rib of left side, initial encounter were also pertinent to this visit.       has a past medical history of Cancer [facility]), Coronary artery disease, CVA (cerebral vascular accident) ([facility] [address] Hyperlipidemia, Hypertension, and Prostate cancer ([facility] [address] has a past surgical history that includes Femur fracture surgery (Right, 11/30/2020). Restrictions  Restrictions/Precautions  Restrictions/Precautions: Weight Bearing, General Precautions, Fall Risk  Required Braces or Orthoses?: No  Lower Extremity Weight Bearing Restrictions  Right Lower Extremity Weight Bearing: Toe Touch Weight Bearing  Position Activity Restriction  Other position/activity restrictions: \"Touch down\" weight bearing RLE, high fall risk, thigh high compression stockings, abdominal binder for orthostatic hypotension     Subjective   General  Chart Reviewed: Yes  Patient assessed for rehabilitation services?: Yes  Response to previous treatment: Patient with no complaints from previous session  Family / Caregiver Present: No  Referring Practitioner: Win Obando DO 11/30/2020  Diagnosis: Closed right hip fracture 11/29/2020    Subjective  Subjective: Pt resting in bed, agreeable to get up to chair with minimal encouragement. Pain Assessment  Pain Assessment: Faces  Armenta-Baker Pain Rating: Hurts even more  Pain Type: Surgical pain  Pain Location: Hip  Pain Orientation: Right  Non-Pharmaceutical Pain Intervention(s): Repositioned  Vital Signs  Patient Currently in Pain: Yes     Orientation  Orientation  Overall Orientation Status: Within Functional Limits     Objective    ADL  Additional Comments: Pt declined ADLs this session.      Balance  Sitting Balance: Supervision  Standing Balance: Minimal assistance(with RW)  Standing Balance  Activity: bed to chair stand step transfer with RW    Bed mobility  Supine to Sit: Stand by assistance(to R with HOB elevated)     Transfers  Sit to stand: Minimal assistance  Stand to sit: Minimal assistance  Transfer Comments: cues for safe hand placement and TTWB     Cognition  Overall Cognitive Status: Exceptions  Memory: Decreased recall of precautions  Safety Judgement: Decreased awareness of need for assistance;Decreased awareness of need for safety  Problem Solving: Assistance required to identify errors made;Assistance required to correct errors made;Decreased awareness of errors  Insights: Decreased awareness of deficits  Initiation: Requires cues for some  Sequencing: Requires cues for some  Cognition Comment: frequent reminders regarding TTWB on RLE     Type of ROM/Therapeutic Exercise  Type of ROM/Therapeutic Exercise: AROM  Comment: seated in chair  Exercises  Shoulder Flexion: 20x  Horizontal ABduction: 20x  Horizontal ADduction: 20x  Elbow Flexion: 20x  Elbow Extension: 20x  Supination: 20x  Pronation: 20x  Wrist Flexion: 20x  Wrist Extension: 20x  Finger Flexion: 20x  Finger Extension: 20x     Plan   Plan  Times per week: 3-5x/wk in acute  Current Treatment Recommendations: Safety Education & Training, Self-Care / ADL, Balance Training, Strengthening, Functional Mobility Training, Endurance Training, Pain Management    AM-PAC Score  AM-[facility] Inpatient Daily Activity Raw Score: 14 (12/10/20 1526)  AM-PAC Inpatient ADL T-Scale Score : 33.39 (12/10/20 1526)  ADL Inpatient CMS 0-100% Score: 59.67 (12/10/20 1526)  ADL Inpatient CMS G-Code Modifier : CK (12/10/20 1526)    Goals  Short term goals  Time Frame for Short term goals:  One week unless otherwise specified - 12/08- -extended d/t LOS to 12/15  Short term goal 1: Pt. will perform functional transfer w/ minAx2 -- GOAL MET, pt completed with min A of 2 12/7/20  Short term goal 2: Pt. will complete standing level grooming tasks w/ correct adherence to RLE WB status --ongoing 12/10/20, limited by orthostatic hypotension  Short term goal 3: Pt. will complete 3 BUE exercises x15 in preparation for ADL tasks by 12/05 -- GOAL MET, pt demos 20 reps 12/9/20  Patient Goals   Patient goals : \"I want to get to the chair with only a little bit of help\"       Therapy Time   Individual Concurrent Group Co-treatment   Time In 1410         Time Out 1440         Minutes 30           [facility] [place] Gertrude

## 2020-12-10 NOTE — PROGRESS NOTES
[facility]     Cardiology                                     Progress Note    Admission date:  2020    Reason for follow up visit: orthostatic hypotension    HPI/CC: Miryam Corbin was admitted on 2020 with a mechanical fall and right hip fracture. On 2020 he had a right hip IM nailing. Has also been treated for orthostatic hypotension     Subjective: He complains of some hip pain but denies chest pain, palpitations, shortness of breath, and dizziness. Vitals:  Blood pressure 121/60, pulse 69, temperature 97.8 °F (36.6 °C), temperature source Oral, resp. rate 16, height 5' 11\" (1.803 m), weight 175 lb (79.4 kg), SpO2 98 %.   Temp  Av.9 °F (36.6 °C)  Min: 97.8 °F (36.6 °C)  Max: 98.1 °F (36.7 °C)  Pulse  Av.8  Min: 69  Max: 103  BP  Min: 116/58  Max: 156/70  SpO2  Av.3 %  Min: 97 %  Max: 99 %    24 hour I/O    Intake/Output Summary (Last 24 hours) at 12/10/2020 1339  Last data filed at 12/10/2020 1228  Gross per 24 hour   Intake --   Output 350 ml   Net -350 ml     Current Facility-Administered Medications   Medication Dose Route Frequency Provider Last Rate Last Dose    midodrine (PROAMATINE) tablet 5 mg  5 mg Oral TID  Chalino Anguiano MD   5 mg at 12/10/20 1242    atenolol (TENORMIN) tablet 25 mg  25 mg Oral Nightly MICHAEL Gillespie - CNP   25 mg at 202    cyclobenzaprine (FLEXERIL) tablet 10 mg  10 mg Oral TID PRN Leanora Bevel Renae, DO   10 mg at 20 1703    sodium chloride flush 0.9 % injection 10 mL  10 mL Intravenous 2 times per day Farzaneh Organ, DO   10 mL at 12/10/20 3614    sodium chloride flush 0.9 % injection 10 mL  10 mL Intravenous PRN Leanora Bevel Renae, DO        sennosides-docusate sodium (SENOKOT-S) 8.6-50 MG tablet 1 tablet  1 tablet Oral BID Farzaneh Organ, DO   1 tablet at 12/10/20 0850    magnesium hydroxide (MILK OF MAGNESIA) 400 MG/5ML suspension 30 mL  30 mL Oral Daily PRN Leanora Bevel Renae, DO   30 mL at 12/04/20 2025    aspirin EC tablet 325 mg  325 mg Oral Daily Maryagnes Harvey, DO   325 mg at 12/10/20 0850    clopidogrel (PLAVIX) tablet 75 mg  75 mg Oral Daily Elizabeth Railing Renae, DO   75 mg at 12/10/20 0850    [Held by provider] lisinopril (PRINIVIL;ZESTRIL) tablet 2.5 mg  2.5 mg Oral Daily Elizabeth Railing Renae, DO   2.5 mg at 12/01/20 0088    simvastatin (ZOCOR) tablet 20 mg  20 mg Oral Nightly Elizabeth Railing Renae, DO   20 mg at 12/09/20 2113    sodium chloride flush 0.9 % injection 10 mL  10 mL Intravenous PRN Elizabeth Railing Renae, DO        acetaminophen (TYLENOL) tablet 650 mg  650 mg Oral Q6H PRN Maryagnes Harvey, DO   650 mg at 12/04/20 2025    Or    acetaminophen (TYLENOL) suppository 650 mg  650 mg Rectal Q6H PRN Maryagnes Harvey, DO        polyethylene glycol (GLYCOLAX) packet 17 g  17 g Oral Daily PRN Maryagnes Harvey, DO   17 g at 12/04/20 6665    promethazine (PHENERGAN) tablet 12.5 mg  12.5 mg Oral Q6H PRN Elizabeth Railing Renae, DO        Or    ondansetron [facility]) injection 4 mg  4 mg Intravenous Q6H PRN Elizabeth Railing Renae, DO        traMADol (ULTRAM) tablet 50 mg  50 mg Oral Q6H PRN Maryagnes Harvey, DO   50 mg at 12/08/20 1211    Or    traMADol (ULTRAM) tablet 100 mg  100 mg Oral Q6H PRN Elizabeth Railing Renae, DO   100 mg at 11/30/20 6498       Objective:     Telemetry monitor: not on tele    Physical Exam:  Constitutional and general appearance: alert, cooperative, no distress and appears stated age  HEENT: PERRL, no cervical lymphadenopathy. No masses palpable.  Normal oral mucosa  Respiratory:  · Normal excursion and expansion without use of accessory muscles  · Resp auscultation: Normal breath sounds without wheezing, rhonchi, and rales  Cardiovascular:  · The apical impulse is not displaced  · Heart tones are crisp and normal. regular S1 and S2.  · Jugular venous pulsation Normal  · The carotid upstroke is normal in amplitude and contour without delay or bruit  · Peripheral pulses are symmetrical and full Abdomen:  · No masses or tenderness  · Bowel sounds present  Extremities:  ·  No cyanosis or clubbing  ·  No lower extremity edema  ·  Skin: warm and dry  Neurological:  · Alert and oriented  · Moves all extremities well  · No abnormalities of mood, affect, memory, mentation, or behavior are noted    Data  EKG 11/29/2020:   SR HR 72    [facility] 2007 ([facility]):  Successful PTCA and subsequent deployment of a 2.75 x 20 mm Taxus drug eluting stent to a long Type C 80-90% proximal RCA lesion with less then 10% residual.   Successful PTCA and subsequent deployment of overlapping 2.5 x 16 mm Taxus drug eluting   stents to two serial 99% distal RCA lesions, which essentially comprises one lesion, which   is also a Type C lesion reducing it to less then 10% residual.   There were no complications. All labs and testing reviewed. Lab Review     Renal Profile:   Lab Results   Component Value Date    CREATININE 1.1 12/10/2020    BUN 29 12/10/2020     12/10/2020    K 4.4 12/10/2020    K 5.0 11/30/2020     12/10/2020    CO2 26 12/10/2020     CBC:    Lab Results   Component Value Date    WBC 9.0 12/10/2020    RBC 3.36 12/10/2020    HGB 11.0 12/10/2020    HCT 32.3 12/10/2020    MCV 96.3 12/10/2020    RDW 13.4 12/10/2020     12/10/2020     BNP:  No results found for: BNP  Fasting Lipid Panel:  No results found for: CHOL, HDL, TRIG  Cardiac Enzymes:  CK/MbTroponin  Lab Results   Component Value Date    TROPONINI <0.01 03/30/2018     PT/ INR   Lab Results   Component Value Date    INR 1.14 11/29/2020    PROTIME 13.3 11/29/2020     PTT No results found for: PTT No results found for: MG No results found for: TSH    Assessment:  Orthostatic hypotension: ongoing    -midodrine started 12/9/2020   -orthostatics positive today   HTN: controlled   CAD   -s/p PCI 2007  Right hip fracture: s/p repair 11/30/2020  Acute blood loss anemia: stable     Plan:   1. Continue ASA, Plavix, and statin   2.  Can increase atenolol to 50mg po QD if needed for supine HTN as this is also helpful with orthostatic hypotension  3. Continue midodrine 5mg po TID and increase as needed   4. Would continue to hold lisinopril  5. 20-30mmHg BLE compression stockings (currently has thigh high POLLO hose while inpatient)  6.  Adequate water intake and eat small frequent meals     MICHAEL Nieto-RADHA [address]  [phone]

## 2020-12-10 NOTE — PROGRESS NOTES
Hospitalist Progress Note      PCP: Radha Romero MD    Date of Admission: 11/29/2020    Chief Complaint:  Right hip pain     Hospital Course:       Subjective:  pain controlled, bp better but still dropped again today to 02M systolic and variable, does not feel dizzy/lightheaded,granddaughter at bedside         Medications:  Reviewed    Infusion Medications   Scheduled Medications    midodrine  5 mg Oral TID WC    atenolol  25 mg Oral Nightly    sodium chloride flush  10 mL Intravenous 2 times per day    sennosides-docusate sodium  1 tablet Oral BID    aspirin  325 mg Oral Daily    clopidogrel  75 mg Oral Daily    [Held by provider] lisinopril  2.5 mg Oral Daily    simvastatin  20 mg Oral Nightly     PRN Meds: cyclobenzaprine, sodium chloride flush, magnesium hydroxide, sodium chloride flush, acetaminophen **OR** acetaminophen, polyethylene glycol, promethazine **OR** ondansetron, traMADol **OR** traMADol      Intake/Output Summary (Last 24 hours) at 12/10/2020 1154  Last data filed at 12/9/2020 2114  Gross per 24 hour   Intake --   Output 150 ml   Net -150 ml       Physical Exam Performed:    BP (!) 156/70   Pulse 69   Temp 97.8 °F (36.6 °C) (Oral)   Resp 16   Ht 5' 11\" (1.803 m)   Wt 175 lb (79.4 kg)   SpO2 98%   BMI 24.41 kg/m²     General appearance: No apparent distress, appears stated age and cooperative. HEENT: Pupils equal, round, and reactive to light. Conjunctivae/corneas clear. Neck: Supple, with full range of motion. No jugular venous distention. Trachea midline. Respiratory:  Normal respiratory effort. Clear to auscultation, bilaterally without Rales/Wheezes/Rhonchi. Cardiovascular: Regular rate and rhythm with normal S1/S2 without murmurs, rubs or gallops. Abdomen: Soft, non-tender, non-distended with normal bowel sounds.   Musculoskeletal: No clubbing, cyanosis or edema bilaterally.  Full range of motion without deformity except right hip dressing c/d/i  Skin: Skin Diagnosis    Orthostatic hypotension [I95.1]    Coronary artery disease involving native coronary artery of native heart without angina pectoris [I25.10]    Closed right hip fracture ([facility] [address] [S72.001A]    JENISE (acute kidney injury) ([facility] [address] [N17.9]    [facility] [S72.001A]    HTN (hypertension) [I10]    Hyperlipidemia [E78.5]         pain in setting of right hip fracture d/t mechanical fall  -X-ray right pelvis revealed acute comminuted intertrochanteric fracture of the right femur with subtrochanteric extension, overriding, displacement and angulation  -CT cervical spine revealed no acute abnormality of cervical spine  -CT head revealed no acute intracranial abnormality  -Chest x-ray revealed a left fourth rib fracture  -morphine given in ED  -orthopedic surgery consulted,s/p surgery 11/30  -PRN pain medication  -Based on the evaluation on 11/29/2020, and diagnostic testing including EKG, there are no apparent cardiac contraindications to the proposed procedure.  All questions/concerns that could be addressed were addressed. Encourage use of incentive spirometry although patient is at low risk for perioperative cardiopulmonary complication. Hoang Qureshiip estimated risk probability for perioperative MI or cardiac arrest is 0.47%.   The patient appears to be an appropriate candidate for surgery if ortho deemed necessary.     Postop Anemia on top of chronic normocytic anemia- due to surgery, 11.8/34.7 on admission, no s/s of bleeding  -monitored h/h  -transfused given symptomatic, 2u on 12/6     JENISE, CR 1.8 on admission, resolved as of 12/3  -likely 2/2 poor po intake  -1 L NS given in ED  -monitored with IVF  -bmp monitored   -held acei     Essential HTN- with multiple periods of orthostatic hypotension(not improving with ivfs/blood)  -on atenolol and lisinopril, was holding for low bp  -continued asa and plavix   -cards consulted given lack of improvement with blood/ivf, ?possible autonomic dysfunction   -midodrine started  -ordered ashok hose/abd binder  F/u closely with primary cards   -cortisol wnl  -tsh essen wnl    HLD  -continued simvastatin     DVT Prophylaxis: on asa/plavix  Diet: DIET GENERAL;  Dietary Nutrition Supplements: Standard High Calorie Oral Supplement  Code Status: Full Code    PT/OT Eval Status: rec snf    Dispo - pending cards recs, inc midodrine to 5mg, asked neuro input also    Chalino Anguiano MD

## 2020-12-10 NOTE — CARE COORDINATION
CM updated by Chavez Elena RN that pt orthostatic hypotensive, MD now wanting to hold pt to give Pt BP meds. Pushed back transport till 1900 today and updated Meera with [facility] [place], RN updated pt's son. CM following-Azalea Meyer RN       ADDENDUM 1352: Spoke to MD and updated that discharge cancelled, pt will be seen again by Cardiology. Updated Mihir Douglass with [facility] [place] and spoke to pt's son Brenden Jeong. Win ruiz pick-up to [phone] 12/11/20.  CM following-Azalea Meyer RN

## 2020-12-10 NOTE — PROGRESS NOTES
Comprehensive Nutrition Assessment    Type and Reason for Visit:  Reassess    Nutrition Recommendations/Plan:   1. Continue general diet  2. Continue Ensure ONS  3. Encourage po intakes  4. Monitor po intakes, nutrition adequacy, weights, pertinent labs, BMs    Nutrition Assessment:  Follow up: Pt remains nutritionally compromised d/t report of so-so appetite. Pt states that he does not like the food offered. Po intakes variable per EMR, 1-100%. Discussed menu options with pt. Pt reports that he has been drinking Ensure regularly. Will continue ONS. Encouraged po intakes. Will continue general diet. Malnutrition Assessment:  Malnutrition Status:   At risk for malnutrition (Comment)      Estimated Daily Nutrient Needs:  Energy (kcal):  1734-7342; Weight Used for Energy Requirements:  Current     Protein (g):  ; Weight Used for Protein Requirements:  Current(1.2-1.4 g/kg)        Fluid (ml/day):  1 ml/kcal; Method Used for Fluid Requirements:  1 ml/kcal      Nutrition Related Findings:  BM x1 on 12/8      Wounds:  None(Abraision)       Current Nutrition Therapies:    DIET GENERAL;  Dietary Nutrition Supplements: Standard High Calorie Oral Supplement    Anthropometric Measures:  · Height: 5' 11\" (180.3 cm)  · Current Body Weight: 175 lb (79.4 kg)   · Admission Body Weight: 175 lb (79.4 kg)    · Ideal Body Weight: 172 lbs  · BMI: 24.4  · BMI Categories: Normal Weight (BMI 22.0 to 24.9) age over 72       Nutrition Diagnosis:   · Increased nutrient needs related to increase demand for energy/nutrients as evidenced by (increased protein needs d/t fracture and potential surgery)      Nutrition Interventions:   Food and/or Nutrient Delivery:  Continue Current Diet, Start Oral Nutrition Supplement  Nutrition Education/Counseling:  No recommendation at this time   Coordination of Nutrition Care:  Continue to monitor while inpatient    Goals:  Pt will have po intakes 50% or greater this admission       Nutrition Monitoring and Evaluation:   Behavioral-Environmental Outcomes:  None Identified   Food/Nutrient Intake Outcomes:  Food and Nutrient Intake, Supplement Intake  Physical Signs/Symptoms Outcomes:  Weight     Discharge Planning:     Too soon to determine     Electronically signed by Kamlesh Espino RD, LD on 12/10/20 at 3:48 PM EST    Contact: [phone]

## 2020-12-11 VITALS
TEMPERATURE: 97.9 F | WEIGHT: 175 LBS | DIASTOLIC BLOOD PRESSURE: 71 MMHG | RESPIRATION RATE: 16 BRPM | BODY MASS INDEX: 24.5 KG/M2 | HEART RATE: 73 BPM | SYSTOLIC BLOOD PRESSURE: 127 MMHG | HEIGHT: 71 IN | OXYGEN SATURATION: 97 %

## 2020-12-11 LAB
ANION GAP SERPL CALCULATED.3IONS-SCNC: 6 MMOL/L (ref 3–16)
BUN BLDV-MCNC: 33 MG/DL (ref 7–20)
CALCIUM SERPL-MCNC: 8.7 MG/DL (ref 8.3–10.6)
CHLORIDE BLD-SCNC: 100 MMOL/L (ref 99–110)
CO2: 26 MMOL/L (ref 21–32)
CREAT SERPL-MCNC: 1.1 MG/DL (ref 0.8–1.3)
GFR AFRICAN AMERICAN: >60
GFR NON-AFRICAN AMERICAN: >60
GLUCOSE BLD-MCNC: 107 MG/DL (ref 70–99)
HCT VFR BLD CALC: 32.7 % (ref 40.5–52.5)
HEMOGLOBIN: 11 G/DL (ref 13.5–17.5)
MCH RBC QN AUTO: 32.6 PG (ref 26–34)
MCHC RBC AUTO-ENTMCNC: 33.7 G/DL (ref 31–36)
MCV RBC AUTO: 96.9 FL (ref 80–100)
PDW BLD-RTO: 13.2 % (ref 12.4–15.4)
PLATELET # BLD: 426 K/UL (ref 135–450)
PMV BLD AUTO: 7.5 FL (ref 5–10.5)
POTASSIUM SERPL-SCNC: 4.3 MMOL/L (ref 3.5–5.1)
RBC # BLD: 3.37 M/UL (ref 4.2–5.9)
SODIUM BLD-SCNC: 132 MMOL/L (ref 136–145)
WBC # BLD: 9.2 K/UL (ref 4–11)

## 2020-12-11 PROCEDURE — 2580000003 HC RX 258: Performed by: ORTHOPAEDIC SURGERY

## 2020-12-11 PROCEDURE — 97116 GAIT TRAINING THERAPY: CPT

## 2020-12-11 PROCEDURE — 97530 THERAPEUTIC ACTIVITIES: CPT

## 2020-12-11 PROCEDURE — 6370000000 HC RX 637 (ALT 250 FOR IP): Performed by: INTERNAL MEDICINE

## 2020-12-11 PROCEDURE — 99232 SBSQ HOSP IP/OBS MODERATE 35: CPT | Performed by: NURSE PRACTITIONER

## 2020-12-11 PROCEDURE — 85027 COMPLETE CBC AUTOMATED: CPT

## 2020-12-11 PROCEDURE — 97535 SELF CARE MNGMENT TRAINING: CPT

## 2020-12-11 PROCEDURE — 97110 THERAPEUTIC EXERCISES: CPT

## 2020-12-11 PROCEDURE — 80048 BASIC METABOLIC PNL TOTAL CA: CPT

## 2020-12-11 PROCEDURE — 99222 1ST HOSP IP/OBS MODERATE 55: CPT | Performed by: PSYCHIATRY & NEUROLOGY

## 2020-12-11 PROCEDURE — 36415 COLL VENOUS BLD VENIPUNCTURE: CPT

## 2020-12-11 PROCEDURE — 6370000000 HC RX 637 (ALT 250 FOR IP): Performed by: ORTHOPAEDIC SURGERY

## 2020-12-11 RX ORDER — MIDODRINE HYDROCHLORIDE 5 MG/1
5 TABLET ORAL
Qty: 90 TABLET | Refills: 3
Start: 2020-12-11

## 2020-12-11 RX ADMIN — ASPIRIN 325 MG: 325 TABLET, COATED ORAL at 08:27

## 2020-12-11 RX ADMIN — SODIUM CHLORIDE, PRESERVATIVE FREE 10 ML: 5 INJECTION INTRAVENOUS at 08:27

## 2020-12-11 RX ADMIN — MIDODRINE HYDROCHLORIDE 5 MG: 5 TABLET ORAL at 17:13

## 2020-12-11 RX ADMIN — MIDODRINE HYDROCHLORIDE 5 MG: 5 TABLET ORAL at 08:27

## 2020-12-11 RX ADMIN — DOCUSATE SODIUM 50MG AND SENNOSIDES 8.6MG 1 TABLET: 8.6; 5 TABLET, FILM COATED ORAL at 08:27

## 2020-12-11 RX ADMIN — MIDODRINE HYDROCHLORIDE 5 MG: 5 TABLET ORAL at 12:42

## 2020-12-11 RX ADMIN — CLOPIDOGREL BISULFATE 75 MG: 75 TABLET ORAL at 08:27

## 2020-12-11 ASSESSMENT — PAIN DESCRIPTION - ORIENTATION: ORIENTATION: RIGHT

## 2020-12-11 ASSESSMENT — PAIN SCALES - WONG BAKER: WONGBAKER_NUMERICALRESPONSE: 4

## 2020-12-11 ASSESSMENT — PAIN DESCRIPTION - LOCATION: LOCATION: HIP

## 2020-12-11 ASSESSMENT — PAIN DESCRIPTION - PAIN TYPE: TYPE: SURGICAL PAIN

## 2020-12-11 NOTE — DISCHARGE INSTR - DIET

## 2020-12-11 NOTE — CONSULTS
In patient Neurology consult        [facility] Neurology      Heavenly Dueñas MD      Diaz Cathiemadalyn  12/12/1929    Date of Service: 12/11/2020    Referring Physician: Haley Hook MD      Reason for the consult and CC: Recent mechanical fall and recurrent orthostatic hypotension. HPI:   The patient is a 80y.o.  years old male with multiple medical problems going history of CAD, bladder and prostate cancer, anemia and hypertension who was admitted to the hospital about 12 days ago with a mechanical fall resulting in right hip fracture and successful hip surgery and repair few days later. Postop, the patient continues to have significant lightheadedness and dizziness. Further work-up revealed orthostatic hypotension that did not improve with fluid and blood replacement. He was seen by different specialist.  His most recent vital signs from 2 days ago supine blood pressure 166/69 with a pulse of 89, sitting 100/59 with a pulse of 98, standing 90/50 with a pulse of 117. Patient was not very symptomatic. The patient is currently on midodrine 5 mg 3 times daily. Today he denies any headache or chest pain or focal weakness. No recent fever or chills. Recent CT of the head from last month showed no acute injury or bleeding. He denies any history of recurrent lightheadedness or dizziness at home, no history of polyneuropathy. No other relieving or aggravating factors. Other review of system was unremarkable. History reviewed. No pertinent family history.   Past Surgical History:   Procedure Laterality Date    FEMUR FRACTURE SURGERY Right 11/30/2020    FEMUR INTRAMEDULLARY NAIL BRITTNEY INSERTION RIGHT performed by Win Obando DO at [facility] 1        Past Medical History:   Diagnosis Date    Cancer [facility])     Bladder CA status post chemo/radiation 2018    Coronary artery disease     PCI 2007    CVA [facility]cerebral vascular accident) ([facility] 2002    Hyperlipidemia     Hypertension     Prostate cancer ([facility] g at 12/04/20 4422    promethazine (PHENERGAN) tablet 12.5 mg  12.5 mg Oral Q6H PRN Leanora Bevel Renae, DO        Or    ondansetron [facility]) injection 4 mg  4 mg Intravenous Q6H PRN Leanora Bevel Renae, DO        traMADol [facility]) tablet 50 mg  50 mg Oral Q6H PRN Farzaneh Organ, DO   50 mg at 12/08/20 1211    Or    traMADol (ULTRAM) tablet 100 mg  100 mg Oral Q6H PRN Leanora Bevel Renae, DO   100 mg at 11/30/20 0851       ROS : A 10-14 system review of constitutional, cardiovascular, respiratory, eyes, musculoskeletal, endocrine, GI, ENT, skin, hematological, genitourinary, psychiatric and neurologic systems was obtained and updated today and is unremarkable except as mentioned in my HPI      Exam:     Constitutional:   Vitals:    12/10/20 1646 12/10/20 2020 12/10/20 2346 12/11/20 0831   BP: (!) 106/59 (!) 172/55 128/64 (!) 124/59   Pulse: 79 78 72 76   Resp:  16 16 16   Temp:  97.5 °F (36.4 °C) 97.6 °F (36.4 °C) 97.9 °F (36.6 °C)   TempSrc:  Oral Oral Oral   SpO2: 96% 96% 98% 96%   Weight:       Height:           General appearance:  Normal development and appear in no acute distress. Eye: No icterus. Fundus: Funduscopic examination cannot be performed due to COVID19 restrictions and precautions. Neck: supple  Cardiovascular: No lower leg edema with good pulsation. Mental Status:   Oriented to person, place, problem, and time. Memory: Aware of recent and remote event. Good immediate recall. Intact remote memory  Normal attention span and concentration. Language: intact naming, repeating and fluency   Good fund of Knowledge. Aware of current events and vocabulary   Cranial Nerves:   II: Visual fields: Full. Pupils: equal, round, reactive to light  III,IV,VI: Extra Ocular Movements are intact.  No nystagmus  V: Facial sensation is intact   VII: Facial strength and movements: intact and symmetric  VIII: Hearing: Intact  IX: Palate elevation is symmetric  XI: Shoulder shrug is intact  XII: Tongue movements are normal  Musculoskeletal: 5/5 in all 4 extremities. No focal weakness. Exam limited to his right leg due to recent hip surgery. Tone: Normal tone. Reflexes: 2+ in the upper extremity and 2+ in the lower extremity   Planters: flexor bilaterally. Coordination: no pronator drift, no dysmetria with FNF in upper extremities. Normal REM. Sensation: normal to all modalities in both arms and legs. Gait/Posture: Not tested due to recent surgery. Data:  LABS:   Lab Results   Component Value Date     12/10/2020    K 4.4 12/10/2020    K 5.0 11/30/2020     12/10/2020    CO2 26 12/10/2020    BUN 29 12/10/2020    CREATININE 1.1 12/10/2020    GFRAA >60 12/10/2020    LABGLOM >60 12/10/2020    GLUCOSE 117 12/10/2020    PHOS 2.5 03/30/2018    CALCIUM 8.6 12/10/2020     Lab Results   Component Value Date    WBC 9.2 12/11/2020    RBC 3.37 12/11/2020    HGB 11.0 12/11/2020    HCT 32.7 12/11/2020    MCV 96.9 12/11/2020    RDW 13.2 12/11/2020     12/11/2020     Lab Results   Component Value Date    INR 1.14 11/29/2020    PROTIME 13.3 (H) 11/29/2020       Neuroimaging were independently reviewed by myself and discussed results with the patient  Reviewed notes from different physicians  Reviewed lab and blood testing    Impression:  Recurrent dizziness and a recent falling. Now with orthostatic hypotension. Most recent orthostatics few days ago showed evidence of orthostatic hypotension with reflex tachycardia which argues against autonomic failure. No evidence of parkinsonism or Parkinson disease on examination today. Could be combination of recent surgery, recent anemia, dehydration and prolonged hospital course. Would recommend to continue with the current supportive measures with the same dose of midodrine for now. No need to add another medication or increase the dose. Recheck orthostatics after adequate hydration.   Long discussion with the patient and his family regarding positional precautions at home and risk of falling or injury. He needs to continue using his walker  Continue current management for his supine hypertension  Continue aspirin and Plavix  PT and OT  Statin for hyperlipidemia  Can be discharged to rehab from neurology standpoint  No further recommendation  We will sign off. Thank you for referring such patient. If you have any questions regarding my consult note, please don't hesitate to call me. Germaine Chapin MD  [phone]    This dictation was generated by voice recognition computer software.  Although all attempts are made to edit the dictation for accuracy, there may be errors in the  transcription that are not intended

## 2020-12-11 NOTE — DISCHARGE SUMMARY
Hospital Medicine Discharge Summary    Patient ID: Cruz Corbin      Patient's PCP: Rodri Sanchez MD    Admit Date: 11/29/2020     Discharge Date: 12/11/2020      Admitting Physician: Jay Keller MD     Discharge Physician: Rosendo Mccoy MD     Discharge Diagnoses: Active Hospital Problems    Diagnosis    Fall [W19. XXXA]    Orthostatic hypotension [I95.1]    Coronary artery disease involving native coronary artery of native heart without angina pectoris [I25.10]    [facility] [S72.001A]    JENISE (acute kidney injury) ([facility] [N17.9]    Closed right hip fracture, initial encounter ([facility] [S72.001A]    HTN (hypertension), benign [I10]    Hyperlipidemia [E78.5]       The patient was seen and examined on day of discharge and this discharge summary is in conjunction with any daily progress note from day of discharge. Hospital Course:   History Of Present Illness:       80 y.o. male, with PMH of HTN and HLD, who presented to [facility] with right hip pain. History obtained from the patient and review of EMR. The patient stated this afternoon he was outside doing yard work and getting wood for the house. He stated he stepped off of his tractor and onto something that caused him to lose his balance. The patient stated he fell backwards and landed on his right hip. He stated he laid there for 30 minutes until he was able to get someone to come and help him. The patient denied hitting his head and/or loss of consciousness. In the emergency room an x-ray of the right hip was obtained that revealed acute comminuted intertrochanteric fracture of the right femur with subtrochanteric extension, overriding, displacement and angulation. Orthopedic surgery was consulted in the emergency room. The patient has been made n.p.o. for anticipated surgery tomorrow. The will be admitted for further evaluation. The patient denied any other associated symptoms as well as any aggravating and/or alleviating factors. At the time of this assessment, the patient was resting comfortably in bed. He currently denies any chest pain, back pain, abdominal pain, shortness of breath, numbness, tingling, N/V/C/D, fever and/or chills.          pain in setting of right hip fracture d/t mechanical fall  -X-ray right pelvis revealed acute comminuted intertrochanteric fracture of the right femur with subtrochanteric extension, overriding, displacement and angulation  -CT cervical spine revealed no acute abnormality of cervical spine  -CT head revealed no acute intracranial abnormality  -Chest x-ray revealed a left fourth rib fracture  -morphine given in ED  -orthopedic surgery consulted,s/p surgery 11/30  -PRN pain medication  -Based on the evaluation on 11/29/2020, and diagnostic testing including EKG, there are no apparent cardiac contraindications to the proposed procedure.  All questions/concerns that could be addressed were addressed. Encourage use of incentive spirometry although patient is at low risk for perioperative cardiopulmonary complication. Andres Vazquez estimated risk probability for perioperative MI or cardiac arrest is 0.47%.   The patient appears to be an appropriate candidate for surgery if ortho deemed necessary.     Postop Anemia on top of chronic normocytic anemia- due to surgery, 11.8/34.7 on admission, no s/s of bleeding  -monitored h/h  -transfused given symptomatic, 2u on 12/6     JENISE, CR 1.8 on admission, resolved as of 12/3  -likely 2/2 poor po intake  -1 L NS given in ED  -monitored with IVF  -bmp monitored   -held acei, stopped on dc     Essential HTN- with multiple periods of orthostatic hypotension(not improving with ivfs/blood)  -on atenolol and lisinopril, was holding for low bp  -continued asa and plavix   -cards consulted given lack of improvement with blood/ivf, added midodrine,   -neuro consulted(felt multifactorial-surgery, anemia, dehydration, prolonged stay, rather than autonomic dysfunction   -midodrine started with improvement  -ordered ashok hose/abd binder  F/u closely with primary cards   -cortisol wnl  -tsh essen wnl     HLD  -continued simvastatin    Physical Exam Performed:     /71   Pulse 73   Temp 97.9 °F (36.6 °C) (Oral)   Resp 16   Ht 5' 11\" (1.803 m)   Wt 175 lb (79.4 kg)   SpO2 97%   BMI 24.41 kg/m²       General appearance: No apparent distress, appears stated age and cooperative. HEENT: Pupils equal, round, and reactive to light. Conjunctivae/corneas clear. Neck: Supple, with full range of motion. No jugular venous distention. Trachea midline. Respiratory:  Normal respiratory effort. Clear to auscultation, bilaterally without Rales/Wheezes/Rhonchi. Cardiovascular: Regular rate and rhythm with normal S1/S2 without murmurs, rubs or gallops. Abdomen: Soft, non-tender, non-distended with normal bowel sounds. Musculoskeletal: No clubbing, cyanosis or edema bilaterally.  Full range of motion without deformity except right hip dressing c/d/i  Skin: Skin color, texture, turgor normal.  No rashes or lesions. Neurologic:  Neurovascularly intact without any focal sensory/motor deficits. Cranial nerves: II-XII intact, grossly non-focal.  Psychiatric: Alert and oriented, thought content appropriate, normal insight  Capillary Refill: Brisk,< 3 seconds   Peripheral Pulses: +2 palpable, equal bilaterally      Labs:  For convenience and continuity at follow-up the following most recent labs are provided:      CBC:    Lab Results   Component Value Date    WBC 9.2 12/11/2020    HGB 11.0 12/11/2020    HCT 32.7 12/11/2020     12/11/2020       Renal:    Lab Results   Component Value Date     12/11/2020    K 4.3 12/11/2020    K 5.0 11/30/2020     12/11/2020    CO2 26 12/11/2020    BUN 33 12/11/2020    CREATININE 1.1 12/11/2020    CALCIUM 8.7 12/11/2020    PHOS 2.5 03/30/2018         Significant Diagnostic Studies    Radiology:   XR FEMUR RIGHT (MIN 2 VIEWS)   Final Result   Successful internal reduction of proximal left femur fracture. Please refer   to procedure notes for additional details. CT Head WO Contrast   Final Result   No acute intracranial abnormality. CT Cervical Spine WO Contrast   Final Result   No acute abnormality of the cervical spine. XR CHEST PORTABLE   Final Result   Left 4th rib fracture. XR HIP 2-3 VW W PELVIS RIGHT   Final Result   1. Acute comminuted intertrochanteric fracture of the right femur with   subtrochanteric extension, overriding, displacement, and angulation. 2. Bony demineralization. FLUORO FOR SURGICAL PROCEDURES    (Results Pending)          Consults:     IP CONSULT TO ORTHOPEDIC SURGERY  IP CONSULT TO HOSPITALIST  IP CONSULT TO SOCIAL WORK  IP CONSULT TO CARDIOLOGY  IP CONSULT TO NEUROLOGY    Disposition:  SNF     Condition at Discharge: Stable    Discharge Instructions/Follow-up:  Ortho as outpt    Code Status:  FULL    Activity: activity as tolerated    Diet: regular diet      Discharge Medications:     Discharge Medication List as of 12/11/2020  3:49 PM           Details   sennosides-docusate sodium (SENOKOT-S) 8.6-50 MG tablet Take 1 tablet by mouth 2 times dailyOTC      traMADol (ULTRAM) 50 MG tablet Take 1 tablet by mouth every 6 hours as needed for Pain for up to 3 days. , Disp-10 tablet,R-0Print      cyclobenzaprine (FLEXERIL) 10 MG tablet Take 1 tablet by mouth 3 times daily as needed for Muscle spasms, Disp-10 tablet,R-0Print              Details   midodrine (PROAMATINE) 5 MG tablet Take 1 tablet by mouth 3 times daily (with meals) Give at 8am, 12pm, 4pm, Do not give after 1800 or within 4 hrs of bedtime, Disp-90 tablet,R-3NO PRINT      atenolol (TENORMIN) 25 MG tablet Take 1 tablet by mouth nightly, Disp-30 tablet,R-0Normal              Details   aspirin 325 MG EC tablet Take 325 mg by mouth dailyHistorical Med      clopidogrel (PLAVIX) 75 MG tablet Take 75 mg by mouth dailyHistorical Med      simvastatin (ZOCOR) 20 MG tablet Take 20 mg by mouth nightlyHistorical Med             Time Spent on discharge is more than 30 minutes in the examination, evaluation, counseling and review of medications and discharge plan. Signed:    Cheryle Estrada MD   12/11/2020      Thank you Dana Florian MD for the opportunity to be involved in this patient's care. If you have any questions or concerns please feel free to contact me at [phone].

## 2020-12-11 NOTE — CARE COORDINATION
CASE MANAGEMENT DISCHARGE SUMMARY        Discharge to: [address] completed: [facility] Exemption Notification (HENS) completed: Yes     New Durable Medical Equipment ordered/agency: None     Transportation:               Family/car: [employer]              Medical Transport explained to [employer]. Pt/family voice no agency preference. Agency used: [address] up time:  1650              Ambulance form completed: Yes     Confirmed discharge plan with:  Granddaughter Maria G Martin and she will updated Uncle Sadi and pt's wife.                 Patient: yes                 Facility/Agency, name:  [facility] faxed- to [facility] with [facility] [place]              Phone number for report to facility: [phone]                 RN, name: Judy Padron     Note: Discharging nurse to complete [facility], reconcile [facility], and place final copy with patient's discharge packet. RN to ensure that written prescriptions for  Level II medications are sent with patient to the facility as per protocol.

## 2020-12-11 NOTE — PROGRESS NOTES
All IV's removed without complications. Surgical Dressings changed at bedside. D/C instructions given to transport. Handoff report given to Gisella Franco. [facility] complete and written prescriptions for Level II medications given to transport sealed. Pt discharged with all belongings and taken down via stretcher.

## 2020-12-11 NOTE — PROGRESS NOTES
Closed fracture of one rib of left side, initial encounter were also pertinent to this visit. has a past medical history of Cancer [facility]), Coronary artery disease, CVA (cerebral vascular accident) ([facility] Hyperlipidemia, Hypertension, and Prostate cancer ([facility] has a past surgical history that includes Femur fracture surgery (Right, 11/30/2020). Restrictions  Restrictions/Precautions  Restrictions/Precautions: Weight Bearing, General Precautions, Fall Risk  Required Braces or Orthoses?: No  Lower Extremity Weight Bearing Restrictions  Right Lower Extremity Weight Bearing: Toe Touch Weight Bearing  Position Activity Restriction  Other position/activity restrictions: \"Touch down\" weight bearing RLE, high fall risk, thigh high compression stockings, abdominal binder for orthostatic hypotension  Subjective   General  Chart Reviewed: Yes  Response To Previous Treatment: Patient with no complaints from previous session. Family / Caregiver Present: No  Referring Practitioner: Dr. Migue Field  Subjective  Subjective: Pt agreeable to work with PT this morning. General Comment  Comments: Pt resting in bed upon entry of therapy staff  Pain Screening  Patient Currently in Pain: Denies  Vital Signs  Patient Currently in Pain: Denies       Orientation  Orientation  Overall Orientation Status: Within Normal Limits  Cognition      Objective   Bed mobility  Supine to Sit: Supervision  Transfers  Sit to Stand: Minimal Assistance  Stand to sit: Minimal Assistance  Ambulation  Ambulation?: Yes  WB Status: TTWB RLE  Ambulation 1  Surface: level tile  Device: Standard Walker  Assistance: Minimal assistance  Quality of Gait: Pt amb with slow mark with min VC's needed for proper sequencing with walker and for proper WB.   Pt's able to maintain TTWB inconsistently with transitionssit<>stand and initial steps - cues needed  Gait Deviations: Slow Mark;Decreased step length;Decreased step height  Distance: x 25 feet, 7x/week  Times per day: Daily  Specific instructions for Next Treatment: Progress ther ex and mobility as tolerated  Current Treatment Recommendations: Strengthening, Balance Training, Endurance Training, Functional Mobility Training, Transfer Training, Gait Training, ROM, Home Exercise Program, Safety Education & Training, Patient/Caregiver Education & Training, Equipment Evaluation, Education, & procurement, Positioning  Safety Devices  Type of devices:  All fall risk precautions in place, Left in chair, Call light within reach, Chair alarm in place, Nurse notified, Gait belt, Patient at risk for falls  Restraints  Initially in place: No     Therapy Time   Individual Concurrent Group Co-treatment   Time In 0846         Time Out 0925         Minutes 39         Timed Code Treatment Minutes: [facility]

## 2020-12-11 NOTE — PROGRESS NOTES
[address]     Cardiology                                     Progress Note    Admission date:  2020    Reason for follow up visit: orthostatic hypotension    HPI/CC: Moisés Corbin was admitted on 2020 with a mechanical fall and right hip fracture. On 2020 he had a right hip IM nailing. Has also been treated for orthostatic hypotension. Subjective: He denies chest pain, palpitations, shortness of breath, and dizziness. Vitals:  Blood pressure (!) 124/59, pulse 76, temperature 97.9 °F (36.6 °C), temperature source Oral, resp. rate 16, height 5' 11\" (1.803 m), weight 175 lb (79.4 kg), SpO2 96 %.   Temp  Av.8 °F (36.6 °C)  Min: 97.5 °F (36.4 °C)  Max: 98.2 °F (36.8 °C)  Pulse  Av.2  Min: 72  Max: 79  BP  Min: 106/59  Max: 172/55  SpO2  Av.8 %  Min: 96 %  Max: 98 %    24 hour I/O    Intake/Output Summary (Last 24 hours) at 2020 1205  Last data filed at 2020 0556  Gross per 24 hour   Intake 480 ml   Output 950 ml   Net -470 ml     Current Facility-Administered Medications   Medication Dose Route Frequency Provider Last Rate Last Dose    midodrine (PROAMATINE) tablet 5 mg  5 mg Oral TID  Teddy Martin MD   5 mg at 20 0827    atenolol (TENORMIN) tablet 25 mg  25 mg Oral Nightly Mancel Major, APRN - CNP   25 mg at 12/10/20 2155    cyclobenzaprine (FLEXERIL) tablet 10 mg  10 mg Oral TID PRN Dary Renae DO   10 mg at 20 1703    sodium chloride flush 0.9 % injection 10 mL  10 mL Intravenous 2 times per day Génesis Pa DO   10 mL at 20 0827    sodium chloride flush 0.9 % injection 10 mL  10 mL Intravenous PRN Dary Renae DO        sennosides-docusate sodium (SENOKOT-S) 8.6-50 MG tablet 1 tablet  1 tablet Oral BID Génesisjong Pa DO   1 tablet at 20 0827    magnesium hydroxide (MILK OF MAGNESIA) 400 MG/5ML suspension 30 mL  30 mL Oral Daily PRN Dary Renae DO   30 mL at 20    aspirin EC tablet 325 mg  325 mg Oral Daily [place] Muckle, DO   325 mg at 12/11/20 0827    clopidogrel (PLAVIX) tablet 75 mg  75 mg Oral Daily Deliah Smolder Renae, DO   75 mg at 12/11/20 0827    [Held by provider] lisinopril (PRINIVIL;ZESTRIL) tablet 2.5 mg  2.5 mg Oral Daily Deliah Smolder Renae, DO   2.5 mg at 12/01/20 1601    simvastatin (ZOCOR) tablet 20 mg  20 mg Oral Nightly Deliah Smolder Renae, DO   20 mg at 12/10/20 2202    sodium chloride flush 0.9 % injection 10 mL  10 mL Intravenous PRN Deliah Smolder Renae, DO        acetaminophen (TYLENOL) tablet 650 mg  650 mg Oral Q6H PRN [place] Muckle, DO   650 mg at 12/04/20 2025    Or    acetaminophen (TYLENOL) suppository 650 mg  650 mg Rectal Q6H PRN Priyank Muckle, DO        polyethylene glycol (GLYCOLAX) packet 17 g  17 g Oral Daily PRN Priyank Muckle, DO   17 g at 12/04/20 4389    promethazine (PHENERGAN) tablet 12.5 mg  12.5 mg Oral Q6H PRN Deliah Smolder Renae, DO        Or    ondansetron [facility]) injection 4 mg  4 mg Intravenous Q6H PRN Deliah Smolder Renae, DO        traMADol (ULTRAM) tablet 50 mg  50 mg Oral Q6H PRN Priyank Muckle, DO   50 mg at 12/08/20 1211    Or    traMADol (ULTRAM) tablet 100 mg  100 mg Oral Q6H PRN Deliah Smolder Renae, DO   100 mg at 11/30/20 8724       Objective:     Telemetry monitor: not on tele    Physical Exam:  Constitutional and general appearance: alert, cooperative, no distress and appears stated age  HEENT: PERRL, no cervical lymphadenopathy. No masses palpable.  Normal oral mucosa  Respiratory:  · Normal excursion and expansion without use of accessory muscles  · Resp auscultation: Normal breath sounds without wheezing, rhonchi, and rales  Cardiovascular:  · The apical impulse is not displaced  · Heart tones are crisp and normal. regular S1 and S2.  · Jugular venous pulsation Normal  · The carotid upstroke is normal in amplitude and contour without delay or bruit  · Peripheral pulses are symmetrical and full   Abdomen:  · No masses or tenderness  · Bowel sounds present  Extremities:  ·  No cyanosis or clubbing  ·  No lower extremity edema  ·  Skin: warm and dry  Neurological:  · Alert and oriented  · Moves all extremities well  · No abnormalities of mood, affect, memory, mentation, or behavior are noted    Data  EKG 11/29/2020:   SR HR 72    [facility] 2007 ([facility]):  Successful PTCA and subsequent deployment of a 2.75 x 20 mm Taxus drug eluting stent to a long Type C 80-90% proximal RCA lesion with less then 10% residual.   Successful PTCA and subsequent deployment of overlapping 2.5 x 16 mm Taxus drug eluting   stents to two serial 99% distal RCA lesions, which essentially comprises one lesion, which   is also a Type C lesion reducing it to less then 10% residual.   There were no complications. All labs and testing reviewed. Lab Review     Renal Profile:   Lab Results   Component Value Date    CREATININE 1.1 12/11/2020    BUN 33 12/11/2020     12/11/2020    K 4.3 12/11/2020    K 5.0 11/30/2020     12/11/2020    CO2 26 12/11/2020     CBC:    Lab Results   Component Value Date    WBC 9.2 12/11/2020    RBC 3.37 12/11/2020    HGB 11.0 12/11/2020    HCT 32.7 12/11/2020    MCV 96.9 12/11/2020    RDW 13.2 12/11/2020     12/11/2020     BNP:  No results found for: BNP  Fasting Lipid Panel:  No results found for: CHOL, HDL, TRIG  Cardiac Enzymes:  CK/MbTroponin  Lab Results   Component Value Date    TROPONINI <0.01 03/30/2018     PT/ INR   Lab Results   Component Value Date    INR 1.14 11/29/2020    PROTIME 13.3 11/29/2020     PTT No results found for: PTT No results found for: MG   Lab Results   Component Value Date    TSH 4.37 12/10/2020       Assessment:  Orthostatic hypotension: improved    -midodrine started 12/9/2020   -orthostatics improved today   HTN: controlled   CAD   -s/p PCI 2007  Right hip fracture: s/p repair 11/30/2020  Acute blood loss anemia: stable     Plan:   1. Continue midodrine, ASA, Plavix, and statin   2.

## 2020-12-14 ENCOUNTER — TELEPHONE (OUTPATIENT)
Dept: ORTHOPEDIC SURGERY | Age: 85
End: 2020-12-14

## 2020-12-15 ENCOUNTER — TELEPHONE (OUTPATIENT)
Dept: ORTHOPEDIC SURGERY | Age: 85
End: 2020-12-15

## 2021-01-07 ENCOUNTER — OFFICE VISIT (OUTPATIENT)
Dept: ORTHOPEDIC SURGERY | Age: 86
End: 2021-01-07

## 2021-01-07 VITALS — WEIGHT: 175 LBS | HEIGHT: 71 IN | BODY MASS INDEX: 24.5 KG/M2

## 2021-01-07 DIAGNOSIS — Z98.890 S/P ORIF (OPEN REDUCTION INTERNAL FIXATION) FRACTURE: Primary | ICD-10-CM

## 2021-01-07 DIAGNOSIS — Z87.81 S/P ORIF (OPEN REDUCTION INTERNAL FIXATION) FRACTURE: Primary | ICD-10-CM

## 2021-01-07 PROCEDURE — 99024 POSTOP FOLLOW-UP VISIT: CPT | Performed by: ORTHOPAEDIC SURGERY

## 2021-01-07 NOTE — PROGRESS NOTES
DIAGNOSIS:  Right intertrochanteric femur fracture status post intramedullary femoral nail     DATE OF SURGERY:  11/30/20 . HISTORY OF PRESENT ILLNESS: Mr. Corbin 80 y.o. male  who came in today for 6 weeks postoperative visit. The patient denies any significant pain in the right hip. He has been working on ROM and 25% WB with  PT. currently ambulating with a walker.,  He is living at home, his wife is with him at today's visit. no numbness or tingling sensation. No fever or Chills. PHYSICAL EXAMINATION:    Ht 5' 11\" (1.803 m)   Wt 175 lb (79.4 kg)   BMI 24.41 kg/m²     Pain Assessment:       Patient is awake, alert, and in no acute distress. The incision is healed well. No signs of any erythema or drainage. He has no pain with the active or passive range of motion of the right  hip. He has intact sensation to light touch distally, and is neurovascularly intact. IMAGING:  3 views right  Hip, and AP pelvis show anatomic alignment of the subtrochanteric femur fracture, hardware is in good position, no loosening, or hardware failure. IMPRESSION:    6 weeks status post right femoral nailing of subtrochanteric femur fracture       Diagnosis Orders   1. S/P ORIF (open reduction internal fixation) fracture  XR HIP 2-3 VW W PELVIS RIGHT       PLAN:    I have told the patient to work on ROM with  PT, 50% WB as well as strengthening exercises. The patient will come back for a follow up in 8 weeks. At that time, we will take Two views right hip, and AP pelvis. As this patient has demonstrated risk factors for osteoporosis, such as age greater than [de-identified] years and evidence of a fracture, I have referred the patient back to the primary care physician for evaluation for osteoporosis, including consideration for DEXA scanning, if this is felt to be clinically indicated. The patient is advised to contact the primary care physician to follow-up for further evaluation.          Mynor Dalal dictation was performed with a verbal recognition program (DRAGON) and it was checked for errors. It is possible that there are still dictated errors within this office note. If so, please bring any errors to my attention for an addendum. All efforts were made to ensure that this office note is accurate.

## 2022-03-22 ENCOUNTER — INITIAL CONSULT (OUTPATIENT)
Dept: SURGERY | Age: 87
End: 2022-03-22
Payer: MEDICARE

## 2022-03-22 VITALS
BODY MASS INDEX: 24.64 KG/M2 | DIASTOLIC BLOOD PRESSURE: 68 MMHG | HEART RATE: 66 BPM | SYSTOLIC BLOOD PRESSURE: 149 MMHG | WEIGHT: 176 LBS | HEIGHT: 71 IN | TEMPERATURE: 97.5 F

## 2022-03-22 DIAGNOSIS — K40.90 NON-RECURRENT UNILATERAL INGUINAL HERNIA WITHOUT OBSTRUCTION OR GANGRENE: Primary | ICD-10-CM

## 2022-03-22 PROCEDURE — 4040F PNEUMOC VAC/ADMIN/RCVD: CPT | Performed by: SURGERY

## 2022-03-22 PROCEDURE — G8427 DOCREV CUR MEDS BY ELIG CLIN: HCPCS | Performed by: SURGERY

## 2022-03-22 PROCEDURE — 1036F TOBACCO NON-USER: CPT | Performed by: SURGERY

## 2022-03-22 PROCEDURE — 1123F ACP DISCUSS/DSCN MKR DOCD: CPT | Performed by: SURGERY

## 2022-03-22 PROCEDURE — G8484 FLU IMMUNIZE NO ADMIN: HCPCS | Performed by: SURGERY

## 2022-03-22 PROCEDURE — G8420 CALC BMI NORM PARAMETERS: HCPCS | Performed by: SURGERY

## 2022-03-22 PROCEDURE — 99204 OFFICE O/P NEW MOD 45 MIN: CPT | Performed by: SURGERY

## 2022-03-22 RX ORDER — SODIUM CHLORIDE 0.9 % (FLUSH) 0.9 %
5-40 SYRINGE (ML) INJECTION PRN
Status: CANCELLED | OUTPATIENT
Start: 2022-03-22

## 2022-03-22 RX ORDER — SODIUM CHLORIDE 0.9 % (FLUSH) 0.9 %
5-40 SYRINGE (ML) INJECTION EVERY 12 HOURS SCHEDULED
Status: CANCELLED | OUTPATIENT
Start: 2022-03-22

## 2022-03-22 RX ORDER — SODIUM CHLORIDE 9 MG/ML
25 INJECTION, SOLUTION INTRAVENOUS PRN
Status: CANCELLED | OUTPATIENT
Start: 2022-03-22

## 2022-03-22 NOTE — PROGRESS NOTES
New Patient [address] Surgery Clari Crenshaw, [address], [address], [address]  Phone: [phone]  Fax: [phone]    Marianna Corbin   YOB: 1929    Date of Visit:  3/22/2022    No ref. provider found  Almita Koch MD    HPI:   Inguinal Hernia: Patient is 80y.o. year old male seen at request of Almita Koch MD.  Patient presents for evaluation of right right inguinal hernia. Symptoms were first noted several years ago. Pain is dull, intermittent, radiating to groin. Lump is reducible. Pt has no symptoms of  chronic constipation, chronic cough, difficulty urinating. Pt. does not have previous history of prior  Inguinal bilateral hernia surgery. Pt lives alone and remains very active physically with chores, jobs on his property, he walks regularly, and notes the hernia becoming more bothersome with these assorted activities. He does not smoke.     No Known Allergies  Outpatient Medications Marked as Taking for the 3/22/22 encounter (Initial consult) with Teodoro Fajardo MD   Medication Sig Dispense Refill    midodrine (PROAMATINE) 5 MG tablet Take 1 tablet by mouth 3 times daily (with meals) Give at 8am, 12pm, 4pm, Do not give after 1800 or within 4 hrs of bedtime 90 tablet 3    atenolol (TENORMIN) 25 MG tablet Take 1 tablet by mouth nightly 30 tablet 0    sennosides-docusate sodium (SENOKOT-S) 8.6-50 MG tablet Take 1 tablet by mouth 2 times daily      clopidogrel (PLAVIX) 75 MG tablet Take 75 mg by mouth daily      simvastatin (ZOCOR) 20 MG tablet Take 20 mg by mouth nightly         Past Medical History:   Diagnosis Date    Cancer [facility])     Bladder CA status post chemo/radiation 2018    Coronary artery disease     PCI 2007    CVA [facility]cerebral vascular accident) ([facility] [address] 2002    Hyperlipidemia     Hypertension     Prostate cancer [facility])      Past Surgical History:   Procedure Laterality Date    FEMUR FRACTURE SURGERY Right 11/30/2020    FEMUR INTRAMEDULLARY NAIL BRITTNEY INSERTION RIGHT performed by Kenny Ross DO [facility]     No family history on file. Social History     Socioeconomic History    Marital status:      Spouse name: Not on file    Number of children: Not on file    Years of education: Not on file    Highest education level: Not on file   Occupational History    Not on file   Tobacco Use    Smoking status: Never Smoker    Smokeless tobacco: Never Used   Substance and Sexual Activity    Alcohol use: No    Drug use: No    Sexual activity: Not on file   Other Topics Concern    Not on file   Social History Narrative    Not on file     Social Determinants of Health     Financial Resource Strain:     Difficulty of Paying Living Expenses: Not on file   Food Insecurity:     Worried About Running Out of Food in the Last Year: Not on file    [place]ta of Food in the Last Year: Not on file   Transportation Needs:     Lack of Transportation (Medical): Not on file    Lack of Transportation (Non-Medical):  Not on file   Physical Activity:     Days of Exercise per Week: Not on file    Minutes of Exercise per Session: Not on file   Stress:     Feeling of Stress : Not on file   Social Connections:     Frequency of Communication with Friends and Family: Not on file    Frequency of Social Gatherings with Friends and Family: Not on file    Attends Hindu Services: Not on file    Active Member of [address] [place] [address] or Organizations: Not on file    Attends Club or Organization Meetings: Not on file    Marital Status: Not on file   Intimate Partner Violence:     Fear of Current or Ex-Partner: Not on file    Emotionally Abused: Not on file    Physically Abused: Not on file    Sexually Abused: Not on file   Housing Stability:     Unable to Pay for Housing in the Last Year: Not on file    Number of [place] in the Last Year: Not on file    Unstable Housing in the Last Year: Not on file A review of the patient's record including allergies, medication list, tobacco history, family history, problem list, medical history and social history has been completed and updates made to the patient's EMR where indicated. Vitals:    03/22/22 1502   BP: (!) 149/68   Site: Left Wrist   Position: Sitting   Cuff Size: Medium Adult   Pulse: 66   Temp: 97.5 °F (36.4 °C)   Weight: 176 lb (79.8 kg)   Height: 5' 10.98\" (1.803 m)     Body mass index is 24.56 kg/m². Wt Readings from Last 3 Encounters:   03/22/22 176 lb (79.8 kg)   01/07/21 175 lb (79.4 kg)   11/29/20 175 lb (79.4 kg)     BP Readings from Last 3 Encounters:   03/22/22 (!) 149/68   12/11/20 127/71   11/30/20 (!) 105/55          REVIEW OF SYSTEMS:   · All other systems reviewed; please refer to HPI with pertinent positives, all other ROS are negative    PHYSICAL EXAM:    CONSTITUTIONAL:  awake, alert, no apparent distress and normal weight  ENT:  normocepalic, without obvious abnormality  NECK:  supple, symmetrical, trachea midline   LUNGS:  Resp easy and unlabored  CARDIOVASCULAR:     ABDOMEN:  no scars,  , soft, non-distended, non-tender, involuntary guarding absent,  Examination for hernias: right inguinal hernia, reducible, nontender, no sx of left inguinal hernia when upright w/ Valsalva. MUSCULOSKELETAL: No edema  NEUROLOGIC:  Mental Status Exam:  Level of Alertness:   awake  Orientation:   person, place, time      DATA:       ASSESSMENT:     Diagnosis Orders   1. Non-recurrent unilateral inguinal hernia without obstruction or gangrene       While his age might make one pause to offer elective surgery for a hernia, I think his ongoing level of physical activities (and the increasing discomfort in the hernia area when working) justify our proceeding with a repair. PLAN:    We will schedule the pt for  robotic preperitoneal right inguinal hernia repair.  The technical aspects, risks, benefits and complications of the procedure were discussed with the patient. The pt appears to understand, asks appropriate questions, and agrees to proceed with the procedure.        Blas Kaba MD

## 2022-03-23 ENCOUNTER — TELEPHONE (OUTPATIENT)
Dept: SURGERY | Age: 87
End: 2022-03-23

## 2022-03-23 NOTE — TELEPHONE ENCOUNTER
Pt saw Dr Ashlie Moeller in the office 3/22/22 and was given surgery instructions for a Robotic Right Inguinal Hernia Repair with Mesh, Possible Open Procedure (General Anes) scheduled 4/20/22 @ 1:30pm arrival 11:30am [facility] - NPO after midnight jluis b/f surgery - Pt will need  day of surgery - Dr Meño Villavicencio to complete pre-op physical - Cardiac Clearance request faxed to Dr Charisse Morgan on 3/23/22 - see media (pt currently taking Plavix) - Pt understood and agreed w/ above noted

## 2022-03-24 NOTE — PROGRESS NOTES
Alexander Arroyo Few    Age 80 y.o.    male    12/12/1929    MRN [id number]      4/20/2022  Arrival Time_____________  OR Time____________[address]     Procedure(s):  ROBOTIC RIGHT INGUINAL HERNIA REPAIR WITH MESH, POSSIBLE OPEN PROCEDURE                      General     Surgeon(s):  Chloe Castro, MD      DAY ADMIT ___  SDS/OP ___  OUTPT IN BED ___      Phone [phone] (home) [phone] (work)    PCP _____________________ Phone_________________ [address] ( ) Epic CE ( ) Appt ________    ADDITIONAL INFO __________________________________ Cardio/Consult _____________    NOTES _____________________________________________________________________    ____________________________________________________________________________    PAT APPT DATE:________ TIME: ________  FAXED QAD: _______  (__) H&P w/ Hospitalist  ____________________________________________________________________________  NURSING HISTORY COMPLETE: ______  (__) CBC       (__) W/ DIFF ___________         (__) Hgb A1C    ___________  (__) CHEST X RAY   __________  (__) LIPID PROFILE  ___________  (__) EKG   __________  (__) PT/PTT   ___________  (__) PFT's   __________  (__) BMP   ___________  (__) CAROTIDS  __________  (__) CMP   ___________  (__) VEIN MAPPING  __________  (__) U/A   ___________  (__) HISTORY & PHYSICAL __________  (__) URINE C & S  ___________  (__) CARDIAC CLEARANCE __________  (__) U/A W/ FLEX  ___________  (__) PULM.  CLEARANCE __________  (__) SERUM PREGNANCY ___________  (__) Check Epic DOS orders __________  (__) TYPE & SCREEN ________ repeat ( ) (__)  __________________ __________  (__) ALBUMIN Lauraine [place] ___________  (__)  __________________ __________  (__) TRANSFERRIN  ___________  (__)  __________________ __________  (__) LIVER PROFILE  ___________  (__)  __________________ __________  (__) MRSA NASAL SWAB ___________  (__) URINE PREG DOS __________  (__) SED RATE  ___________  (__) BLOOD SUGAR DOS __________  (__) C-REACTIVE PROTEIN ___________    (__) VITAMIN D HYDROXY ___________  (__) BLOOD THINNERS __________    (__) ACE/ ARBS: _____________________    (__) BETABLOCKERS ___________________

## 2022-04-18 NOTE — PROGRESS NOTES
1. Do not eat or drink anything after 12 midnight prior to surgery. This includes no water, chewing gum mints, or ice chips. You may brush your teeth and gargle the day of surgery but DO NOT SWALLOW THE WATER. 2. Please see your family doctor/pediatrician for a history and physical and/or concerning medications. Bring any test results/reports from your physician's office. If you are under the care of a heart doctor or specialist please be aware that you may be asked to see him or her for clearance. 3. You may be asked to stop blood thinners such as Coumadin, Plavix, Fragmin, and Lovenox or Anti-inflammatories such as Aspirin, Ibuprofen, Advil, and Naproxen prior to your surgery. Please check with your doctor before stopping these or any other medications. 4. Do not smoke, and do not drink any alcoholic beverages 24 hours prior to surgery. 5. You MUST make arrangements for a responsible adult to take you home after your surgery. For your safety, you will not be allowed to leave alone or drive yourself home. Your surgery will be cancelled if you do not have a ride home. Also for your safety, it is strongly suggested someone stay with you the first 24 hrs after your surgery. 6. A parent/legal guardian must accompany a child scheduled for surgery and plan to stay at the hospital until the child is discharged. Please do not bring other children with you. 7. For your comfort,please wear simple, loose fitting clothing to the hospital.  Please do not bring valuables (money, credit cards, checkbooks, etc.) Do not wear any makeup (including no eye makeup) or nail polish on your fingers or toes. 8. For your safety, please DO NOT wear any jewelry or piercings on day of surgery. All body piercing jewelry must be removed. 9. If you have dentures, they will be removed before going to the OR; for your convenience we will provide you with a container.   If you wear contact lenses or glasses, they will be removed, they will be removed, please bring a case for them. 10. If appicable,Please see your family doctor/pediatrician for a history & physical and/or concerning medications. Bring any test results/reports from your physician's office. 11. Remember to bring Blood Bank bracelet to the hospital on the day of surgery. 12. If you have a Living Will and Durable Power of  for Healthcare, please bring in a copy. 15. Notify your Surgeon if you develop any illness between now and surgery  time, cough, cold, fever, sore throat, nausea, vomiting, etc.  Please notify your surgeon if you experience dizziness, shortness of breath or blurred vision between now & the time of your surgery   14. DO NOT shave your operative site 96 hours prior to surgery. For face & neck surgery, men may use an electric razor 48 hours prior to surgery. 15. Shower the night before surgery with _X__Antibacterial soap ___Hibiclens   16. To provide excellent care visitors will be limited to one in the room at any given time. 17.  Please bring picture ID and insurance card. 18.  Visit our web site for additional information:  [employer]/surgery.           INSTRUCTED TO TAKE ATENOLOL AM OF SURGERY WITH SMALL SIP OF WATER  PATIENT HAS STOPPED PLAVIX PER PCP/SURGEON INSTRUCTIONS

## 2022-04-19 ENCOUNTER — ANESTHESIA EVENT (OUTPATIENT)
Dept: OPERATING ROOM | Age: 87
End: 2022-04-19
Payer: MEDICARE

## 2022-04-20 ENCOUNTER — HOSPITAL ENCOUNTER (OUTPATIENT)
Age: 87
Setting detail: OUTPATIENT SURGERY
Discharge: HOME | End: 2022-04-20
Attending: SURGERY | Admitting: SURGERY
Payer: MEDICARE

## 2022-04-20 ENCOUNTER — ANESTHESIA (OUTPATIENT)
Dept: OPERATING ROOM | Age: 87
End: 2022-04-20
Payer: MEDICARE

## 2022-04-20 VITALS
RESPIRATION RATE: 2 BRPM | DIASTOLIC BLOOD PRESSURE: 69 MMHG | OXYGEN SATURATION: 99 % | SYSTOLIC BLOOD PRESSURE: 149 MMHG | TEMPERATURE: 97.3 F

## 2022-04-20 VITALS
TEMPERATURE: 96.9 F | WEIGHT: 174 LBS | OXYGEN SATURATION: 97 % | SYSTOLIC BLOOD PRESSURE: 177 MMHG | HEIGHT: 71 IN | HEART RATE: 67 BPM | BODY MASS INDEX: 24.36 KG/M2 | DIASTOLIC BLOOD PRESSURE: 63 MMHG | RESPIRATION RATE: 13 BRPM

## 2022-04-20 DIAGNOSIS — G89.18 POSTOPERATIVE PAIN: Primary | ICD-10-CM

## 2022-04-20 LAB
ANION GAP SERPL CALCULATED.3IONS-SCNC: 12 MMOL/L (ref 3–16)
BUN BLDV-MCNC: 29 MG/DL (ref 7–20)
CALCIUM SERPL-MCNC: 9.1 MG/DL (ref 8.3–10.6)
CHLORIDE BLD-SCNC: 101 MMOL/L (ref 99–110)
CO2: 24 MMOL/L (ref 21–32)
CREAT SERPL-MCNC: 1.5 MG/DL (ref 0.8–1.3)
EKG ATRIAL RATE: 73 BPM
EKG DIAGNOSIS: NORMAL
EKG P AXIS: 90 DEGREES
EKG P-R INTERVAL: 236 MS
EKG Q-T INTERVAL: 440 MS
EKG QRS DURATION: 96 MS
EKG QTC CALCULATION (BAZETT): 484 MS
EKG R AXIS: 19 DEGREES
EKG T AXIS: 89 DEGREES
EKG VENTRICULAR RATE: 73 BPM
GFR AFRICAN AMERICAN: 53
GFR NON-AFRICAN AMERICAN: 44
GLUCOSE BLD-MCNC: 109 MG/DL (ref 70–99)
POTASSIUM REFLEX MAGNESIUM: 4 MMOL/L (ref 3.5–5.1)
SODIUM BLD-SCNC: 137 MMOL/L (ref 136–145)

## 2022-04-20 PROCEDURE — 49650 LAP ING HERNIA REPAIR INIT: CPT | Performed by: SURGERY

## 2022-04-20 PROCEDURE — 2500000003 HC RX 250 WO HCPCS: Performed by: ANESTHESIOLOGY

## 2022-04-20 PROCEDURE — 6360000002 HC RX W HCPCS: Performed by: ANESTHESIOLOGY

## 2022-04-20 PROCEDURE — 2500000003 HC RX 250 WO HCPCS

## 2022-04-20 PROCEDURE — 80048 BASIC METABOLIC PNL TOTAL CA: CPT

## 2022-04-20 PROCEDURE — 6360000002 HC RX W HCPCS

## 2022-04-20 PROCEDURE — S2900 ROBOTIC SURGICAL SYSTEM: HCPCS | Performed by: SURGERY

## 2022-04-20 PROCEDURE — 2500000003 HC RX 250 WO HCPCS: Performed by: SURGERY

## 2022-04-20 PROCEDURE — 2709999900 HC NON-CHARGEABLE SUPPLY: Performed by: SURGERY

## 2022-04-20 PROCEDURE — 93010 ELECTROCARDIOGRAM REPORT: CPT | Performed by: INTERNAL MEDICINE

## 2022-04-20 PROCEDURE — 2580000003 HC RX 258

## 2022-04-20 PROCEDURE — 3700000000 HC ANESTHESIA ATTENDED CARE: Performed by: SURGERY

## 2022-04-20 PROCEDURE — 7100000011 HC PHASE II RECOVERY - ADDTL 15 MIN: Performed by: SURGERY

## 2022-04-20 PROCEDURE — 3700000001 HC ADD 15 MINUTES (ANESTHESIA): Performed by: SURGERY

## 2022-04-20 PROCEDURE — 7100000010 HC PHASE II RECOVERY - FIRST 15 MIN: Performed by: SURGERY

## 2022-04-20 PROCEDURE — 3600000009 HC SURGERY ROBOT BASE: Performed by: SURGERY

## 2022-04-20 PROCEDURE — 93005 ELECTROCARDIOGRAM TRACING: CPT | Performed by: ANESTHESIOLOGY

## 2022-04-20 PROCEDURE — 2500000003 HC RX 250 WO HCPCS: Performed by: NURSE ANESTHETIST, CERTIFIED REGISTERED

## 2022-04-20 PROCEDURE — 3600000019 HC SURGERY ROBOT ADDTL 15MIN: Performed by: SURGERY

## 2022-04-20 PROCEDURE — 7100000000 HC PACU RECOVERY - FIRST 15 MIN: Performed by: SURGERY

## 2022-04-20 PROCEDURE — C1781 MESH (IMPLANTABLE): HCPCS | Performed by: SURGERY

## 2022-04-20 PROCEDURE — 7100000001 HC PACU RECOVERY - ADDTL 15 MIN: Performed by: SURGERY

## 2022-04-20 DEVICE — MESH HERN XL W5XL7IN R INGUINAL POLYPR REP CRV SHP SEAL: Type: IMPLANTABLE DEVICE | Site: INGUINAL | Status: FUNCTIONAL

## 2022-04-20 RX ORDER — ONDANSETRON 2 MG/ML
INJECTION INTRAMUSCULAR; INTRAVENOUS PRN
Status: DISCONTINUED | OUTPATIENT
Start: 2022-04-20 | End: 2022-04-20 | Stop reason: SDUPTHER

## 2022-04-20 RX ORDER — SODIUM CHLORIDE 0.9 % (FLUSH) 0.9 %
5-40 SYRINGE (ML) INJECTION EVERY 12 HOURS SCHEDULED
Status: DISCONTINUED | OUTPATIENT
Start: 2022-04-20 | End: 2022-04-20 | Stop reason: HOSPADM

## 2022-04-20 RX ORDER — PHENYLEPHRINE HCL IN 0.9% NACL 1 MG/10 ML
SYRINGE (ML) INTRAVENOUS PRN
Status: DISCONTINUED | OUTPATIENT
Start: 2022-04-20 | End: 2022-04-20 | Stop reason: SDUPTHER

## 2022-04-20 RX ORDER — SODIUM CHLORIDE 9 MG/ML
25 INJECTION, SOLUTION INTRAVENOUS PRN
Status: DISCONTINUED | OUTPATIENT
Start: 2022-04-20 | End: 2022-04-20 | Stop reason: HOSPADM

## 2022-04-20 RX ORDER — ONDANSETRON 2 MG/ML
4 INJECTION INTRAMUSCULAR; INTRAVENOUS
Status: DISCONTINUED | OUTPATIENT
Start: 2022-04-20 | End: 2022-04-20 | Stop reason: HOSPADM

## 2022-04-20 RX ORDER — ROCURONIUM BROMIDE 10 MG/ML
INJECTION, SOLUTION INTRAVENOUS PRN
Status: DISCONTINUED | OUTPATIENT
Start: 2022-04-20 | End: 2022-04-20 | Stop reason: SDUPTHER

## 2022-04-20 RX ORDER — PROPOFOL 10 MG/ML
INJECTION, EMULSION INTRAVENOUS PRN
Status: DISCONTINUED | OUTPATIENT
Start: 2022-04-20 | End: 2022-04-20 | Stop reason: SDUPTHER

## 2022-04-20 RX ORDER — OXYCODONE HYDROCHLORIDE 5 MG/1
10 TABLET ORAL PRN
Status: DISCONTINUED | OUTPATIENT
Start: 2022-04-20 | End: 2022-04-20 | Stop reason: HOSPADM

## 2022-04-20 RX ORDER — DEXAMETHASONE SODIUM PHOSPHATE 4 MG/ML
INJECTION, SOLUTION INTRA-ARTICULAR; INTRALESIONAL; INTRAMUSCULAR; INTRAVENOUS; SOFT TISSUE PRN
Status: DISCONTINUED | OUTPATIENT
Start: 2022-04-20 | End: 2022-04-20 | Stop reason: SDUPTHER

## 2022-04-20 RX ORDER — SODIUM CHLORIDE, SODIUM LACTATE, POTASSIUM CHLORIDE, CALCIUM CHLORIDE 600; 310; 30; 20 MG/100ML; MG/100ML; MG/100ML; MG/100ML
INJECTION, SOLUTION INTRAVENOUS CONTINUOUS
Status: DISCONTINUED | OUTPATIENT
Start: 2022-04-20 | End: 2022-04-20 | Stop reason: HOSPADM

## 2022-04-20 RX ORDER — DIPHENHYDRAMINE HYDROCHLORIDE 50 MG/ML
12.5 INJECTION INTRAMUSCULAR; INTRAVENOUS
Status: DISCONTINUED | OUTPATIENT
Start: 2022-04-20 | End: 2022-04-20 | Stop reason: HOSPADM

## 2022-04-20 RX ORDER — SODIUM CHLORIDE 0.9 % (FLUSH) 0.9 %
5-40 SYRINGE (ML) INJECTION PRN
Status: DISCONTINUED | OUTPATIENT
Start: 2022-04-20 | End: 2022-04-20 | Stop reason: HOSPADM

## 2022-04-20 RX ORDER — LIDOCAINE HYDROCHLORIDE 10 MG/ML
1 INJECTION, SOLUTION EPIDURAL; INFILTRATION; INTRACAUDAL; PERINEURAL
Status: DISCONTINUED | OUTPATIENT
Start: 2022-04-20 | End: 2022-04-20 | Stop reason: HOSPADM

## 2022-04-20 RX ORDER — LABETALOL HYDROCHLORIDE 5 MG/ML
INJECTION, SOLUTION INTRAVENOUS PRN
Status: DISCONTINUED | OUTPATIENT
Start: 2022-04-20 | End: 2022-04-20 | Stop reason: SDUPTHER

## 2022-04-20 RX ORDER — BUPIVACAINE HYDROCHLORIDE 5 MG/ML
INJECTION, SOLUTION EPIDURAL; INTRACAUDAL PRN
Status: DISCONTINUED | OUTPATIENT
Start: 2022-04-20 | End: 2022-04-20 | Stop reason: HOSPADM

## 2022-04-20 RX ORDER — LABETALOL HYDROCHLORIDE 5 MG/ML
5 INJECTION, SOLUTION INTRAVENOUS EVERY 10 MIN PRN
Status: DISCONTINUED | OUTPATIENT
Start: 2022-04-20 | End: 2022-04-20 | Stop reason: HOSPADM

## 2022-04-20 RX ORDER — OXYCODONE HYDROCHLORIDE 5 MG/1
5 TABLET ORAL PRN
Status: DISCONTINUED | OUTPATIENT
Start: 2022-04-20 | End: 2022-04-20 | Stop reason: HOSPADM

## 2022-04-20 RX ORDER — SODIUM CHLORIDE 0.9 % (FLUSH) 0.9 %
10 SYRINGE (ML) INJECTION PRN
Status: DISCONTINUED | OUTPATIENT
Start: 2022-04-20 | End: 2022-04-20 | Stop reason: HOSPADM

## 2022-04-20 RX ORDER — SODIUM CHLORIDE, SODIUM LACTATE, POTASSIUM CHLORIDE, CALCIUM CHLORIDE 600; 310; 30; 20 MG/100ML; MG/100ML; MG/100ML; MG/100ML
INJECTION, SOLUTION INTRAVENOUS CONTINUOUS PRN
Status: DISCONTINUED | OUTPATIENT
Start: 2022-04-20 | End: 2022-04-20 | Stop reason: SDUPTHER

## 2022-04-20 RX ORDER — OXYCODONE HYDROCHLORIDE AND ACETAMINOPHEN 5; 325 MG/1; MG/1
1 TABLET ORAL EVERY 4 HOURS PRN
Qty: 12 TABLET | Refills: 0 | Status: SHIPPED | OUTPATIENT
Start: 2022-04-20 | End: 2022-04-23

## 2022-04-20 RX ORDER — SODIUM CHLORIDE 0.9 % (FLUSH) 0.9 %
10 SYRINGE (ML) INJECTION EVERY 12 HOURS SCHEDULED
Status: DISCONTINUED | OUTPATIENT
Start: 2022-04-20 | End: 2022-04-20 | Stop reason: HOSPADM

## 2022-04-20 RX ORDER — FENTANYL CITRATE 50 UG/ML
INJECTION, SOLUTION INTRAMUSCULAR; INTRAVENOUS PRN
Status: DISCONTINUED | OUTPATIENT
Start: 2022-04-20 | End: 2022-04-20 | Stop reason: SDUPTHER

## 2022-04-20 RX ORDER — SODIUM CHLORIDE 9 MG/ML
INJECTION, SOLUTION INTRAVENOUS PRN
Status: DISCONTINUED | OUTPATIENT
Start: 2022-04-20 | End: 2022-04-20 | Stop reason: HOSPADM

## 2022-04-20 RX ADMIN — FENTANYL CITRATE 25 MCG: 50 INJECTION INTRAMUSCULAR; INTRAVENOUS at 14:19

## 2022-04-20 RX ADMIN — PROPOFOL 50 MG: 10 INJECTION, EMULSION INTRAVENOUS at 14:29

## 2022-04-20 RX ADMIN — PROPOFOL 80 MG: 10 INJECTION, EMULSION INTRAVENOUS at 14:19

## 2022-04-20 RX ADMIN — HYDROMORPHONE HYDROCHLORIDE 0.5 MG: 1 INJECTION, SOLUTION INTRAMUSCULAR; INTRAVENOUS; SUBCUTANEOUS at 17:18

## 2022-04-20 RX ADMIN — LABETALOL HYDROCHLORIDE 10 MG: 5 INJECTION, SOLUTION INTRAVENOUS at 16:08

## 2022-04-20 RX ADMIN — LABETALOL HYDROCHLORIDE 5 MG: 5 INJECTION INTRAVENOUS at 16:21

## 2022-04-20 RX ADMIN — SODIUM CHLORIDE, SODIUM LACTATE, POTASSIUM CHLORIDE, AND CALCIUM CHLORIDE: .6; .31; .03; .02 INJECTION, SOLUTION INTRAVENOUS at 15:45

## 2022-04-20 RX ADMIN — ROCURONIUM BROMIDE 50 MG: 10 SOLUTION INTRAVENOUS at 14:19

## 2022-04-20 RX ADMIN — SUGAMMADEX 100 MG: 100 INJECTION, SOLUTION INTRAVENOUS at 15:53

## 2022-04-20 RX ADMIN — Medication 100 MCG: at 14:33

## 2022-04-20 RX ADMIN — SODIUM CHLORIDE, SODIUM LACTATE, POTASSIUM CHLORIDE, AND CALCIUM CHLORIDE: .6; .31; .03; .02 INJECTION, SOLUTION INTRAVENOUS at 14:12

## 2022-04-20 RX ADMIN — PROPOFOL 20 MG: 10 INJECTION, EMULSION INTRAVENOUS at 15:46

## 2022-04-20 RX ADMIN — FENTANYL CITRATE 50 MCG: 50 INJECTION INTRAMUSCULAR; INTRAVENOUS at 14:41

## 2022-04-20 RX ADMIN — ROCURONIUM BROMIDE 10 MG: 10 SOLUTION INTRAVENOUS at 14:50

## 2022-04-20 RX ADMIN — PROPOFOL 50 MG: 10 INJECTION, EMULSION INTRAVENOUS at 14:25

## 2022-04-20 RX ADMIN — HYDROMORPHONE HYDROCHLORIDE 0.5 MG: 1 INJECTION, SOLUTION INTRAMUSCULAR; INTRAVENOUS; SUBCUTANEOUS at 16:25

## 2022-04-20 RX ADMIN — Medication 50 MCG: at 14:59

## 2022-04-20 RX ADMIN — DEXAMETHASONE SODIUM PHOSPHATE 8 MG: 4 INJECTION, SOLUTION INTRAMUSCULAR; INTRAVENOUS at 14:31

## 2022-04-20 RX ADMIN — FENTANYL CITRATE 25 MCG: 50 INJECTION INTRAMUSCULAR; INTRAVENOUS at 15:41

## 2022-04-20 RX ADMIN — ONDANSETRON 4 MG: 2 INJECTION INTRAMUSCULAR; INTRAVENOUS at 14:19

## 2022-04-20 RX ADMIN — LIDOCAINE HYDROCHLORIDE 60 MG: 10 INJECTION, SOLUTION INFILTRATION; PERINEURAL at 14:19

## 2022-04-20 RX ADMIN — ROCURONIUM BROMIDE 10 MG: 10 SOLUTION INTRAVENOUS at 15:23

## 2022-04-20 RX ADMIN — CEFAZOLIN SODIUM 2 G: 10 INJECTION, POWDER, FOR SOLUTION INTRAVENOUS at 14:15

## 2022-04-20 RX ADMIN — SUGAMMADEX 100 MG: 100 INJECTION, SOLUTION INTRAVENOUS at 15:51

## 2022-04-20 RX ADMIN — Medication 50 MCG: at 15:14

## 2022-04-20 RX ADMIN — Medication 100 MCG: at 14:51

## 2022-04-20 ASSESSMENT — PULMONARY FUNCTION TESTS
PIF_VALUE: 23
PIF_VALUE: 20
PIF_VALUE: 22
PIF_VALUE: 22
PIF_VALUE: 19
PIF_VALUE: 23
PIF_VALUE: 12
PIF_VALUE: 13
PIF_VALUE: 23
PIF_VALUE: 23
PIF_VALUE: 20
PIF_VALUE: 22
PIF_VALUE: 18
PIF_VALUE: 3
PIF_VALUE: 20
PIF_VALUE: 16
PIF_VALUE: 23
PIF_VALUE: 19
PIF_VALUE: 16
PIF_VALUE: 23
PIF_VALUE: 20
PIF_VALUE: 24
PIF_VALUE: 20
PIF_VALUE: 20
PIF_VALUE: 23
PIF_VALUE: 23
PIF_VALUE: 18
PIF_VALUE: 23
PIF_VALUE: 20
PIF_VALUE: 23
PIF_VALUE: 2
PIF_VALUE: 16
PIF_VALUE: 22
PIF_VALUE: 12
PIF_VALUE: 20
PIF_VALUE: 1
PIF_VALUE: 20
PIF_VALUE: 13
PIF_VALUE: 2
PIF_VALUE: 23
PIF_VALUE: 20
PIF_VALUE: 12
PIF_VALUE: 4
PIF_VALUE: 1
PIF_VALUE: 20
PIF_VALUE: 1
PIF_VALUE: 22
PIF_VALUE: 20
PIF_VALUE: 20
PIF_VALUE: 12
PIF_VALUE: 11
PIF_VALUE: 1
PIF_VALUE: 19
PIF_VALUE: 0
PIF_VALUE: 3
PIF_VALUE: 12
PIF_VALUE: 23
PIF_VALUE: 20
PIF_VALUE: 17
PIF_VALUE: 17
PIF_VALUE: 15
PIF_VALUE: 22
PIF_VALUE: 1
PIF_VALUE: 20
PIF_VALUE: 22
PIF_VALUE: 24
PIF_VALUE: 15
PIF_VALUE: 20
PIF_VALUE: 19
PIF_VALUE: 22
PIF_VALUE: 23
PIF_VALUE: 15
PIF_VALUE: 15
PIF_VALUE: 22
PIF_VALUE: 23
PIF_VALUE: 13
PIF_VALUE: 15
PIF_VALUE: 20
PIF_VALUE: 20
PIF_VALUE: 23
PIF_VALUE: 23
PIF_VALUE: 3
PIF_VALUE: 18
PIF_VALUE: 1
PIF_VALUE: 20
PIF_VALUE: 2
PIF_VALUE: 19
PIF_VALUE: 19
PIF_VALUE: 20
PIF_VALUE: 20
PIF_VALUE: 15
PIF_VALUE: 15
PIF_VALUE: 1
PIF_VALUE: 21
PIF_VALUE: 22
PIF_VALUE: 22
PIF_VALUE: 12
PIF_VALUE: 20
PIF_VALUE: 15
PIF_VALUE: 24
PIF_VALUE: 18
PIF_VALUE: 23
PIF_VALUE: 23

## 2022-04-20 ASSESSMENT — PAIN DESCRIPTION - ORIENTATION: ORIENTATION: MID

## 2022-04-20 ASSESSMENT — PAIN DESCRIPTION - FREQUENCY: FREQUENCY: CONTINUOUS

## 2022-04-20 ASSESSMENT — PAIN DESCRIPTION - PAIN TYPE: TYPE: ACUTE PAIN;SURGICAL PAIN

## 2022-04-20 ASSESSMENT — PAIN DESCRIPTION - ONSET: ONSET: AWAKENED FROM SLEEP

## 2022-04-20 ASSESSMENT — PAIN - FUNCTIONAL ASSESSMENT
PAIN_FUNCTIONAL_ASSESSMENT: NONE - DENIES PAIN
PAIN_FUNCTIONAL_ASSESSMENT: ACTIVITIES ARE NOT PREVENTED
PAIN_FUNCTIONAL_ASSESSMENT: NONE - DENIES PAIN

## 2022-04-20 ASSESSMENT — PAIN DESCRIPTION - DESCRIPTORS: DESCRIPTORS: ACHING

## 2022-04-20 ASSESSMENT — PAIN DESCRIPTION - LOCATION: LOCATION: ABDOMEN

## 2022-04-20 ASSESSMENT — PAIN SCALES - GENERAL
PAINLEVEL_OUTOF10: 7
PAINLEVEL_OUTOF10: 8

## 2022-04-20 NOTE — ANESTHESIA PRE PROCEDURE
Department of Anesthesiology  Preprocedure Note       Name:  Effie Mcallister   Age:  80 y.o.  :  1929                                          MRN:  [id number]         Date:  2022      Surgeon: Jamshid Quintanilla):  Carlyle Monterroso MD    Procedure: Procedure(s):  ROBOTIC RIGHT INGUINAL HERNIA REPAIR WITH MESH, POSSIBLE OPEN PROCEDURE    Medications prior to admission:   Prior to Admission medications    Medication Sig Start Date End Date Taking?  Authorizing Provider   midodrine (PROAMATINE) 5 MG tablet Take 1 tablet by mouth 3 times daily (with meals) Give at 8am, 12pm, 4pm, Do not give after 1800 or within 4 hrs of bedtime 20   Chely White MD   atenolol (TENORMIN) 25 MG tablet Take 1 tablet by mouth nightly  Patient taking differently: Take 25 mg by mouth daily  20   Stiven Dust, APRN - CNP   sennosides-docusate sodium (SENOKOT-S) 8.6-50 MG tablet Take 1 tablet by mouth 2 times daily 20   Chely White MD   clopidogrel (PLAVIX) 75 MG tablet Take 75 mg by mouth daily    Historical Provider, MD   simvastatin (ZOCOR) 20 MG tablet Take 20 mg by mouth nightly    Historical Provider, MD       Current medications:    Current Facility-Administered Medications   Medication Dose Route Frequency Provider Last Rate Last Admin    CEFAZOLIN 2000 MG D5W 100 ML IVPB IVPB                Allergies:  No Known Allergies    Problem List:    Patient Active Problem List   Diagnosis Code    HTN (hypertension), benign I10    Hyperlipidemia E78.5    Syncope and collapse R55    Closed right hip fracture ([facility] [address] S72.001A    JENISE (acute kidney injury) ([facility] [address] N17.9    Closed right hip fracture, initial encounter ([facility] [address] S72.001A    Orthostatic hypotension I95.1    Coronary artery disease involving native coronary artery of native heart without angina pectoris I25.10    Non-recurrent unilateral inguinal hernia without obstruction or gangrene K40.90       Past Medical History:        Diagnosis Date    Cancer [facility])     Bladder CA status post chemo/radiation 2018    Coronary artery disease     PCI 2007    CVA (cerebral vascular accident) ([facility] [address] 2002    Hyperlipidemia     Hypertension     Prostate cancer ([facility] [address]     Tx seeds       Past Surgical History:        Procedure Laterality Date    CYSTOSCOPY      few cysto    FEMUR FRACTURE SURGERY Right 11/30/2020    FEMUR INTRAMEDULLARY NAIL BRITTNEY INSERTION RIGHT performed by Bello Jeong DO at [facility] History:    Social History     Tobacco Use    Smoking status: Never Smoker    Smokeless tobacco: Never Used   Substance Use Topics    Alcohol use: Yes     Comment: occas beer                                Counseling given: Not Answered      Vital Signs (Current):   Vitals:    04/18/22 0947 04/20/22 1132   BP:  (!) 156/83   Pulse:  92   Resp:  16   Temp:  98.2 °F (36.8 °C)   TempSrc:  Oral   SpO2:  99%   Weight: 174 lb (78.9 kg)    Height: 5' 11\" (1.803 m)                                               BP Readings from Last 3 Encounters:   04/20/22 (!) 156/83   03/22/22 (!) 149/68   12/11/20 127/71       NPO Status: Time of last liquid consumption: 1800                        Time of last solid consumption: 1800                        Date of last liquid consumption: 04/19/22                        Date of last solid food consumption: 04/19/22    BMI:   Wt Readings from Last 3 Encounters:   04/18/22 174 lb (78.9 kg)   03/22/22 176 lb (79.8 kg)   01/07/21 175 lb (79.4 kg)     Body mass index is 24.27 kg/m².     CBC:   Lab Results   Component Value Date    WBC 9.2 12/11/2020    RBC 3.37 12/11/2020    HGB 11.0 12/11/2020    HCT 32.7 12/11/2020    MCV 96.9 12/11/2020    RDW 13.2 12/11/2020     12/11/2020       CMP:   Lab Results   Component Value Date     04/20/2022    K 4.0 04/20/2022     04/20/2022    CO2 24 04/20/2022    BUN 29 04/20/2022    CREATININE 1.5 04/20/2022    GFRAA 53 04/20/2022    AGRATIO 1.5 11/29/2020    LABGLOM 44 04/20/2022    GLUCOSE 109 04/20/2022    PROT 6.8 11/29/2020    CALCIUM 9.1 04/20/2022    BILITOT 0.6 11/29/2020    ALKPHOS 96 11/29/2020    AST 23 11/29/2020    ALT 13 11/29/2020       POC Tests: No results for input(s): POCGLU, POCNA, POCK, POCCL, POCBUN, POCHEMO, POCHCT in the last 72 hours. Coags:   Lab Results   Component Value Date    PROTIME 13.3 11/29/2020    INR 1.14 11/29/2020       HCG (If Applicable): No results found for: PREGTESTUR, PREGSERUM, HCG, HCGQUANT     ABGs: No results found for: PHART, PO2ART, GMK3WUY, PSO3ZHP, BEART, S4YBEOTB     Type & Screen (If Applicable):  No results found for: LABABO, LABRH    Drug/Infectious Status (If Applicable):  No results found for: HIV, HEPCAB    COVID-19 Screening (If Applicable):   Lab Results   Component Value Date    COVID19 Not Detected 12/10/2020    COVID19 Not Detected 12/02/2020           Anesthesia Evaluation  Patient summary reviewed no history of anesthetic complications:   Airway: Mallampati: II  TM distance: >3 FB   Neck ROM: full  Mouth opening: > = 3 FB Dental: normal exam         Pulmonary:Negative Pulmonary ROS and normal exam  breath sounds clear to auscultation                             Cardiovascular:Negative CV ROS  Exercise tolerance: good (>4 METS),   (+) hypertension:, CAD: obstructive, CABG/stent: no interval change,         Rhythm: regular  Rate: normal                    Neuro/Psych:   Negative Neuro/Psych ROS  (+) CVA:,             GI/Hepatic/Renal: Neg GI/Hepatic/Renal ROS            Endo/Other: Negative Endo/Other ROS                    Abdominal:             Vascular: negative vascular ROS. Other Findings:          Pre-Operative Diagnosis: Non-recurrent unilateral inguinal hernia without obstruction or gangrene [K40.90]    80 y.o.   BMI:  Body mass index is 24.27 kg/m².      Vitals:    04/18/22 0947 04/20/22 1132   BP:  (!) 156/83   Pulse:  92   Resp:  16   Temp:  98.2 °F (36.8 °C)   TempSrc: Oral   SpO2:  99%   Weight: 174 lb (78.9 kg)    Height: 5' 11\" (1.803 m)        No Known Allergies    Social History     Tobacco Use    Smoking status: Never Smoker    Smokeless tobacco: Never Used   Substance Use Topics    Alcohol use: Yes     Comment: occas beer       LABS:    CBC  Lab Results   Component Value Date/Time    WBC 9.2 12/11/2020 08:24 AM    HGB 11.0 (L) 12/11/2020 08:24 AM    HCT 32.7 (L) 12/11/2020 08:24 AM     12/11/2020 08:24 AM     RENAL  Lab Results   Component Value Date/Time     04/20/2022 11:40 AM    K 4.0 04/20/2022 11:40 AM     04/20/2022 11:40 AM    CO2 24 04/20/2022 11:40 AM    BUN 29 (H) 04/20/2022 11:40 AM    CREATININE 1.5 (H) 04/20/2022 11:40 AM    GLUCOSE 109 (H) 04/20/2022 11:40 AM     COAGS  Lab Results   Component Value Date/Time    PROTIME 13.3 (H) 11/29/2020 04:41 PM    INR 1.14 11/29/2020 04:41 PM          Anesthesia Plan      general     ASA 3     (I discussed with the patient the risks and benefits of PIV, anesthesia, IV Narcotics, PACU. All questions were answered the patient agrees with the plan and wishes to proceed)  Induction: intravenous.                           Jesus Melton MD   4/20/2022

## 2022-04-20 NOTE — PROGRESS NOTES
D: Patient here from [address] [place] [address] via stretcher, taken to bay 3 in PACU, all current drips, treatments, skin issues, and plan of care were reviewed by both RN's, patients care transferred with patient in stable condition.

## 2022-04-20 NOTE — ANESTHESIA POSTPROCEDURE EVALUATION
Department of Anesthesiology  Postprocedure Note    Patient: Lucio Medina  MRN: [id number]  YOB: 1929  Date of evaluation: 4/20/2022  Time:  5:15 PM     Procedure Summary     Date: 04/20/22 Room / Location: [address] [place] [address]    Anesthesia Start: 4827 Anesthesia Stop: 6526    Procedure: ROBOTIC RIGHT INGUINAL HERNIA REPAIR WITH MESH (N/A Abdomen) Diagnosis:       Non-recurrent unilateral inguinal hernia without obstruction or gangrene      (Non-recurrent unilateral inguinal hernia without obstruction or gangrene [K40.90])    Surgeons: Glenetta Cockayne, MD Responsible Provider: Arnulfo Desouza MD    Anesthesia Type: general ASA Status: 3          Anesthesia Type: general    Sterling Phase I: Sterling Score: 10    Sterling Phase II: Sterling Score: 10    Last vitals: Reviewed and per EMR flowsheets.        Anesthesia Post Evaluation    Comments: Postoperative Anesthesia Note    Name:    Lucio Medina  MRN:      [id number]    Patient Vitals in the past 12 hrs:  04/20/22 1655, BP:(!) 177/63, Pulse:67, Resp:13, SpO2:97 %  04/20/22 1650, BP:(!) 167/62, Pulse:67, Resp:12, SpO2:97 %  04/20/22 1645, BP:(!) 168/60, Pulse:66, Resp:13, SpO2:97 %  04/20/22 1640, Pulse:71, Resp:13, SpO2:96 %  04/20/22 1635, BP:(!) 181/66, Pulse:69, Resp:14, SpO2:97 %  04/20/22 1630, BP:(!) 170/65, Pulse:69, Resp:15, SpO2:97 %  04/20/22 1625, BP:(!) 180/74, Pulse:71, Resp:15, SpO2:98 %  04/20/22 1620, BP:(!) 182/85, Pulse:73, Resp:(!) 3, SpO2:99 %  04/20/22 1615, BP:(!) 200/81, Pulse:74, Resp:15, SpO2:98 %  04/20/22 1610, BP:(!) 165/67, Pulse:79, Resp:10, SpO2:98 %  04/20/22 1608, BP:(!) 196/83, Temp:96.9 °F (36.1 °C), Temp src:Temporal, Pulse:81, Resp:9, SpO2:99 %  04/20/22 1605, Pulse:84, Resp:(!) 0  04/20/22 1600, BP:(!) 188/83, Pulse:87, Resp:(!) 35  04/20/22 1132, BP:(!) 156/83, Temp:98.2 °F (36.8 °C), Temp src:Oral, Pulse:92, Resp:16, SpO2:99 %     LABS:    CBC  Lab Results       Component                Value Date/Time                  WBC                      9.2                 12/11/2020 08:24 AM        HGB                      11.0 (L)            12/11/2020 08:24 AM        HCT                      32.7 (L)            12/11/2020 08:24 AM        PLT                      426                 12/11/2020 08:24 AM   RENAL  Lab Results       Component                Value               Date/Time                  NA                       137                 04/20/2022 11:40 AM        K                        4.0                 04/20/2022 11:40 AM        CL                       101                 04/20/2022 11:40 AM        CO2                      24                  04/20/2022 11:40 AM        BUN                      29 (H)              04/20/2022 11:40 AM        CREATININE               1.5 (H)             04/20/2022 11:40 AM        GLUCOSE                  109 (H)             04/20/2022 11:40 AM   COAGS  Lab Results       Component                Value               Date/Time                  PROTIME                  13.3 (H)            11/29/2020 04:41 PM        INR                      1.14                11/29/2020 04:41 PM     Intake & Output:  @45ILIK@    Nausea & Vomiting:  No    Level of Consciousness:  Awake    Pain Assessment:  Adequate analgesia    Anesthesia Complications:  No apparent anesthetic complications    SUMMARY      Vital signs stable  OK to discharge from Stage I post anesthesia care.   Care transferred from Anesthesiology department on discharge from perioperative area

## 2022-04-20 NOTE — PROGRESS NOTES
A: Discharge instructions given to patients son who acknowledged understanding, patient was dressed with assistance, discharged via wheelchair to private car.

## 2022-04-20 NOTE — H&P
I have reviewed the history and physical and examined the patient. I find no relevant changes. I have reviewed with the patient and/or family members, during the preoperative office visit the risks, benefits, and alternatives to the procedure.     Fidelia Bal MD

## 2022-04-20 NOTE — ANESTHESIA PRE PROCEDURE
Department of Anesthesiology  Preprocedure Note       Name:  Cori Hughes   Age:  80 y.o.  :  1929                                          MRN:  [id number]         Date:  2022      Surgeon: Jesus Baum):  Ronaldo Rodriguez MD    Procedure: Procedure(s):  ROBOTIC RIGHT INGUINAL HERNIA REPAIR WITH MESH, POSSIBLE OPEN PROCEDURE    Medications prior to admission:   Prior to Admission medications    Medication Sig Start Date End Date Taking?  Authorizing Provider   midodrine (PROAMATINE) 5 MG tablet Take 1 tablet by mouth 3 times daily (with meals) Give at 8am, 12pm, 4pm, Do not give after 1800 or within 4 hrs of bedtime 20   Bill Shrestha MD   atenolol (TENORMIN) 25 MG tablet Take 1 tablet by mouth nightly  Patient taking differently: Take 25 mg by mouth daily  20   MICHAEL Lino - CNP   sennosides-docusate sodium (SENOKOT-S) 8.6-50 MG tablet Take 1 tablet by mouth 2 times daily 20   Bill Shrestha MD   clopidogrel (PLAVIX) 75 MG tablet Take 75 mg by mouth daily    Historical Provider, MD   simvastatin (ZOCOR) 20 MG tablet Take 20 mg by mouth nightly    Historical Provider, MD       Current medications:    Current Facility-Administered Medications   Medication Dose Route Frequency Provider Last Rate Last Admin    CEFAZOLIN 2000 MG D5W 100 ML IVPB IVPB                Allergies:  No Known Allergies    Problem List:    Patient Active Problem List   Diagnosis Code    HTN (hypertension), benign I10    Hyperlipidemia E78.5    Syncope and collapse R55    Closed right hip fracture ([facility] [address] S72.001A    JENISE (acute kidney injury) ([facility] [address] N17.9    Closed right hip fracture, initial encounter ([facility] [address] S72.001A    Orthostatic hypotension I95.1    Coronary artery disease involving native coronary artery of native heart without angina pectoris I25.10    Non-recurrent unilateral inguinal hernia without obstruction or gangrene K40.90       Past Medical History:        Diagnosis Date    Cancer [facility])     Bladder CA status post chemo/radiation 2018    Coronary artery disease     PCI 2007    CVA (cerebral vascular accident) ([facility] [address] 2002    Hyperlipidemia     Hypertension     Prostate cancer ([facility] [address]     Tx seeds       Past Surgical History:        Procedure Laterality Date    CYSTOSCOPY      few cysto    FEMUR FRACTURE SURGERY Right 11/30/2020    FEMUR INTRAMEDULLARY NAIL BRITTNEY INSERTION RIGHT performed by Didi Sanchez DO at [facility] History:    Social History     Tobacco Use    Smoking status: Never Smoker    Smokeless tobacco: Never Used   Substance Use Topics    Alcohol use: Yes     Comment: occas beer                                Counseling given: Not Answered      Vital Signs (Current):   Vitals:    04/18/22 0947 04/20/22 1132   BP:  (!) 156/83   Pulse:  92   Resp:  16   Temp:  98.2 °F (36.8 °C)   TempSrc:  Oral   SpO2:  99%   Weight: 174 lb (78.9 kg)    Height: 5' 11\" (1.803 m)                                               BP Readings from Last 3 Encounters:   04/20/22 (!) 156/83   03/22/22 (!) 149/68   12/11/20 127/71       NPO Status: Time of last liquid consumption: 1800                        Time of last solid consumption: 1800                        Date of last liquid consumption: 04/19/22                        Date of last solid food consumption: 04/19/22    BMI:   Wt Readings from Last 3 Encounters:   04/18/22 174 lb (78.9 kg)   03/22/22 176 lb (79.8 kg)   01/07/21 175 lb (79.4 kg)     Body mass index is 24.27 kg/m².     CBC:   Lab Results   Component Value Date    WBC 9.2 12/11/2020    RBC 3.37 12/11/2020    HGB 11.0 12/11/2020    HCT 32.7 12/11/2020    MCV 96.9 12/11/2020    RDW 13.2 12/11/2020     12/11/2020       CMP:   Lab Results   Component Value Date     04/20/2022    K 4.0 04/20/2022     04/20/2022    CO2 24 04/20/2022    BUN 29 04/20/2022    CREATININE 1.5 04/20/2022    GFRAA 53 04/20/2022    AGRATIO 1.5 11/29/2020    LABGLOM 44 04/20/2022    GLUCOSE 109 04/20/2022    PROT 6.8 11/29/2020    CALCIUM 9.1 04/20/2022    BILITOT 0.6 11/29/2020    ALKPHOS 96 11/29/2020    AST 23 11/29/2020    ALT 13 11/29/2020       POC Tests: No results for input(s): POCGLU, POCNA, POCK, POCCL, POCBUN, POCHEMO, POCHCT in the last 72 hours. Coags:   Lab Results   Component Value Date    PROTIME 13.3 11/29/2020    INR 1.14 11/29/2020       HCG (If Applicable): No results found for: PREGTESTUR, PREGSERUM, HCG, HCGQUANT     ABGs: No results found for: PHART, PO2ART, CQV4DDU, SYF2ANZ, BEART, D6MIIFXW     Type & Screen (If Applicable):  No results found for: LABABO, LABRH    Drug/Infectious Status (If Applicable):  No results found for: HIV, HEPCAB    COVID-19 Screening (If Applicable):   Lab Results   Component Value Date    COVID19 Not Detected 12/10/2020    COVID19 Not Detected 12/02/2020           Anesthesia Evaluation  Patient summary reviewed no history of anesthetic complications:   Airway: Mallampati: II  TM distance: >3 FB   Neck ROM: full  Mouth opening: > = 3 FB Dental: normal exam   (+) partials      Pulmonary:Negative Pulmonary ROS and normal exam  breath sounds clear to auscultation                             Cardiovascular:Negative CV ROS  Exercise tolerance: good (>4 METS),   (+) hypertension:, CAD:, CABG/stent:,         Rhythm: regular  Rate: normal                    Neuro/Psych:   Negative Neuro/Psych ROS              GI/Hepatic/Renal: Neg GI/Hepatic/Renal ROS            Endo/Other: Negative Endo/Other ROS                    Abdominal:             Vascular: negative vascular ROS. Other Findings:          Pre-Operative Diagnosis: Non-recurrent unilateral inguinal hernia without obstruction or gangrene [K40.90]    80 y.o.   BMI:  Body mass index is 24.27 kg/m².      Vitals:    04/18/22 0947 04/20/22 1132   BP:  (!) 156/83   Pulse:  92   Resp:  16   Temp:  98.2 °F (36.8 °C)   TempSrc:  Oral   SpO2:  99%   Weight: 174 lb (78.9 kg)    Height: 5' 11\" (1.803 m)        No Known Allergies    Social History     Tobacco Use    Smoking status: Never Smoker    Smokeless tobacco: Never Used   Substance Use Topics    Alcohol use: Yes     Comment: occas beer       LABS:    CBC  Lab Results   Component Value Date/Time    WBC 9.2 12/11/2020 08:24 AM    HGB 11.0 (L) 12/11/2020 08:24 AM    HCT 32.7 (L) 12/11/2020 08:24 AM     12/11/2020 08:24 AM     RENAL  Lab Results   Component Value Date/Time     04/20/2022 11:40 AM    K 4.0 04/20/2022 11:40 AM     04/20/2022 11:40 AM    CO2 24 04/20/2022 11:40 AM    BUN 29 (H) 04/20/2022 11:40 AM    CREATININE 1.5 (H) 04/20/2022 11:40 AM    GLUCOSE 109 (H) 04/20/2022 11:40 AM     COAGS  Lab Results   Component Value Date/Time    PROTIME 13.3 (H) 11/29/2020 04:41 PM    INR 1.14 11/29/2020 04:41 PM          Anesthesia Plan      general     ASA 3     (I discussed with the patient the risks and benefits of PIV, anesthesia, IV Narcotics, PACU. All questions were answered the patient agrees with the plan and wishes to proceed)  Induction: intravenous.                           Jolynn Olvera MD   4/20/2022

## 2022-04-21 NOTE — OP NOTE
Operative Note      Patient: Kwasi Corbin  YOB: 1929  MRN: [id number]    Date of Procedure: 4/20/2022    Pre-Op Diagnosis: Non-recurrent unilateral inguinal hernia without obstruction or gangrene [K40.90]    Post-Op Diagnosis: Same       Procedure(s):  ROBOTIC RIGHT INGUINAL HERNIA REPAIR WITH MESH    Surgeon(s):  Rei Wood MD    Assistant:   Surgical Assistant: Sia Lux    Anesthesia: General    Estimated Blood Loss (mL): less than 50     Complications: None    Specimens:   * No specimens in log *    Implants:  Implant Name Type Inv. Item Serial No.  Lot No. LRB No. Used Action   MESH CLARITZA XL [id number] INGUINAL POLYPR REP CRV [address] [facility] SEAL - [id number]  [facility] Eras 47 [id number] Right 1 Implanted         Drains: * No LDAs found *    Findings: large indirect right inguinal hernia, reduced and repaired w/ XL 3D Max mesh                 No left inguinal hernia seen    Detailed Description of Procedure:           PROCEDURE: The patient was placed on the table and placed under general anesthesia. Abdomen was prepped and draped in a sterile fashion. An 8-mm trocar incision was made in the lateral left abdomen, and the trocar passed through the abdominal wall under direct vision. The camera was introduced into the abdomen and a 2d 8-mm trocar was placed under direct vision in the right lateral abdomen. A 78LO supraumbilical incision was made and the 12mm trocar placed. No injury was noted from the placement of the ports. The robot was then docked. Both right and left inguinal areas were visualized and it appeared that there was a defect only on the right. The peritoneum above the inguinal region was incised horizontally and opened to midline. The peritoneal flap was dissected down across the inguinal region until the pubic ramus and tubercle were exposed.   I dissected down below the ramus into the space of Retzius, and also dissected across midline to make adequate room for eventual mesh placement. Lateral dissection was then done, curving around the iliopubic tract down to the psoas muscle below. Lastly, the indirect hernia sac at the inguinal canal was addressed. The hernia sac was carefully dissected away from the underlying cord structures, and then the sac was dissected out of the inguinal canal.  The sac was dissected well down below to the psoas muscle. Ultimately I felt had good dissection and exposure of the entire inguinal region. An extra large 3D Max mesh patch was then positioned into the preperitoneal space. 2-0 Vicryls were used to secure the mesh along the pubic ramus and also on the upper medial and lateral aspect of the mesh. The peritoneal flap was now closed with a running 3-0 Stratafix. Prior to completing the flap closure, the bedside assistant passed an Angiocatheter through the lower abdominal wall into the preperitoneal space. When the flap was closed this allowed the gas from the preperitoneal space to be evacuated. We observed the flap coming up nicely against the mesh. Mesh appeared to me in good position, laying nice and flat against the inguinal wall. There was good hemostasis at the end of the procedure. All the ports were then removed under direct vision. The umbilical 40XR fascial defect was closed with a figure-of-eight 0 Vicryl suture. The port sites were closed with 4-0 Monocryl stiches. Dermabond was placed over all the incisions. The patient tolerated the procedure well and went to the recovery room in stable condition.       Electronically signed by Cherrie Galeano MD on 4/20/2022 at 8:55 PM              Electronically signed by Cherrie Galeano MD on 4/20/2022 at 8:53 PM

## 2022-05-06 ENCOUNTER — OFFICE VISIT (OUTPATIENT)
Dept: SURGERY | Age: 87
End: 2022-05-06

## 2022-05-06 VITALS
SYSTOLIC BLOOD PRESSURE: 167 MMHG | HEART RATE: 123 BPM | HEIGHT: 71 IN | BODY MASS INDEX: 23.85 KG/M2 | WEIGHT: 170.4 LBS | DIASTOLIC BLOOD PRESSURE: 68 MMHG

## 2022-05-06 DIAGNOSIS — Z09 POSTOP CHECK: Primary | ICD-10-CM

## 2022-05-06 PROCEDURE — 99024 POSTOP FOLLOW-UP VISIT: CPT | Performed by: SURGERY

## 2022-05-06 PROCEDURE — G8420 CALC BMI NORM PARAMETERS: HCPCS | Performed by: SURGERY

## 2022-05-06 PROCEDURE — G8427 DOCREV CUR MEDS BY ELIG CLIN: HCPCS | Performed by: SURGERY

## 2022-05-06 PROCEDURE — 1123F ACP DISCUSS/DSCN MKR DOCD: CPT | Performed by: SURGERY

## 2022-05-06 PROCEDURE — 4040F PNEUMOC VAC/ADMIN/RCVD: CPT | Performed by: SURGERY

## 2022-05-06 PROCEDURE — 1036F TOBACCO NON-USER: CPT | Performed by: SURGERY

## 2022-05-06 NOTE — PROGRESS NOTES
[facility]    Surgery Progress Note           PATIENT NAME: Onel Corbin     TODAY'S DATE: 5/6/2022    SUBJECTIVE:    Pt  Is doing well after surgery. He denies any issues with fever, pain, or N/V/D. OBJECTIVE:   VITALS:  There were no vitals taken for this visit. CONSTITUTIONAL:  awake and alert  ABDOMEN:   normal bowel sounds, soft, non-distended, non-tender   INCISION: clean, dry, healed  Radiology Review:  N/A    ASSESSMENT AND PLAN:   Diagnosis Orders   1. Postop check       80 y.o. male status post robotic right inguinal hernia repair with mesh 4/20/22    -Patient is doing well with no complications after surgery   -Discussed resuming normal activities as tolerated   -Advised to call the office if any questions or concerns arise     Allen Munguia MD, PGY-1    Surgery Staff    I have examined this patient, and read and agree with the note by Allen Munguia MD from today; more than half of the total time was spent by me on the encounter.      Belkis Reyna MD

## 2022-09-02 NOTE — PATIENT INSTRUCTIONS
[address] [address]  Phone: [phone]  Fax: [phone] will be scheduled for surgery with Dr. Cuate Crenshaw. · The office will call you with the date and time that surgery is scheduled. · Please take note of these instructions for surgery:  · You should have nothing by mouth after midnight the night before your surgery - this includes no food or water. · Your surgery will be cancelled if you have taken anything by mouth after midnight, NO exceptions. · You will need to have a history and physical prior to your surgery. This will need to be completed up to 30 days before your surgery. This H/P can be completed by your family doctor or the hospital.   · IF you take coumadin (warfarin), please stop taking this medication 5 days prior to your surgery. · IF you take plavix, please stop taking this 7 days prior to your surgery. · Please contact our office if you have a pacemaker or defibrillator. · IF you are allergic to latex, please tell our office prior to your surgery. This is important in know before scheduling your surgery. · IF you are having an out patient surgery, you will need someone available to drive you home after your surgery, and to also stay with you for the rest of the day. · IF you are having a surgery requiring an inpatient stay in the hospital, you will need someone to drive you home upon discharge from the hospital.  · Please contact Dr. Netty Nageotte assistant Addison Marshall if you have any questions or concerns. · Please call the office with any changes in your symptoms or further questions/concerns.  [phone] Addended by: MARGARET LI on: 9/2/2022 11:48 AM     Modules accepted: Orders

## 2024-02-25 ENCOUNTER — APPOINTMENT (OUTPATIENT)
Dept: CT IMAGING | Age: 89
DRG: 884 | End: 2024-02-25
Payer: MEDICARE

## 2024-02-25 ENCOUNTER — HOSPITAL ENCOUNTER (INPATIENT)
Age: 89
LOS: 9 days | DRG: 884 | End: 2024-03-05
Attending: STUDENT IN AN ORGANIZED HEALTH CARE EDUCATION/TRAINING PROGRAM | Admitting: INTERNAL MEDICINE
Payer: MEDICARE

## 2024-02-25 ENCOUNTER — APPOINTMENT (OUTPATIENT)
Dept: GENERAL RADIOLOGY | Age: 89
DRG: 884 | End: 2024-02-25
Payer: MEDICARE

## 2024-02-25 DIAGNOSIS — R29.6 MULTIPLE FALLS: ICD-10-CM

## 2024-02-25 DIAGNOSIS — R79.89 ELEVATED BRAIN NATRIURETIC PEPTIDE (BNP) LEVEL: ICD-10-CM

## 2024-02-25 DIAGNOSIS — R40.20 LOSS OF CONSCIOUSNESS (HCC): ICD-10-CM

## 2024-02-25 DIAGNOSIS — R55 SYNCOPE AND COLLAPSE: Primary | ICD-10-CM

## 2024-02-25 DIAGNOSIS — J90 BILATERAL PLEURAL EFFUSION: ICD-10-CM

## 2024-02-25 DIAGNOSIS — R79.89 ELEVATED TROPONIN: ICD-10-CM

## 2024-02-25 DIAGNOSIS — M54.50 ACUTE MIDLINE LOW BACK PAIN WITHOUT SCIATICA: ICD-10-CM

## 2024-02-25 LAB
ALBUMIN SERPL-MCNC: 3.3 G/DL (ref 3.4–5)
ALBUMIN/GLOB SERPL: 1.4 {RATIO} (ref 1.1–2.2)
ALP SERPL-CCNC: 91 U/L (ref 40–129)
ALT SERPL-CCNC: 16 U/L (ref 10–40)
ANION GAP SERPL CALCULATED.3IONS-SCNC: 8 MMOL/L (ref 3–16)
AST SERPL-CCNC: 24 U/L (ref 15–37)
BASOPHILS # BLD: 0.1 K/UL (ref 0–0.2)
BASOPHILS NFR BLD: 0.5 %
BILIRUB SERPL-MCNC: 1 MG/DL (ref 0–1)
BILIRUB UR QL STRIP.AUTO: NEGATIVE
BUN SERPL-MCNC: 27 MG/DL (ref 7–20)
CALCIUM SERPL-MCNC: 7.9 MG/DL (ref 8.3–10.6)
CHLORIDE SERPL-SCNC: 100 MMOL/L (ref 99–110)
CLARITY UR: CLEAR
CO2 SERPL-SCNC: 26 MMOL/L (ref 21–32)
COLOR UR: YELLOW
CREAT SERPL-MCNC: 1.1 MG/DL (ref 0.8–1.3)
DEPRECATED RDW RBC AUTO: 13.1 % (ref 12.4–15.4)
EKG ATRIAL RATE: 80 BPM
EKG DIAGNOSIS: NORMAL
EKG Q-T INTERVAL: 428 MS
EKG QRS DURATION: 84 MS
EKG QTC CALCULATION (BAZETT): 490 MS
EKG R AXIS: 17 DEGREES
EKG T AXIS: 104 DEGREES
EKG VENTRICULAR RATE: 79 BPM
EOSINOPHIL # BLD: 0.1 K/UL (ref 0–0.6)
EOSINOPHIL NFR BLD: 0.7 %
FLUAV RNA RESP QL NAA+PROBE: NOT DETECTED
FLUBV RNA RESP QL NAA+PROBE: NOT DETECTED
GFR SERPLBLD CREATININE-BSD FMLA CKD-EPI: >60 ML/MIN/{1.73_M2}
GLUCOSE SERPL-MCNC: 115 MG/DL (ref 70–99)
GLUCOSE UR STRIP.AUTO-MCNC: NEGATIVE MG/DL
HCT VFR BLD AUTO: 32 % (ref 40.5–52.5)
HGB BLD-MCNC: 10.5 G/DL (ref 13.5–17.5)
HGB UR QL STRIP.AUTO: NEGATIVE
KETONES UR STRIP.AUTO-MCNC: NEGATIVE MG/DL
LEUKOCYTE ESTERASE UR QL STRIP.AUTO: NEGATIVE
LYMPHOCYTES # BLD: 1 K/UL (ref 1–5.1)
LYMPHOCYTES NFR BLD: 7.3 %
MCH RBC QN AUTO: 31.7 PG (ref 26–34)
MCHC RBC AUTO-ENTMCNC: 32.9 G/DL (ref 31–36)
MCV RBC AUTO: 96.5 FL (ref 80–100)
MONOCYTES # BLD: 1 K/UL (ref 0–1.3)
MONOCYTES NFR BLD: 7.4 %
NEUTROPHILS # BLD: 11.3 K/UL (ref 1.7–7.7)
NEUTROPHILS NFR BLD: 84.1 %
NITRITE UR QL STRIP.AUTO: NEGATIVE
NT-PROBNP SERPL-MCNC: 3387 PG/ML (ref 0–449)
PH UR STRIP.AUTO: 6.5 [PH] (ref 5–8)
PLATELET # BLD AUTO: 260 K/UL (ref 135–450)
PMV BLD AUTO: 8.1 FL (ref 5–10.5)
POTASSIUM SERPL-SCNC: 4.2 MMOL/L (ref 3.5–5.1)
PROT SERPL-MCNC: 5.7 G/DL (ref 6.4–8.2)
PROT UR STRIP.AUTO-MCNC: NEGATIVE MG/DL
RBC # BLD AUTO: 3.32 M/UL (ref 4.2–5.9)
SARS-COV-2 RNA RESP QL NAA+PROBE: NOT DETECTED
SODIUM SERPL-SCNC: 134 MMOL/L (ref 136–145)
SP GR UR STRIP.AUTO: 1.01 (ref 1–1.03)
TROPONIN, HIGH SENSITIVITY: 59 NG/L (ref 0–22)
TROPONIN, HIGH SENSITIVITY: 61 NG/L (ref 0–22)
TROPONIN, HIGH SENSITIVITY: 68 NG/L (ref 0–22)
UA COMPLETE W REFLEX CULTURE PNL UR: NORMAL
UA DIPSTICK W REFLEX MICRO PNL UR: NORMAL
URN SPEC COLLECT METH UR: NORMAL
UROBILINOGEN UR STRIP-ACNC: 1 E.U./DL
WBC # BLD AUTO: 13.5 K/UL (ref 4–11)

## 2024-02-25 PROCEDURE — 1200000000 HC SEMI PRIVATE

## 2024-02-25 PROCEDURE — 80053 COMPREHEN METABOLIC PANEL: CPT

## 2024-02-25 PROCEDURE — 71045 X-RAY EXAM CHEST 1 VIEW: CPT

## 2024-02-25 PROCEDURE — 72131 CT LUMBAR SPINE W/O DYE: CPT

## 2024-02-25 PROCEDURE — 6370000000 HC RX 637 (ALT 250 FOR IP): Performed by: INTERNAL MEDICINE

## 2024-02-25 PROCEDURE — 36415 COLL VENOUS BLD VENIPUNCTURE: CPT

## 2024-02-25 PROCEDURE — 85025 COMPLETE CBC W/AUTO DIFF WBC: CPT

## 2024-02-25 PROCEDURE — 84484 ASSAY OF TROPONIN QUANT: CPT

## 2024-02-25 PROCEDURE — 83880 ASSAY OF NATRIURETIC PEPTIDE: CPT

## 2024-02-25 PROCEDURE — 6360000002 HC RX W HCPCS: Performed by: INTERNAL MEDICINE

## 2024-02-25 PROCEDURE — 6360000002 HC RX W HCPCS: Performed by: NURSE PRACTITIONER

## 2024-02-25 PROCEDURE — 6360000002 HC RX W HCPCS: Performed by: STUDENT IN AN ORGANIZED HEALTH CARE EDUCATION/TRAINING PROGRAM

## 2024-02-25 PROCEDURE — 99285 EMERGENCY DEPT VISIT HI MDM: CPT

## 2024-02-25 PROCEDURE — 93005 ELECTROCARDIOGRAM TRACING: CPT | Performed by: STUDENT IN AN ORGANIZED HEALTH CARE EDUCATION/TRAINING PROGRAM

## 2024-02-25 PROCEDURE — 6370000000 HC RX 637 (ALT 250 FOR IP): Performed by: STUDENT IN AN ORGANIZED HEALTH CARE EDUCATION/TRAINING PROGRAM

## 2024-02-25 PROCEDURE — 96374 THER/PROPH/DIAG INJ IV PUSH: CPT

## 2024-02-25 PROCEDURE — 2580000003 HC RX 258

## 2024-02-25 PROCEDURE — 93010 ELECTROCARDIOGRAM REPORT: CPT | Performed by: INTERNAL MEDICINE

## 2024-02-25 PROCEDURE — 2580000003 HC RX 258: Performed by: INTERNAL MEDICINE

## 2024-02-25 PROCEDURE — 70450 CT HEAD/BRAIN W/O DYE: CPT

## 2024-02-25 PROCEDURE — 81003 URINALYSIS AUTO W/O SCOPE: CPT

## 2024-02-25 PROCEDURE — 87636 SARSCOV2 & INF A&B AMP PRB: CPT

## 2024-02-25 RX ORDER — OLANZAPINE 10 MG/2ML
5 INJECTION, POWDER, FOR SOLUTION INTRAMUSCULAR ONCE
Status: COMPLETED | OUTPATIENT
Start: 2024-02-25 | End: 2024-02-25

## 2024-02-25 RX ORDER — ACETAMINOPHEN 325 MG/1
650 TABLET ORAL EVERY 6 HOURS PRN
Status: DISCONTINUED | OUTPATIENT
Start: 2024-02-25 | End: 2024-03-06 | Stop reason: HOSPADM

## 2024-02-25 RX ORDER — SODIUM CHLORIDE 0.9 % (FLUSH) 0.9 %
5-40 SYRINGE (ML) INJECTION PRN
Status: DISCONTINUED | OUTPATIENT
Start: 2024-02-25 | End: 2024-03-06 | Stop reason: HOSPADM

## 2024-02-25 RX ORDER — ATORVASTATIN CALCIUM 10 MG/1
20 TABLET, FILM COATED ORAL DAILY
Status: DISCONTINUED | OUTPATIENT
Start: 2024-02-25 | End: 2024-03-06 | Stop reason: HOSPADM

## 2024-02-25 RX ORDER — SODIUM CHLORIDE 9 MG/ML
INJECTION, SOLUTION INTRAVENOUS CONTINUOUS
Status: ACTIVE | OUTPATIENT
Start: 2024-02-25 | End: 2024-02-27

## 2024-02-25 RX ORDER — QUETIAPINE FUMARATE 25 MG/1
25 TABLET, FILM COATED ORAL 2 TIMES DAILY
Status: DISCONTINUED | OUTPATIENT
Start: 2024-02-25 | End: 2024-03-06 | Stop reason: HOSPADM

## 2024-02-25 RX ORDER — ENOXAPARIN SODIUM 100 MG/ML
40 INJECTION SUBCUTANEOUS DAILY
Status: DISCONTINUED | OUTPATIENT
Start: 2024-02-25 | End: 2024-03-06 | Stop reason: HOSPADM

## 2024-02-25 RX ORDER — ONDANSETRON 4 MG/1
4 TABLET, ORALLY DISINTEGRATING ORAL EVERY 8 HOURS PRN
Status: DISCONTINUED | OUTPATIENT
Start: 2024-02-25 | End: 2024-03-06 | Stop reason: HOSPADM

## 2024-02-25 RX ORDER — ACETAMINOPHEN 650 MG/1
650 SUPPOSITORY RECTAL EVERY 6 HOURS PRN
Status: DISCONTINUED | OUTPATIENT
Start: 2024-02-25 | End: 2024-03-06 | Stop reason: HOSPADM

## 2024-02-25 RX ORDER — ASPIRIN 81 MG/1
324 TABLET, CHEWABLE ORAL ONCE
Status: COMPLETED | OUTPATIENT
Start: 2024-02-25 | End: 2024-02-25

## 2024-02-25 RX ORDER — SODIUM CHLORIDE 0.9 % (FLUSH) 0.9 %
5-40 SYRINGE (ML) INJECTION EVERY 12 HOURS SCHEDULED
Status: DISCONTINUED | OUTPATIENT
Start: 2024-02-25 | End: 2024-03-06 | Stop reason: HOSPADM

## 2024-02-25 RX ORDER — WATER 10 ML/10ML
INJECTION INTRAMUSCULAR; INTRAVENOUS; SUBCUTANEOUS
Status: COMPLETED
Start: 2024-02-25 | End: 2024-02-25

## 2024-02-25 RX ORDER — CLOPIDOGREL BISULFATE 75 MG/1
75 TABLET ORAL DAILY
Status: DISCONTINUED | OUTPATIENT
Start: 2024-02-25 | End: 2024-03-06 | Stop reason: HOSPADM

## 2024-02-25 RX ORDER — MIDODRINE HYDROCHLORIDE 5 MG/1
5 TABLET ORAL
Status: DISCONTINUED | OUTPATIENT
Start: 2024-02-25 | End: 2024-03-06 | Stop reason: HOSPADM

## 2024-02-25 RX ORDER — ATENOLOL 25 MG/1
25 TABLET ORAL NIGHTLY
Status: DISCONTINUED | OUTPATIENT
Start: 2024-02-25 | End: 2024-03-06 | Stop reason: HOSPADM

## 2024-02-25 RX ORDER — POLYETHYLENE GLYCOL 3350 17 G/17G
17 POWDER, FOR SOLUTION ORAL DAILY PRN
Status: DISCONTINUED | OUTPATIENT
Start: 2024-02-25 | End: 2024-03-06 | Stop reason: HOSPADM

## 2024-02-25 RX ORDER — ASPIRIN 81 MG/1
81 TABLET, CHEWABLE ORAL DAILY
Status: DISCONTINUED | OUTPATIENT
Start: 2024-02-25 | End: 2024-03-06 | Stop reason: HOSPADM

## 2024-02-25 RX ORDER — HALOPERIDOL 5 MG/ML
1 INJECTION INTRAMUSCULAR ONCE AS NEEDED
Status: COMPLETED | OUTPATIENT
Start: 2024-02-25 | End: 2024-02-25

## 2024-02-25 RX ORDER — ONDANSETRON 2 MG/ML
4 INJECTION INTRAMUSCULAR; INTRAVENOUS EVERY 6 HOURS PRN
Status: DISCONTINUED | OUTPATIENT
Start: 2024-02-25 | End: 2024-03-06 | Stop reason: HOSPADM

## 2024-02-25 RX ORDER — SODIUM CHLORIDE 9 MG/ML
INJECTION, SOLUTION INTRAVENOUS PRN
Status: DISCONTINUED | OUTPATIENT
Start: 2024-02-25 | End: 2024-03-06 | Stop reason: HOSPADM

## 2024-02-25 RX ORDER — FUROSEMIDE 10 MG/ML
40 INJECTION INTRAMUSCULAR; INTRAVENOUS ONCE
Status: COMPLETED | OUTPATIENT
Start: 2024-02-25 | End: 2024-02-25

## 2024-02-25 RX ADMIN — FUROSEMIDE 40 MG: 10 INJECTION, SOLUTION INTRAMUSCULAR; INTRAVENOUS at 06:02

## 2024-02-25 RX ADMIN — ATORVASTATIN CALCIUM 20 MG: 10 TABLET, FILM COATED ORAL at 12:10

## 2024-02-25 RX ADMIN — ASPIRIN 81 MG 81 MG: 81 TABLET ORAL at 12:10

## 2024-02-25 RX ADMIN — HALOPERIDOL LACTATE 1 MG: 5 INJECTION, SOLUTION INTRAMUSCULAR at 16:21

## 2024-02-25 RX ADMIN — OLANZAPINE 5 MG: 10 INJECTION, POWDER, FOR SOLUTION INTRAMUSCULAR at 20:52

## 2024-02-25 RX ADMIN — SODIUM CHLORIDE: 9 INJECTION, SOLUTION INTRAVENOUS at 11:35

## 2024-02-25 RX ADMIN — ASPIRIN 81 MG 324 MG: 81 TABLET ORAL at 04:56

## 2024-02-25 RX ADMIN — WATER 10 ML: 1 INJECTION INTRAMUSCULAR; INTRAVENOUS; SUBCUTANEOUS at 20:52

## 2024-02-25 RX ADMIN — ATENOLOL 25 MG: 25 TABLET ORAL at 20:51

## 2024-02-25 RX ADMIN — SODIUM CHLORIDE, PRESERVATIVE FREE 10 ML: 5 INJECTION INTRAVENOUS at 20:41

## 2024-02-25 RX ADMIN — QUETIAPINE FUMARATE 25 MG: 25 TABLET ORAL at 17:08

## 2024-02-25 RX ADMIN — SODIUM CHLORIDE, PRESERVATIVE FREE 10 ML: 5 INJECTION INTRAVENOUS at 12:10

## 2024-02-25 RX ADMIN — ENOXAPARIN SODIUM 40 MG: 100 INJECTION SUBCUTANEOUS at 12:10

## 2024-02-25 RX ADMIN — CLOPIDOGREL BISULFATE 75 MG: 75 TABLET ORAL at 12:10

## 2024-02-25 ASSESSMENT — PAIN SCALES - GENERAL: PAINLEVEL_OUTOF10: 0

## 2024-02-25 NOTE — ED NOTES
Patient wife to nurses station requesting nurse look up a phone number for her neighbor.  Patient wife instructed that we do not have access to phone number log. Attempted to look up neighbor phone number on google without any success.  Patient states she can't get ahold of her son at this time; unsure how she is going to get home. Voicemail left for patient son, Hans.

## 2024-02-25 NOTE — ED PROVIDER NOTES
Take 75 mg by mouth daily    MIDODRINE (PROAMATINE) 5 MG TABLET    Take 1 tablet by mouth 3 times daily (with meals) Give at 8am, 12pm, 4pm, Do not give after 1800 or within 4 hrs of bedtime    SENNOSIDES-DOCUSATE SODIUM (SENOKOT-S) 8.6-50 MG TABLET    Take 1 tablet by mouth 2 times daily    SIMVASTATIN (ZOCOR) 20 MG TABLET    Take 20 mg by mouth nightly       ALLERGIES     Patient has no known allergies.    FAMILYHISTORY     History reviewed. No pertinent family history.     SOCIAL HISTORY       Social History     Tobacco Use    Smoking status: Never    Smokeless tobacco: Never   Vaping Use    Vaping Use: Never used   Substance Use Topics    Alcohol use: Yes     Comment: occas beer    Drug use: No       SCREENINGS        Yessenia Coma Scale  Eye Opening: Spontaneous  Best Verbal Response: Confused  Best Motor Response: Obeys commands  [place] Coma Scale Score: 14                CIWA Assessment  BP: (!) 158/80  Pulse: 72           PHYSICAL EXAM  1 or more Elements     ED Triage Vitals [02/25/24 0158]   BP Temp Temp Source Pulse Respirations SpO2 Height Weight   118/60 98.6 °F (37 °C) Oral 85 16 99 % -- --       Physical Exam    General: Alert, No acute distress.  Eye: Normal conjunctiva. Sclera anicteric.  PERRL.  EOMI.  HENT: Oral mucosa is moist.  Normocephalic, atraumatic.  CERVICAL SPINE: There is no cervical midline tenderness to palpation, step-offs, or acute deformities. No posterior midline pain on full ROM. The cervical spine has been cleared clinically.  Respiratory: Respirations even and non-labored.  Diminished breath sounds at bilateral bases.  Cardiovascular: Normal rate, Regular rhythm.  Intact peripheral pulses.  Gastrointestinal: Non-distended.  Soft, nontender to palpation.  Musculoskeletal: No deformities.  No step-offs.  Midline tenderness to palpation to the low lumbar spine.  Integumentary: Warm, Dry.  No open wounds.  Neurologic: Alert, oriented to self, situation but not to place or time.   Cranial nerves II through XII intact.  Strength 5 out of 5 throughout.  Sensation intact throughout.  No focal deficits.      DIAGNOSTIC RESULTS   LABS:    Labs Reviewed   CBC WITH AUTO DIFFERENTIAL - Abnormal; Notable for the following components:       Result Value    WBC 13.5 (*)     RBC 3.32 (*)     Hemoglobin 10.5 (*)     Hematocrit 32.0 (*)     Neutrophils Absolute 11.3 (*)     All other components within normal limits   COMPREHENSIVE METABOLIC PANEL W/ REFLEX TO MG FOR LOW K - Abnormal; Notable for the following components:    Sodium 134 (*)     Glucose 115 (*)     BUN 27 (*)     Calcium 7.9 (*)     Total Protein 5.7 (*)     Albumin 3.3 (*)     All other components within normal limits   BRAIN NATRIURETIC PEPTIDE - Abnormal; Notable for the following components:    Pro-BNP 3,387 (*)     All other components within normal limits   TROPONIN - Abnormal; Notable for the following components:    Troponin, High Sensitivity 61 (*)     All other components within normal limits   TROPONIN - Abnormal; Notable for the following components:    Troponin, High Sensitivity 59 (*)     All other components within normal limits   COVID-19 & INFLUENZA COMBO   URINALYSIS WITH REFLEX TO CULTURE       When ordered only abnormal lab results are displayed. All other labs were within normal range or not returned as of this dictation.    EKG:     The Ekg interpreted by me shows  Rhythm normal sinus rhythm with first-degree AV block  Rate of 79 bpm  Axis is normal  Intervals and durations prolonged QT, prolonged LA  ST Segments: Nonspecific ST abnormalities  Compared to prior EKG dated April 20, 2022, LA interval significantly more prolonged, there are nonspecific ST changes.  Prior inferolateral infarct is unchanged.      RADIOLOGY:   Non-plain film images such as CT, Ultrasound and MRI are read by the radiologist. Plain radiographic images are visualized and preliminarily interpreted by the ED Provider with the below  findings:        Interpretation per the Radiologist below, if available at the time of this note:    CT LUMBAR SPINE WO CONTRAST   Preliminary Result   1. Age-indeterminate wedge compression deformities of the superior endplates   of L4 and L5, most likely chronic.  However, consider a follow-up lumbar MRI   to further evaluate the acuity of these abnormalities.   2. Bilateral spondylolysis at L5 with associated grade II spondylolisthesis   at L5-S1.   3. Multilevel degenerative disc disease and facet arthropathy, most   pronounced at L5-S1.   4. Mild to moderate left hydronephrosis and left hydroureter, without   definite demonstrable cause.   5. Small bilateral pleural effusions.         CT HEAD WO CONTRAST   Preliminary Result   No acute intracranial abnormality.         XR CHEST PORTABLE   Final Result   Areas of calcified pleural plaque which are best appreciated along the   diaphragm but other are suspected superimposed over the right mid to lower   lung and left lower lung.      New or increased opacity along the left base could relate to atelectatic   changes or infection but contusion is not excluded in the trauma setting.           CT LUMBAR SPINE WO CONTRAST    Result Date: 2/25/2024  1. Age-indeterminate wedge compression deformities of the superior endplates of L4 and L5, most likely chronic.  However, consider a follow-up lumbar MRI to further evaluate the acuity of these abnormalities. 2. Bilateral spondylolysis at L5 with associated grade II spondylolisthesis at L5-S1. 3. Multilevel degenerative disc disease and facet arthropathy, most pronounced at L5-S1. 4. Mild to moderate left hydronephrosis and left hydroureter, without definite demonstrable cause. 5. Small bilateral pleural effusions.     CT HEAD WO CONTRAST    Result Date: 2/25/2024  No acute intracranial abnormality.     XR CHEST PORTABLE    Result Date: 2/25/2024  EXAMINATION: ONE XRAY VIEW OF THE CHEST 2/25/2024 2:25 am COMPARISON:

## 2024-02-25 NOTE — H&P
Hospital Medicine History & Physical      Date of Admission: 2/25/2024    Date of Service:  Pt seen/examined on  2/25/24    [x]Admitted to Inpatient with expected LOS greater than two midnights due to medical therapy.  []Placed in Observation status.    Chief Admission Complaint: Weakness, increased confusion, fall possible syncope    Presenting Admission History:      89 y.o. male  with PMH significant for HTN, Hyperlipidemia, CAD, dementia.  History of present illness is limited as patient is a very poor historian and his wife who is present is also a very poor historian    He was brought to the ED by EMS after his wife called them.    Per report he has been having increased weakness, increased confusion and multiple falls at home.  There was what was described a syncopal episode while he was in the toilet and trying to get up off the toilet.  He does have complaint of lower back pain.  Wife describes he is getting increased confused, increased weakness and having multiple falls at home.  She is having difficult time taking care of him and brought him to the ED for further evaluation.  Workup in the ED did include CT lumbar spine which did reveal compression fractures likely chronic.  CT scan of the head revealed no acute intracranial abnormality.  Chest x-ray also done.  There is no report of chest pain or sob.     He not safe to be discharged home.  Wife states she is not able to care for him and she has concern that  he would  fall and sustain injuries.  He is now admitted for further evaluation management      .      Assessment/Plan:      Current Principal Problem:  <principal problem not specified>    Syncope : Uncertain if there was true syncope or he is very weak and falling.  Per wife there is more falling and weakness.   He will be admitted we will follow him on telemetry to exclude any malignant arrhythmia.    Weakness and deconditioning: He does appear very weak and frail.  Per wife he is having  without any focal sensory/motor deficits. Cranial nerves: II-XII intact, grossly non-focal.  Psychiatric: He is awake, he is confused.  He is unable to tell me the name of this hospital, the month or the year.  Short-term recall is 0/3 objects at 1 minute,  poor insight  He is not able to tell me why he is in the hospital.  Peripheral Pulses:  +2 palpable, equal bilaterally     Diet: [x]Adult/General  []Cardiac  []Diabetic  []Low Fat  []NPO  []NPO after Midnight  []Other   DVT Prophylaxis: [x]PPx LMWH  []SQ Heparin  []IPC/SCDs  []Eliquis  []Xarelto  []Coumadin     Code status: [x]Full  []DNR/CCA  []Limited (DNR/CCA with Do Not Intubate)  []DNRCC  PT/OT Eval Status:   []NOT yet ordered  [x]Ordered and Pending   []Seen with Recommendations for:  []Home independently  []Home w/ assist  []HHC  []SNF  []Acute Rehab    Anticipated Discharge Day/Date: 2 to 3 days    Anticipated Discharge Location: []Home  []HHC  []SNF  []Acute Rehab  [x]ECF  []LTAC  []Hospice    Consults:      None      This patient has a high likelihood of being discharged tomorrow and is appropriate for A1 Discharge Unit in AM pending clinical course overnight: []Yes  [x]No    --------------------------------------------------------------------------------------------------------------------------------------------------------------------    Imaging:     CT LUMBAR SPINE WO CONTRAST    Result Date: 2/25/2024  1. Age-indeterminate wedge compression deformities of the superior endplates of L4 and L5, most likely chronic.  However, consider a follow-up lumbar MRI to further evaluate the acuity of these abnormalities. 2. Bilateral spondylolysis at L5 with associated grade II spondylolisthesis at L5-S1. 3. Multilevel degenerative disc disease and facet arthropathy, most pronounced at L5-S1. 4. Mild to moderate left hydronephrosis and left hydroureter, without definite demonstrable cause. 5. Small bilateral pleural effusions.     CT HEAD WO CONTRAST    Result        Medications Prior to Admission:   Prior to Admission medications    Medication Sig Start Date End Date Taking? Authorizing Provider   midodrine (PROAMATINE) 5 MG tablet Take 1 tablet by mouth 3 times daily (with meals) Give at 8am, 12pm, 4pm, Do not give after 1800 or within 4 hrs of bedtime 12/11/20   Andrea Kent MD   atenolol (TENORMIN) 25 MG tablet Take 1 tablet by mouth nightly  Patient taking differently: Take 25 mg by mouth daily  12/9/20   Zhanna Dickinson, APRN - CNP   sennosides-docusate sodium (SENOKOT-S) 8.6-50 MG tablet Take 1 tablet by mouth 2 times daily 12/9/20   Andrea Kent MD   clopidogrel (PLAVIX) 75 MG tablet Take 75 mg by mouth daily    ProviderEllie MD   simvastatin (ZOCOR) 20 MG tablet Take 20 mg by mouth nightly    ProviderEllie MD       Labs: Personally reviewed and interpreted for clinical significance.   Recent Labs     02/25/24  0231   WBC 13.5*   HGB 10.5*   HCT 32.0*        Recent Labs     02/25/24  0231   *   K 4.2      CO2 26   BUN 27*   CREATININE 1.1   CALCIUM 7.9*     Recent Labs     02/25/24  0231 02/25/24  0341   PROBNP 3,387*  --    TROPHS 61* 59*     No results for input(s): \"LABA1C\" in the last 72 hours.  Recent Labs     02/25/24 0231   AST 24   ALT 16   BILITOT 1.0   ALKPHOS 91     No results for input(s): \"INR\", \"LACTA\", \"TSH\" in the last 72 hours.     ANDREINA MARES MD

## 2024-02-25 NOTE — ED TRIAGE NOTES
Patient wife at bedside. Patient following commands. Wife is extremely hard of hearing. Both are poor historians. Per EMS  ambulated to the restroom and was hard to arouse. She called EMS. When EMS arrived patient was alert and following commands but lethargic. Patient following commands and alert upon arrival to ED. Wife reports patient takes 3 medications but doesn't know what they are. Wife reports she is the only care giver at home.

## 2024-02-25 NOTE — ED NOTES
Complete bed change performed.  Head to toe bed bath.New gown placed. Purewick placed on patient. Warm blankets provided.

## 2024-02-25 NOTE — PLAN OF CARE
Problem: Discharge Planning  Goal: Discharge to home or other facility with appropriate resources  Outcome: Progressing     Problem: Pain  Goal: Verbalizes/displays adequate comfort level or baseline comfort level  Outcome: Progressing     Problem: ABCDS Injury Assessment  Goal: Absence of physical injury  Outcome: Progressing     Problem: Skin/Tissue Integrity  Goal: Absence of new skin breakdown  Description: 1.  Monitor for areas of redness and/or skin breakdown  2.  Assess vascular access sites hourly  3.  Every 4-6 hours minimum:  Change oxygen saturation probe site  4.  Every 4-6 hours:  If on nasal continuous positive airway pressure, respiratory therapy assess nares and determine need for appliance change or resting period.  Outcome: Progressing     Problem: Safety - Adult  Goal: Free from fall injury  Outcome: Progressing      Assessment/Plan


Status:  stable, progressing


Assessment/Plan:


Assessment


- slowly rising LFT, ? etiology  -- today better


- imaging negative, ? infectious or inflammatory process


- respiratory infection





Recommendations


- check HAV Ig M  (Ig total positive)


- Check CMV  PCR


- check EBV IgM


- Check HSV IgM


- Check CPK - normal


- Check BONNIE


- avoid hepatotoxic meds





Subjective


Allergies:  


Coded Allergies:  


     No Known Allergies (Unverified , 6/30/19)


Subjective


Feels OK


no abd pain


eating OK





Objective





Last 24 Hour Vital Signs








  Date Time  Temp Pulse Resp B/P (MAP) Pulse Ox O2 Delivery O2 Flow Rate FiO2


 


7/4/19 09:19  59  138/73    


 


7/4/19 09:19    138/73    


 


7/4/19 09:00      Room Air  


 


7/4/19 08:00 97.5 59 20 138/73 (94) 95   


 


7/4/19 04:00 98.0 54 20 133/62 (85) 95   


 


7/4/19 04:00  53      


 


7/4/19 00:00  46      


 


7/4/19 00:00 97.9 50 20 120/70 (87) 97   


 


7/3/19 22:18  56  138/75    


 


7/3/19 22:18  56  138/75    


 


7/3/19 21:00      Room Air  


 


7/3/19 20:00 97.8 56 20 138/75 (96) 96   


 


7/3/19 20:00  54      


 


7/3/19 18:30    136/60    


 


7/3/19 16:00  45      


 


7/3/19 16:00 98.3 52 16 136/60 (85) 98   

















Intake and Output  


 


 7/3/19 7/4/19





 19:00 07:00


 


Intake Total 1200 ml 480 ml


 


Balance 1200 ml 480 ml


 


  


 


Intake Oral 1200 ml 480 ml


 


# Voids 5 3


 


# Bowel Movements 1 








Laboratory Tests


7/4/19 05:35: 


Sodium Level 145, Potassium Level 2.9L, Chloride Level 106, Carbon Dioxide 

Level 26, Anion Gap 13, Blood Urea Nitrogen 45H, Creatinine 2.4H, Estimat 

Glomerular Filtration Rate , Glucose Level 179H, Calcium Level 8.8, Total 

Bilirubin 0.8, Aspartate Amino Transf (AST/SGOT) 109H, Alanine Aminotransferase 

(ALT/SGPT) 283H, Alkaline Phosphatase 142H, Pro-B-Type Natriuretic Peptide 

37126R, Total Protein 6.3L, Albumin 3.3L, Globulin 3.0, Albumin/Globulin Ratio 

1.1


Height (Feet):  5


Height (Inches):  4.00


Weight (Pounds):  139


Objective


WDWN AA woman


NCAT


supple


CTA


RR


abd soft NT ND


no edema


non focal











Ricardo Taylor MD Jul 4, 2019 12:22

## 2024-02-25 NOTE — PROGRESS NOTES
Pt confused and agitated.  Tele sitter in place.  Bedside sitter in place.  Pt trying to get out of bed, pulling at IV lines and stating he is going to \"bite\" staff members.  Haldol administered at 1621. Seroquel administered at 1708.

## 2024-02-25 NOTE — PROGRESS NOTES
4 Eyes Skin Assessment     NAME:  Hans Corbin  YOB: 1929  MEDICAL RECORD NUMBER:  [id number]    The patient is being assessed for  Admission    I agree that at least one RN has performed a thorough Head to Toe Skin Assessment on the patient. ALL assessment sites listed below have been assessed.      Areas assessed by both nurses:    Head, Face, Ears, Shoulders, Back, Chest, Arms, Elbows, Hands, Sacrum. Buttock, Coccyx, Ischium, Legs. Feet and Heels, and Under Medical Devices         Does the Patient have a Wound? No noted wound(s)  Scattered bruising on lower back; Abrasion on L leg; Redness on BL heels        Marcus Prevention initiated by RN: Yes  Wound Care Orders initiated by RN: No    Pressure Injury (Stage 3,4, Unstageable, DTI, NWPT, and Complex wounds) if present, place Wound referral order by RN under : No    New Ostomies, if present place, Ostomy referral order under : No     Nurse 1 eSignature: Electronically signed by Tammy Lunsford RN on 2/25/24 at 12:02 PM EST    **SHARE this note so that the co-signing nurse can place an eSignature**    Nurse 2 eSignature: Electronically signed by Nadja Puckett RN on 2/25/24 at 3:20 PM EST

## 2024-02-25 NOTE — PROGRESS NOTES
Pt still agitated after medication administration.  Pt continues to threaten to bite the sitter. MD notified for possible restraint orders.

## 2024-02-26 LAB
ANION GAP SERPL CALCULATED.3IONS-SCNC: 16 MMOL/L (ref 3–16)
BUN SERPL-MCNC: 23 MG/DL (ref 7–20)
CALCIUM SERPL-MCNC: 8.5 MG/DL (ref 8.3–10.6)
CHLORIDE SERPL-SCNC: 99 MMOL/L (ref 99–110)
CHOLEST SERPL-MCNC: 134 MG/DL (ref 0–199)
CO2 SERPL-SCNC: 20 MMOL/L (ref 21–32)
CREAT SERPL-MCNC: 1.1 MG/DL (ref 0.8–1.3)
DEPRECATED RDW RBC AUTO: 13.2 % (ref 12.4–15.4)
EKG ATRIAL RATE: 90 BPM
EKG DIAGNOSIS: NORMAL
EKG P AXIS: 75 DEGREES
EKG P-R INTERVAL: 276 MS
EKG Q-T INTERVAL: 424 MS
EKG QRS DURATION: 82 MS
EKG QTC CALCULATION (BAZETT): 518 MS
EKG R AXIS: 22 DEGREES
EKG T AXIS: 94 DEGREES
EKG VENTRICULAR RATE: 90 BPM
GFR SERPLBLD CREATININE-BSD FMLA CKD-EPI: >60 ML/MIN/{1.73_M2}
GLUCOSE BLD-MCNC: 147 MG/DL (ref 70–99)
GLUCOSE SERPL-MCNC: 107 MG/DL (ref 70–99)
HCT VFR BLD AUTO: 36.6 % (ref 40.5–52.5)
HDLC SERPL-MCNC: 66 MG/DL (ref 40–60)
HGB BLD-MCNC: 12.4 G/DL (ref 13.5–17.5)
LDLC SERPL CALC-MCNC: 54 MG/DL
MCH RBC QN AUTO: 32.6 PG (ref 26–34)
MCHC RBC AUTO-ENTMCNC: 33.8 G/DL (ref 31–36)
MCV RBC AUTO: 96.5 FL (ref 80–100)
PERFORMED ON: ABNORMAL
PLATELET # BLD AUTO: 230 K/UL (ref 135–450)
PMV BLD AUTO: 8.4 FL (ref 5–10.5)
POTASSIUM SERPL-SCNC: 3.9 MMOL/L (ref 3.5–5.1)
RBC # BLD AUTO: 3.79 M/UL (ref 4.2–5.9)
SODIUM SERPL-SCNC: 135 MMOL/L (ref 136–145)
TRIGL SERPL-MCNC: 69 MG/DL (ref 0–150)
VLDLC SERPL CALC-MCNC: 14 MG/DL
WBC # BLD AUTO: 16.4 K/UL (ref 4–11)

## 2024-02-26 PROCEDURE — 6370000000 HC RX 637 (ALT 250 FOR IP): Performed by: INTERNAL MEDICINE

## 2024-02-26 PROCEDURE — 97530 THERAPEUTIC ACTIVITIES: CPT

## 2024-02-26 PROCEDURE — 6370000000 HC RX 637 (ALT 250 FOR IP): Performed by: NURSE PRACTITIONER

## 2024-02-26 PROCEDURE — 80061 LIPID PANEL: CPT

## 2024-02-26 PROCEDURE — 85027 COMPLETE CBC AUTOMATED: CPT

## 2024-02-26 PROCEDURE — 97166 OT EVAL MOD COMPLEX 45 MIN: CPT

## 2024-02-26 PROCEDURE — 97535 SELF CARE MNGMENT TRAINING: CPT

## 2024-02-26 PROCEDURE — 80048 BASIC METABOLIC PNL TOTAL CA: CPT

## 2024-02-26 PROCEDURE — 93005 ELECTROCARDIOGRAM TRACING: CPT | Performed by: INTERNAL MEDICINE

## 2024-02-26 PROCEDURE — 99222 1ST HOSP IP/OBS MODERATE 55: CPT | Performed by: INTERNAL MEDICINE

## 2024-02-26 PROCEDURE — 6360000002 HC RX W HCPCS: Performed by: INTERNAL MEDICINE

## 2024-02-26 PROCEDURE — 1200000000 HC SEMI PRIVATE

## 2024-02-26 PROCEDURE — 2580000003 HC RX 258: Performed by: INTERNAL MEDICINE

## 2024-02-26 PROCEDURE — 93010 ELECTROCARDIOGRAM REPORT: CPT | Performed by: INTERNAL MEDICINE

## 2024-02-26 PROCEDURE — 97163 PT EVAL HIGH COMPLEX 45 MIN: CPT

## 2024-02-26 RX ORDER — MECOBALAMIN 5000 MCG
5 TABLET,DISINTEGRATING ORAL NIGHTLY
Status: DISCONTINUED | OUTPATIENT
Start: 2024-02-26 | End: 2024-03-06 | Stop reason: HOSPADM

## 2024-02-26 RX ADMIN — Medication 5 MG: at 23:07

## 2024-02-26 RX ADMIN — SODIUM CHLORIDE, PRESERVATIVE FREE 10 ML: 5 INJECTION INTRAVENOUS at 07:57

## 2024-02-26 RX ADMIN — ATORVASTATIN CALCIUM 20 MG: 10 TABLET, FILM COATED ORAL at 07:57

## 2024-02-26 RX ADMIN — ATENOLOL 25 MG: 25 TABLET ORAL at 19:54

## 2024-02-26 RX ADMIN — ASPIRIN 81 MG 81 MG: 81 TABLET ORAL at 07:57

## 2024-02-26 RX ADMIN — QUETIAPINE FUMARATE 25 MG: 25 TABLET ORAL at 19:54

## 2024-02-26 RX ADMIN — ACETAMINOPHEN 325MG 650 MG: 325 TABLET ORAL at 23:07

## 2024-02-26 RX ADMIN — CLOPIDOGREL BISULFATE 75 MG: 75 TABLET ORAL at 07:57

## 2024-02-26 RX ADMIN — QUETIAPINE FUMARATE 25 MG: 25 TABLET ORAL at 07:57

## 2024-02-26 RX ADMIN — ENOXAPARIN SODIUM 40 MG: 100 INJECTION SUBCUTANEOUS at 07:57

## 2024-02-26 ASSESSMENT — PAIN SCALES - GENERAL
PAINLEVEL_OUTOF10: 0
PAINLEVEL_OUTOF10: 0

## 2024-02-26 NOTE — FLOWSHEET NOTE
02/25/24 2324   Treatment Team Notification   Reason for Communication Change in status  (loss of IV access)   Name of Team Member Notified Aida Marx   Treatment Team Role Advanced Practice Nurse   Method of Communication Secure Message   Response Other (Comment)  (ok to stop IV fluids)   Notification Time 2318     Pt IV access in RAC. Pt agitated and restless, will not keep arm still. IV pump unable to run fluids due to occlusion. Other access attempts made. Aida Marx APRN notified and ok to pause IV fluids until access regained.

## 2024-02-26 NOTE — CARE COORDINATION
Case Management Assessment  Initial Evaluation    Date/Time of Evaluation: 2/26/2024 4:04 PM  Assessment Completed by: Lona Perez RN    If patient is discharged prior to next notation, then this note serves as note for discharge by case management.    Patient Name: Hans Corbin                   YOB: 1929  Diagnosis: Syncope and collapse [R55]  Loss of consciousness (HCC) [R40.20]  Elevated troponin [R79.89]  Bilateral pleural effusion [J90]  Elevated brain natriuretic peptide (BNP) level [R79.89]  Multiple falls [R29.6]  Acute midline low back pain without sciatica [M54.50]                   Date / Time: 2/25/2024  1:54 AM    Patient Admission Status: Inpatient   Readmission Risk (Low < 19, Mod (19-27), High > 27): Readmission Risk Score: 12.6    Current PCP: Reinier Gutierrez MD  PCP verified by CM? Yes (Reinier Gutierrez MD)    Chart Reviewed: Yes      History Provided by: Spouse, Medical Record  Patient Orientation: Person, Unable to Assess (History per spouse)    Patient Cognition: Dementia / Early Alzheimer's    Hospitalization in the last 30 days (Readmission):  No    If yes, Readmission Assessment in  Navigator will be completed.    Advance Directives:      Code Status: Limited   Patient's Primary Decision Maker is: Legal Next of Kin    Primary Decision Maker: Kimmie Corbin - Spouse - [phone]    Secondary Decision Maker: HEAVENLY Maxwell Emerald - Child - [phone]    Discharge Planning:    Patient lives with: Spouse/Significant Other Type of Home: House  Primary Care Giver: Spouse  Patient Support Systems include: Spouse/Significant Other, Children   Current Financial resources: Medicare, Other (Comment) ([facility])  Current community resources: None  Current services prior to admission: Durable Medical Equipment            Current DME: Walker            Type of Home Care services:  PT, OT (Patient's refusing SNF)    ADLS  Prior functional level: Assistance with the following:, Bathing,  and/or patient representative Hans and his family were provided with a choice of provider and agrees with the discharge plan. Freedom of choice list with basic dialogue that supports the patient's individualized plan of care/goals and shares the quality data associated with the providers was provided to:     Patient Representative Name:       The Patient and/or Patient Representative Agree with the Discharge Plan?      Lona Perez RN  Case Management Department  Ph: [phone]

## 2024-02-26 NOTE — PLAN OF CARE
Problem: Discharge Planning  Goal: Discharge to home or other facility with appropriate resources  Outcome: Progressing     Problem: Pain  Goal: Verbalizes/displays adequate comfort level or baseline comfort level  Outcome: Progressing  Flowsheets  Taken 2/26/2024 1400  Verbalizes/displays adequate comfort level or baseline comfort level: Encourage patient to monitor pain and request assistance  Taken 2/26/2024 0745  Verbalizes/displays adequate comfort level or baseline comfort level: Encourage patient to monitor pain and request assistance     Problem: ABCDS Injury Assessment  Goal: Absence of physical injury  Outcome: Progressing     Problem: Skin/Tissue Integrity  Goal: Absence of new skin breakdown  Description: 1.  Monitor for areas of redness and/or skin breakdown  2.  Assess vascular access sites hourly  3.  Every 4-6 hours minimum:  Change oxygen saturation probe site  4.  Every 4-6 hours:  If on nasal continuous positive airway pressure, respiratory therapy assess nares and determine need for appliance change or resting period.  Outcome: Progressing     Problem: Safety - Adult  Goal: Free from fall injury  Outcome: Progressing

## 2024-02-26 NOTE — PLAN OF CARE
Problem: Confusion  Goal: Confusion, delirium, dementia, or psychosis is improved or at baseline  Description: INTERVENTIONS:  1. Assess for possible contributors to thought disturbance, including medications, impaired vision or hearing, underlying metabolic abnormalities, dehydration, psychiatric diagnoses, and notify attending LIP  2. [place] high risk fall precautions, as indicated  3. Provide frequent short contacts to provide reality reorientation, refocusing and direction  4. Decrease environmental stimuli, including noise as appropriate  5. Monitor and intervene to maintain adequate nutrition, hydration, elimination, sleep and activity  6. If unable to ensure safety without constant attention obtain sitter and review sitter guidelines with assigned personnel  7. Initiate Psychosocial CNS and Spiritual Care consult, as indicated  Outcome: Not Progressing     Problem: Discharge Planning  Goal: Discharge to home or other facility with appropriate resources  2/25/2024 2119 by Tracy Barrett RN  Outcome: Progressing  2/25/2024 2119 by Tracy Barrett RN  Outcome: Progressing  Flowsheets (Taken 2/25/2024 2056)  Discharge to home or other facility with appropriate resources: Identify barriers to discharge with patient and caregiver     Problem: Pain  Goal: Verbalizes/displays adequate comfort level or baseline comfort level  2/25/2024 2119 by Tracy Barrett RN  Outcome: Progressing  2/25/2024 2119 by Tracy Barrett RN  Outcome: Progressing  Flowsheets (Taken 2/25/2024 2049)  Verbalizes/displays adequate comfort level or baseline comfort level: Encourage patient to monitor pain and request assistance     Problem: ABCDS Injury Assessment  Goal: Absence of physical injury  2/25/2024 2119 by Tracy Barrett RN  Outcome: Progressing  2/25/2024 2119 by Tracy Barrett RN  Outcome: Progressing     Problem: Skin/Tissue Integrity  Goal: Absence of new skin breakdown  Description: 1.  Monitor for areas of redness  to maintain adequate nutrition, hydration, elimination, sleep and activity  6. If unable to ensure safety without constant attention obtain sitter and review sitter guidelines with assigned personnel  7. Initiate Psychosocial CNS and Spiritual Care consult, as indicated  Outcome: Not Progressing

## 2024-02-26 NOTE — PROGRESS NOTES
Physical Therapy  Facility/Department: [place] [id number] - MED SURG  Physical Therapy Initial Assessment/treatment    Name: Hans Corbin  : 1929  MRN: [id number]  Date of Service: 2024    Discharge Recommendations:  Subacute/Skilled Nursing Facility   PT Equipment Recommendations  Equipment Needed: No      Therapy discharge recommendations take into account each patient's current medical complexities and are subject to input/oversight from the patient's healthcare team.   Barriers to Home Discharge:   [] Steps to access home entry or bed/bath:   [x] Unable to transfer, ambulate, or propel wheelchair household distances without assist   [x] Limited available assist at home upon discharge    [] Patient or family requests d/c to post-acute facility    [x] Poor cognition/safety awareness for d/c to home alone    []Unable to maintain ordered weight bearing status    [] Patient with salient signs of long-standing immobility   [x] Patient is at risk for falls due to:   [] Other:    If pt is unable to be seen after this session, please let this note serve as discharge summary.  Please see case management note for discharge disposition.  Thank you.    Patient Diagnosis(es): The primary encounter diagnosis was Syncope and collapse. Diagnoses of Elevated troponin, Multiple falls, Loss of consciousness (HCC), Acute midline low back pain without sciatica, Elevated brain natriuretic peptide (BNP) level, and Bilateral pleural effusion were also pertinent to this visit.  Past Medical History:  has a past medical history of Cancer (HCC), Coronary artery disease, CVA (cerebral vascular accident) (HCC), Hyperlipidemia, Hypertension, and Prostate cancer (HCC).  Past Surgical History:  has a past surgical history that includes Femur fracture surgery (Right, 2020); fracture surgery; Cystoscopy; and hernia repair (N/A, 2022).    Assessment   Body Structures, Functions, Activity Limitations Requiring Skilled Therapeutic

## 2024-02-26 NOTE — CONSULTS
Consult Placed     Who: DONTE PEÑA   Date:2/26/2024  Time:1606     Electronically signed by Karlos Buck on 2/26/2024 at 4:05 PM

## 2024-02-26 NOTE — CONSULTS
medications    Medication Sig Start Date End Date Taking? Authorizing Provider   midodrine (PROAMATINE) 5 MG tablet Take 1 tablet by mouth 3 times daily (with meals) Give at 8am, 12pm, 4pm, Do not give after 1800 or within 4 hrs of bedtime 12/11/20   Andrea Kent MD   atenolol (TENORMIN) 25 MG tablet Take 1 tablet by mouth nightly  Patient taking differently: Take 25 mg by mouth daily  12/9/20   Zhanna Dickinson, APRN - CNP   sennosides-docusate sodium (SENOKOT-S) 8.6-50 MG tablet Take 1 tablet by mouth 2 times daily 12/9/20   Andrea Kent MD   clopidogrel (PLAVIX) 75 MG tablet Take 75 mg by mouth daily    ProviderEllie MD   simvastatin (ZOCOR) 20 MG tablet Take 20 mg by mouth nightly    ProviderEllie MD      CURRENT Medications:  atenolol (TENORMIN) tablet 25 mg, Nightly  clopidogrel (PLAVIX) tablet 75 mg, Daily  [Held by provider] midodrine (PROAMATINE) tablet 5 mg, TID WC  atorvastatin (LIPITOR) tablet 20 mg, Daily  sodium chloride flush 0.9 % injection 5-40 mL, 2 times per day  sodium chloride flush 0.9 % injection 5-40 mL, PRN  0.9 % sodium chloride infusion, PRN  enoxaparin (LOVENOX) injection 40 mg, Daily  ondansetron (ZOFRAN-ODT) disintegrating tablet 4 mg, Q8H PRN   Or  ondansetron (ZOFRAN) injection 4 mg, Q6H PRN  acetaminophen (TYLENOL) tablet 650 mg, Q6H PRN   Or  acetaminophen (TYLENOL) suppository 650 mg, Q6H PRN  polyethylene glycol (GLYCOLAX) packet 17 g, Daily PRN  aspirin chewable tablet 81 mg, Daily  perflutren lipid microspheres (DEFINITY) injection 1.5 mL, ONCE PRN  0.9 % sodium chloride infusion, Continuous  QUEtiapine (SEROQUEL) tablet 25 mg, BID      Allergies:  Patient has no known allergies.     Review of Systems:   A 14 point review of symptoms completed. Pertinent positives identified in the HPI, all other review of symptoms negative as below.      Objective:     Vitals:    02/26/24 0955   BP: 134/69   Pulse: 72   Resp:    Temp:    SpO2: 95%    Weight - Scale:  78.1 kg (172 lb 2.9 oz)       PHYSICAL EXAM:    General:  Alert, no distress, appears stated age   Head:  Normocephalic, atraumatic   Eyes:  Conjunctiva/corneas clear, anicteric sclerae    Nose: Nares normal, no drainage or sinus tenderness   Throat: No abnormalities of the lips, oral mucosa or tongue.    Neck: Trachea midline. Neck supple with no lymphadenopathy, thyroid not enlarged, symmetric, no tenderness/mass/nodules, no Jugular venous pressure elevation    Lungs:   Clear to auscultation bilaterally, no wheezes, no rales, no respiratory distress   Chest Wall:  No deformity or tenderness to palpation, +PORT   Heart:  Regular rate and rhythm, normal S1, normal S2, no murmur, no rub, no S3/S4, PMI non-displaced.    Abdomen:   Soft, non-tender, with normoactive bowel sounds. No masses, no hepatosplenomegaly   Extremities: No cyanosis, clubbing or pitting edema.    Vascular: 2+ radial, brachial, femoral, dorsalis pedis and posterior tibial pulses bilaterally. Brisk carotid upstrokes without carotid bruit.    Skin: Skin color, texture, turgor are normal with no rashes or ulceration.    Pysch: Euthymic mood, appropriate affect   Neurologic: Confused         Labs:   CBC:   Lab Results   Component Value Date/Time    WBC 16.4 02/26/2024 07:14 AM    RBC 3.79 02/26/2024 07:14 AM    HGB 12.4 02/26/2024 07:14 AM    HCT 36.6 02/26/2024 07:14 AM    MCV 96.5 02/26/2024 07:14 AM    RDW 13.2 02/26/2024 07:14 AM     02/26/2024 07:14 AM     CMP:  Lab Results   Component Value Date/Time     02/26/2024 07:14 AM    K 3.9 02/26/2024 07:14 AM    CL 99 02/26/2024 07:14 AM    CO2 20 02/26/2024 07:14 AM    BUN 23 02/26/2024 07:14 AM    CREATININE 1.1 02/26/2024 07:14 AM    GFRAA 53 04/20/2022 11:40 AM    AGRATIO 1.4 02/25/2024 02:31 AM    LABGLOM >60 02/26/2024 07:14 AM    GLUCOSE 107 02/26/2024 07:14 AM    PROT 5.7 02/25/2024 02:31 AM    CALCIUM 8.5 02/26/2024 07:14 AM    BILITOT 1.0 02/25/2024 02:31 AM    ALKPHOS 91

## 2024-02-26 NOTE — CARE COORDINATION
[facility]    Referral received from  to follow for home care services.   I will follow for needs, and speak with patient to verify demos.    Pt/ot; interested in private pay    Swetha Rand RN, BSN -[phone]  [facility]   [phone] fax [phone]  [facility] -[phone]  [facility] -[phone]

## 2024-02-26 NOTE — PROGRESS NOTES
Hospital Medicine Progress Note      Date of Admission: 2/25/2024  Hospital Day: 2    Chief Admission Complaint:  Weakness, increased confusion, fall possible syncope      Subjective:  reseting in bed, appears pleasantly confused    Presenting Admission History:         89 y.o. male  with PMH significant for HTN, Hyperlipidemia, CAD, dementia.  History of present illness is limited as patient is a very poor historian and his wife who is present is also a very poor historian     He was brought to the ED by EMS after his wife called them.    Per report he has been having increased weakness, increased confusion and multiple falls at home.  There was what was described a syncopal episode while he was in the toilet and trying to get up off the toilet.  He does have complaint of lower back pain.  Wife describes he is getting increased confused, increased weakness and having multiple falls at home.  She is having difficult time taking care of him and brought him to the ED for further evaluation.  Workup in the ED did include CT lumbar spine which did reveal compression fractures likely chronic.  CT scan of the head revealed no acute intracranial abnormality.  Chest x-ray also done.  There is no report of chest pain or sob.      He not safe to be discharged home.  Wife states she is not able to care for him and she has concern that  he would  fall and sustain injuries.  He is now admitted for further evaluation management       Assessment/Plan:      Current Principal Problem:  Syncope and collapse      Syncope : Uncertain if there was true syncope or he is very weak and falling.  Per wife there is more falling and weakness.   He was admitted and followed on telemetry to exclude any malignant arrhythmia.     Weakness and deconditioning: He does appear very weak and frail.  Per wife he is having increased walking at home and having falls.   Will ask PT OT to evaluate   -cards consulted given elevated bnp/troponin  -echo

## 2024-02-26 NOTE — CONSULTS
Consult Placed     Who: AJAY VERA   Date:2/26/2024  Time:0933     Electronically signed by Karlos Buck on 2/26/2024 at 9:33 AM

## 2024-02-26 NOTE — PROGRESS NOTES
Occupational Therapy  Facility/Department: [place] [address] - MED SURG  Occupational Therapy Initial Assessment    Name: Hans Corbin  : 1929  MRN: [id number]  Date of Service: 2024    Discharge Recommendations:  Subacute/Skilled Nursing Facility  OT Equipment Recommendations  Equipment Needed: No (defer)     Therapy discharge recommendations are subject to collaboration from the patient’s interdisciplinary healthcare team, including MD and case management recommendations.    Barriers to Home Discharge:   [] Steps to access home entry or bed/bath:   [x] Unable to transfer, ambulate, or propel wheelchair household distances without assist   [x] Limited available assist at home upon discharge    [] Patient or family requests d/c to post-acute facility    [x] Poor cognition/safety awareness for d/c to home alone    [] Unable to maintain ordered weight bearing status    [] Patient with salient signs of long-standing immobility   [x] Decreased independence with ADLs   [x] Increased risk for falls   [] Other:    If pt is unable to be seen after this session, please let this note serve as discharge summary.  Please see case management note for discharge disposition.  Thank you.    Patient Diagnosis(es): The primary encounter diagnosis was Syncope and collapse. Diagnoses of Elevated troponin, Multiple falls, Loss of consciousness (HCC), Acute midline low back pain without sciatica, Elevated brain natriuretic peptide (BNP) level, and Bilateral pleural effusion were also pertinent to this visit.  Past Medical History:  has a past medical history of Cancer (HCC), Coronary artery disease, CVA (cerebral vascular accident) (HCC), Hyperlipidemia, Hypertension, and Prostate cancer (HCC).  Past Surgical History:  has a past surgical history that includes Femur fracture surgery (Right, 2020); fracture surgery; Cystoscopy; and hernia repair (N/A, 2022).    Assessment   Performance deficits / Impairments: Decreased  understanding    AM-PAC - ADL  AM-PAC Daily Activity - Inpatient   How much help is needed for putting on and taking off regular lower body clothing?: Total  How much help is needed for bathing (which includes washing, rinsing, drying)?: Total  How much help is needed for toileting (which includes using toilet, bedpan, or urinal)?: Total  How much help is needed for putting on and taking off regular upper body clothing?: Total  How much help is needed for taking care of personal grooming?: Total  How much help for eating meals?: Total  AM-PAC Inpatient Daily Activity Raw Score: 6  AM-PAC Inpatient ADL T-Scale Score : 17.07  ADL Inpatient CMS 0-100% Score: 100  ADL Inpatient CMS G-Code Modifier : CN    Goals  Short Term Goals  Time Frame for Short Term Goals: 3/5/24 unless noted  Short Term Goal 1: Pt will complete functional/ toilet transfer mod x1 + LRAD  Short Term Goal 2: Pt will complete LB dress mod A  Short Term Goal 3: Pt will complete x2 grooming tasks in supported sitting w/ min A -- by 3/2/24  Short Term Goal 4: Pt will perform x10 reps BUE therex for inc strengthening in prep for transfers / dynamic ADLs  Short Term Goal 5: Pt will follow commands 50% of session to demo inc awareness / orientation to situation in prep for engagement in functional tasks  Patient Goals   Patient goals : unable to state       Therapy Time   Individual Concurrent Group Co-treatment   Time In       0951   Time Out       1024   Minutes       33   Timed Code Treatment Minutes: 23 Minutes (10 min eval)       Bari Ch OT

## 2024-02-26 NOTE — CONSULTS
PALLIATIVE MEDICINE CONSULTATION     Patient name:Hans Corbin   MRN:[id number]    :1929  Room/Bed:0364/0364-01   LOS: 1 day         Date of consult:2024    Inpatient consult to Palliative Care  Consult performed by: Rosa Stone APRN - CNP  Consult ordered by: Andrea Knet MD              ASSESSMENT/RECOMMENDATIONS     89 y.o. male with dementia and frequent falls admitted for syncope      Symptom Management:  Goals of Care- Likely comfort -focussed.  Wife states several times she wants patient to be comfortable and does not see any sense in putting patient thorough any major interventions. She will not consider SNF/LTC but would consider HH.  She is currently calling around to try to find private-pay help to come into the home and assist with patient. Discussed option for comfort care and potential hospice, but she wants time to think about it and to talk with her son before making any changes.  She is agreeable to a follow up call tomorrow about it.  Code status changed to Limited x 4 Nos.  Will follow    Debility - due to multiple co-morbid medical conditions, physical deconditioning, AMS.  Lives at home with wife, uses a walker, multiple falls.  Inconsistent appetite  Albumin and total protein both low at 3.3 and 5.7. BMI 23.  Confusion reportedly getting more frequent and severe. Has behavioral issues at times which wife tries to manage.  Wife does not feel like she can handle patient alone anymore, wants help in the home and is very resistant to place in SNF or LTC . PT/OT with recs for SNF    Syncope - Cardiology consulted.  Per wife, this was not syncope and was due to extreme weakness.   Wife does not want extensive work up due to patient's advanced age       Patient/Family Goals of Care :    24    Patient is not decisional.  No family at bedside.  Called and spoke with wife.  Explained role of Palliative Care. Wife agreeable to discussion with me today.  Wife  minimal; Drowsy/coma   [] 10%  Bed bound; Extensive disease; Total care; Mouth care only; Drowsy/coma   []  0%   Death       Home med list and hospital medications reviewed in chart as of 2/26/2024     EXAM     Vitals:    02/26/24 0955   BP: 134/69   Pulse: 72   Resp:    Temp:    SpO2: 95%                OBJECTIVE   /69   Pulse 72   Temp 99.8 °F (37.7 °C) (Axillary)   Resp 19   Ht 1.829 m (6')   Wt 78.1 kg (172 lb 2.9 oz)   SpO2 95%   BMI 23.35 kg/m²   I/O last 3 completed shifts:  In: -   Out: 500 [Urine:500]  No intake/output data recorded.      Palliative Medicine Interventions:    patient/family support  Goals of Care discussions with patient/surrogate  Spiritual Interventions: none identified          DATA:  Current labs in the epic chart reviewed as of 2/26/2024   Review of previous notes, admits, labs, radiology and testing relevant to this consult done in this chart today 2/26/2024  Review of advance directives (if any) in chart    Medical Decision Making:  The following items were considered in medical decision making:  Review of prior external note(s) from each unique source relevant to today's visit: Hospitalist, Case management, PT/OT/cardiology  Discussion of management or test with external physician/qualified health care professional: Hospitalist, Case management,    Unique test results reviewed: CBC and BMP, Nutrition labs, Hepatic studies, cardiac labs, CXR, echo, CT H, CT Cspine       Risk of Complications/Morbidity: High   Illness(es)/ Infection present that pose threat to bodily function.   There is potential for severe exacerbation of condition/side effects of treatment.                         Signed By: Electronically signed by MICHAEL Olguin CNP on 2/26/2024 at 1:21 PM  Palliative Medicine   [phone]    February 26, 2024

## 2024-02-26 NOTE — CONSULTS
Consult Placed     Who: CARDIOLOGY, [facility]   Date:2/26/2024  Time:0931     Electronically signed by Karlos Buck on 2/26/2024 at 9:31 AM

## 2024-02-27 PROCEDURE — 0HBRXZZ EXCISION OF TOE NAIL, EXTERNAL APPROACH: ICD-10-PCS | Performed by: PODIATRIST

## 2024-02-27 PROCEDURE — 93306 TTE W/DOPPLER COMPLETE: CPT

## 2024-02-27 PROCEDURE — 1200000000 HC SEMI PRIVATE

## 2024-02-27 PROCEDURE — 6370000000 HC RX 637 (ALT 250 FOR IP): Performed by: INTERNAL MEDICINE

## 2024-02-27 PROCEDURE — 6370000000 HC RX 637 (ALT 250 FOR IP): Performed by: NURSE PRACTITIONER

## 2024-02-27 PROCEDURE — 6360000002 HC RX W HCPCS: Performed by: INTERNAL MEDICINE

## 2024-02-27 RX ORDER — OLANZAPINE 10 MG/2ML
5 INJECTION, POWDER, FOR SOLUTION INTRAMUSCULAR ONCE
Status: DISCONTINUED | OUTPATIENT
Start: 2024-02-27 | End: 2024-03-06 | Stop reason: HOSPADM

## 2024-02-27 RX ADMIN — ATORVASTATIN CALCIUM 20 MG: 10 TABLET, FILM COATED ORAL at 11:08

## 2024-02-27 RX ADMIN — ATENOLOL 25 MG: 25 TABLET ORAL at 23:16

## 2024-02-27 RX ADMIN — ASPIRIN 81 MG 81 MG: 81 TABLET ORAL at 11:08

## 2024-02-27 RX ADMIN — CLOPIDOGREL BISULFATE 75 MG: 75 TABLET ORAL at 11:08

## 2024-02-27 RX ADMIN — Medication 5 MG: at 23:20

## 2024-02-27 RX ADMIN — QUETIAPINE FUMARATE 25 MG: 25 TABLET ORAL at 11:08

## 2024-02-27 RX ADMIN — QUETIAPINE FUMARATE 25 MG: 25 TABLET ORAL at 23:16

## 2024-02-27 RX ADMIN — ENOXAPARIN SODIUM 40 MG: 100 INJECTION SUBCUTANEOUS at 11:11

## 2024-02-27 ASSESSMENT — PAIN SCALES - GENERAL: PAINLEVEL_OUTOF10: 0

## 2024-02-27 NOTE — PLAN OF CARE
Problem: Discharge Planning  Goal: Discharge to home or other facility with appropriate resources  Outcome: Progressing     Problem: Pain  Goal: Verbalizes/displays adequate comfort level or baseline comfort level  Outcome: Progressing     Problem: ABCDS Injury Assessment  Goal: Absence of physical injury  Outcome: Progressing     Problem: ABCDS Injury Assessment  Goal: Absence of physical injury  Outcome: Progressing     Problem: Skin/Tissue Integrity  Goal: Absence of new skin breakdown  Description: 1.  Monitor for areas of redness and/or skin breakdown  2.  Assess vascular access sites hourly  3.  Every 4-6 hours minimum:  Change oxygen saturation probe site  4.  Every 4-6 hours:  If on nasal continuous positive airway pressure, respiratory therapy assess nares and determine need for appliance change or resting period.  Outcome: Progressing     Problem: Safety - Adult  Goal: Free from fall injury  Outcome: Progressing     Problem: Chronic Conditions and Co-morbidities  Goal: Patient's chronic conditions and co-morbidity symptoms are monitored and maintained or improved  Outcome: Progressing     Problem: Safety - Medical Restraint  Goal: Remains free of injury from restraints (Restraint for Interference with Medical Device)  Description: INTERVENTIONS:  1. Determine that other, less restrictive measures have been tried or would not be effective before applying the restraint  2. Evaluate the patient's condition at the time of restraint application  3. Inform patient/family regarding the reason for restraint  4. Q2H: Monitor safety, psychosocial status, comfort, nutrition and hydration  Outcome: Progressing  Flowsheets (Taken 2/27/2024 0800)  Remains free of injury from restraints (restraint for interference with medical device): Every 2 hours: Monitor safety, psychosocial status, comfort, nutrition and hydration     Problem: Neurosensory - Adult  Goal: Achieves stable or improved neurological status  Outcome:  Progressing  Goal: Absence of seizures  Outcome: Progressing  Goal: Remains free of injury related to seizures activity  Outcome: Progressing  Goal: Achieves maximal functionality and self care  Outcome: Progressing     Problem: Respiratory - Adult  Goal: Achieves optimal ventilation and oxygenation  Outcome: Progressing     Problem: Skin/Tissue Integrity - Adult  Goal: Skin integrity remains intact  Outcome: Progressing  Goal: Incisions, wounds, or drain sites healing without S/S of infection  Outcome: Progressing  Goal: Oral mucous membranes remain intact  Outcome: Progressing     Problem: Musculoskeletal - Adult  Goal: Return mobility to safest level of function  Outcome: Progressing  Goal: Maintain proper alignment of affected body part  Outcome: Progressing  Goal: Return ADL status to a safe level of function  Outcome: Progressing     Problem: Genitourinary - Adult  Goal: Absence of urinary retention  Outcome: Progressing  Goal: Urinary catheter remains patent  Outcome: Progressing     Problem: Confusion  Goal: Confusion, delirium, dementia, or psychosis is improved or at baseline  Description: INTERVENTIONS:  1. Assess for possible contributors to thought disturbance, including medications, impaired vision or hearing, underlying metabolic abnormalities, dehydration, psychiatric diagnoses, and notify attending LIP  2. [place] high risk fall precautions, as indicated  3. Provide frequent short contacts to provide reality reorientation, refocusing and direction  4. Decrease environmental stimuli, including noise as appropriate  5. Monitor and intervene to maintain adequate nutrition, hydration, elimination, sleep and activity  6. If unable to ensure safety without constant attention obtain sitter and review sitter guidelines with assigned personnel  7. Initiate Psychosocial CNS and Spiritual Care consult, as indicated  Outcome: Progressing

## 2024-02-27 NOTE — CARE COORDINATION
Chart review day 2- pt from home with wife. Pt is a x2 assist for supine to sit in bed. PT/OT rec SNF. Wife in conversation with palliative. They have not been able to reach her today. If SNF is desired, pt will not need precert.

## 2024-02-27 NOTE — PROGRESS NOTES
[facility] Medicine Progress Note      Date of Admission: 2/25/2024  Hospital Day: 3      Chief Admission Complaint:  Weakness, increased confusion, fall possible syncope      Subjective:  resting in bed, appears pleasantly confused, remains in restraints, was combative with nurses in last 12h     Presenting Admission History:          89 y.o. male  with PMH significant for HTN, Hyperlipidemia, CAD, dementia.  History of present illness is limited as patient is a very poor historian and his wife who is present is also a very poor historian     He was brought to the ED by EMS after his wife called them.    Per report he has been having increased weakness, increased confusion and multiple falls at home.  There was what was described a syncopal episode while he was in the toilet and trying to get up off the toilet.  He does have complaint of lower back pain.  Wife describes he is getting increased confused, increased weakness and having multiple falls at home.  She is having difficult time taking care of him and brought him to the ED for further evaluation.  Workup in the ED did include CT lumbar spine which did reveal compression fractures likely chronic.  CT scan of the head revealed no acute intracranial abnormality.  Chest x-ray also done.  There is no report of chest pain or sob.      He not safe to be discharged home.  Wife states she is not able to care for him and she has concern that  he would  fall and sustain injuries.  He is now admitted for further evaluation management        Assessment/Plan:       Current Principal Problem:  Syncope and collapse       Syncope : Uncertain if there was true syncope or he is very weak and falling.  Per wife there is more falling and weakness.   He was admitted and followed on telemetry to exclude any malignant arrhythmia.     Weakness and deconditioning: He does appear very weak and frail.  Per wife he is having increased walking at home and having falls.   -asked PT OT to  results for input(s): \"INR\", \"LACTA\", \"TSH\" in the last 72 hours.    Urine Cultures: No results found for: \"LABURIN\"  Blood Cultures:   Lab Results   Component Value Date/Time    BC No growth after 5 days of incubation. 03/29/2018 07:15 AM     Lab Results   Component Value Date/Time    BLOODCULT2 See additional report for complete BCID panel. 03/29/2018 07:44 AM    BLOODCULT2  03/29/2018 07:44 AM     POSITIVE for  This organism was isolated from one bottle only.  Susceptibility testing is not routinely done as this  organism frequently represents skin contamination.  Additional testing can be ordered by calling the  Microbiology Department within 30 days.       Organism:   Lab Results   Component Value Date/Time    ORG Staphylococcus coagulase negative DNA Detected 03/29/2018 07:44 AM    ORG Staphylococcus coagulase-negative 03/29/2018 07:44 AM         Andrea Kent MD

## 2024-02-27 NOTE — PROGRESS NOTES
PALLIATIVE MEDICINE PROGRESS NOTE     Patient name:Hans Corbin    MRN:[id number] :1929  Room/Bed:0364/0364-01    LOS: 2 days        ASSESSMENT/RECOMMENDATIONS     89 y.o. male with  dementia and frequent falls admitted for syncope     Symptom management     1) Goals of care:  Likely comfort -focussed.  Wife stated several times she wants patient to be comfortable and does not see any sense in putting patient thorough any major interventions. She will not consider SNF/LTC but would consider HH.    Discussed option for comfort care and potential hospice, but she wanted time to think about it and to talk with her son before making any changes.   Unable to reach either so far for follow up discussion today. Code status is Limited x 4 Nos.  Will follow     2) Debility - due to multiple co-morbid medical conditions, physical deconditioning, AMS. Lives at home with wife, uses a walker, multiple falls. Inconsistent appetite Albumin and total protein both low at 3.3 and 5.7. BMI 23. Confusion reportedly getting more frequent and severe. Has behavioral issues at times which wife tries to manage. Wife does not feel like she can handle patient alone anymore, wants help in the home and is very resistant to place in SNF or LTC . PT/OT with recs for SNF     3) Syncope -Cardiology consulted. Per wife, this was not syncope and was due to extreme weakness. Wife does not want extensive work up due to patient's advanced age. Echo this morning with EF 40-45% with hypokinesis of the apex, apical anterior, inferior, basal inferior and apical anterior walls as well as Grade I diastolic dysfunction.          Patient/Family Goals of Care :    24    Called wife x 2 today and left messages.  Called son, Sadi, and left message.  Will continue to try to reach    24     Patient is not decisional.  No family at bedside.  Called and spoke with wife.  Explained role of Palliative Care. Wife agreeable to discussion with me today.

## 2024-02-27 NOTE — PLAN OF CARE
Problem: Discharge Planning  Goal: Discharge to home or other facility with appropriate resources  2/27/2024 0044 by Minerva Carney RN  Outcome: Progressing  Flowsheets (Taken 2/26/2024 1948)  Discharge to home or other facility with appropriate resources: Identify barriers to discharge with patient and caregiver     Problem: Pain  Goal: Verbalizes/displays adequate comfort level or baseline comfort level  2/27/2024 0044 by Minerva Carney RN  Outcome: Progressing     Problem: ABCDS Injury Assessment  Goal: Absence of physical injury  2/27/2024 0044 by Minerva Carney RN  Outcome: Progressing     Problem: Skin/Tissue Integrity  Goal: Absence of new skin breakdown  Description: 1.  Monitor for areas of redness and/or skin breakdown  2.  Assess vascular access sites hourly  3.  Every 4-6 hours minimum:  Change oxygen saturation probe site  4.  Every 4-6 hours:  If on nasal continuous positive airway pressure, respiratory therapy assess nares and determine need for appliance change or resting period.  2/27/2024 0044 by Minerva Carney RN  Outcome: Progressing     Problem: Safety - Adult  Goal: Free from fall injury  2/27/2024 0044 by Minerva Carney RN  Outcome: Progressing     Problem: Chronic Conditions and Co-morbidities  Goal: Patient's chronic conditions and co-morbidity symptoms are monitored and maintained or improved  Outcome: Progressing  Flowsheets (Taken 2/26/2024 1948)  Care Plan - Patient's Chronic Conditions and Co-Morbidity Symptoms are Monitored and Maintained or Improved: Monitor and assess patient's chronic conditions and comorbid symptoms for stability, deterioration, or improvement     Problem: Neurosensory - Adult  Goal: Achieves stable or improved neurological status  Outcome: Progressing  Flowsheets (Taken 2/26/2024 1948)  Achieves stable or improved neurological status: Assess for and report changes in neurological status     Problem: Neurosensory - Adult  Goal: Absence of seizures  Outcome:  mobility to safest level of function  Outcome: Progressing  Flowsheets (Taken 2/26/2024 1948)  Return Mobility to Safest Level of Function: Assess patient stability and activity tolerance for standing, transferring and ambulating with or without assistive devices     Problem: Musculoskeletal - Adult  Goal: Maintain proper alignment of affected body part  Outcome: Progressing  Flowsheets (Taken 2/26/2024 1948)  Maintain proper alignment of affected body part: Support and protect limb and body alignment per provider's orders     Problem: Musculoskeletal - Adult  Goal: Return ADL status to a safe level of function  Outcome: Progressing  Flowsheets (Taken 2/26/2024 1948)  Return ADL Status to a Safe Level of Function: Administer medication as ordered     Problem: Genitourinary - Adult  Goal: Absence of urinary retention  Outcome: Progressing  Flowsheets (Taken 2/26/2024 1948)  Absence of urinary retention: Assess patient’s ability to void and empty bladder     Problem: Genitourinary - Adult  Goal: Urinary catheter remains patent  Outcome: Progressing  Flowsheets (Taken 2/26/2024 1948)  Urinary catheter remains patent: Assess patency of urinary catheter     Problem: Confusion  Goal: Confusion, delirium, dementia, or psychosis is improved or at baseline  Description: INTERVENTIONS:  1. Assess for possible contributors to thought disturbance, including medications, impaired vision or hearing, underlying metabolic abnormalities, dehydration, psychiatric diagnoses, and notify attending LIP  2. [place] high risk fall precautions, as indicated  3. Provide frequent short contacts to provide reality reorientation, refocusing and direction  4. Decrease environmental stimuli, including noise as appropriate  5. Monitor and intervene to maintain adequate nutrition, hydration, elimination, sleep and activity  6. If unable to ensure safety without constant attention obtain sitter and review sitter guidelines with assigned
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
## 2024-02-27 NOTE — CONSULTS
Podiatry Consult Note      Reason for Consult:  Onychomycosis  Requesting Physician:  Hospitalist    Chief Complaint:  Onychomycosis    History of Present Illness:              The patient is a 89 y.o. male with significant past medical history of CVA, HTN, HLD. Podiatry consulted for elongated and painful toenails bilateral foot. Patient in soft restraints. He is sleeping, difficult to fully wake.    Past Medical History:        Diagnosis Date    Cancer (HCC)     Bladder CA status post chemo/radiation 2018    Coronary artery disease     PCI 2007    CVA (cerebral vascular accident) (HCC) 2002    Hyperlipidemia     Hypertension     Prostate cancer (HCC)     Tx seeds       Past Surgical History:        Procedure Laterality Date    CYSTOSCOPY      few cysto    FEMUR FRACTURE SURGERY Right 11/30/2020    FEMUR INTRAMEDULLARY NAIL BRITTNEY INSERTION RIGHT performed by Emy Renae DO at [facility] OR    FRACTURE SURGERY      HERNIA REPAIR N/A 4/20/2022    ROBOTIC RIGHT INGUINAL HERNIA REPAIR WITH MESH performed by Jone Sánchez MD at [facility] OR       Current Medications:    Current Facility-Administered Medications: melatonin disintegrating tablet 5 mg, 5 mg, Oral, Nightly  atenolol (TENORMIN) tablet 25 mg, 25 mg, Oral, Nightly  clopidogrel (PLAVIX) tablet 75 mg, 75 mg, Oral, Daily  [Held by provider] midodrine (PROAMATINE) tablet 5 mg, 5 mg, Oral, TID WC  atorvastatin (LIPITOR) tablet 20 mg, 20 mg, Oral, Daily  sodium chloride flush 0.9 % injection 5-40 mL, 5-40 mL, IntraVENous, 2 times per day  sodium chloride flush 0.9 % injection 5-40 mL, 5-40 mL, IntraVENous, PRN  0.9 % sodium chloride infusion, , IntraVENous, PRN  enoxaparin (LOVENOX) injection 40 mg, 40 mg, SubCUTAneous, Daily  ondansetron (ZOFRAN-ODT) disintegrating tablet 4 mg, 4 mg, Oral, Q8H PRN **OR** ondansetron (ZOFRAN) injection 4 mg, 4 mg, IntraVENous, Q6H PRN  acetaminophen (TYLENOL) tablet 650 mg, 650 mg, Oral, Q6H PRN **OR** acetaminophen (TYLENOL)  suppository 650 mg, 650 mg, Rectal, Q6H PRN  polyethylene glycol (GLYCOLAX) packet 17 g, 17 g, Oral, Daily PRN  aspirin chewable tablet 81 mg, 81 mg, Oral, Daily  perflutren lipid microspheres (DEFINITY) injection 1.5 mL, 1.5 mL, IntraVENous, ONCE PRN  0.9 % sodium chloride infusion, , IntraVENous, Continuous  QUEtiapine (SEROQUEL) tablet 25 mg, 25 mg, Oral, BID    Allergies:  Patient has no known allergies.    Social History:      Family History:     Review of Systems:    Review of systems not obtained due to patient factors - mental status    Physical Exam:    Vitals:    BP (!) 148/78   Pulse 66   Temp 97.6 °F (36.4 °C) (Axillary)   Resp 16   Ht 1.829 m (6')   Wt 78.1 kg (172 lb 2.9 oz)   SpO2 95%   BMI 23.35 kg/m²   Integument:  Elongated, thickened, discolored, with subungual debridement. Responds to painful stimuli during debridement.  Neurologic:  Protective sensation is grossly diminished to light touch at the level of the toes, bilateral.  Vascular:  DP and PT pulses are palpable, bilateral.  CFT is brisk to all toes.  No significant edema appreciated.  Musculoskeletal: No gross musculoskeletal deformities noted.    Labs:  CBC with Differential:    Lab Results   Component Value Date/Time    WBC 16.4 02/26/2024 07:14 AM    RBC 3.79 02/26/2024 07:14 AM    HGB 12.4 02/26/2024 07:14 AM    HCT 36.6 02/26/2024 07:14 AM     02/26/2024 07:14 AM    MCV 96.5 02/26/2024 07:14 AM    MCH 32.6 02/26/2024 07:14 AM    MCHC 33.8 02/26/2024 07:14 AM    RDW 13.2 02/26/2024 07:14 AM    LYMPHOPCT 7.3 02/25/2024 02:31 AM    MONOPCT 7.4 02/25/2024 02:31 AM    BASOPCT 0.5 02/25/2024 02:31 AM    MONOSABS 1.0 02/25/2024 02:31 AM    LYMPHSABS 1.0 02/25/2024 02:31 AM    EOSABS 0.1 02/25/2024 02:31 AM    BASOSABS 0.1 02/25/2024 02:31 AM     CMP:    Lab Results   Component Value Date/Time     02/26/2024 07:14 AM    K 3.9 02/26/2024 07:14 AM    CL 99 02/26/2024 07:14 AM    CO2 20 02/26/2024 07:14 AM    BUN 23  Message  Office: [phone]

## 2024-02-28 LAB
ALBUMIN SERPL-MCNC: 3.2 G/DL (ref 3.4–5)
ANION GAP SERPL CALCULATED.3IONS-SCNC: 12 MMOL/L (ref 3–16)
BASOPHILS # BLD: 0.1 K/UL (ref 0–0.2)
BASOPHILS NFR BLD: 0.6 %
BUN SERPL-MCNC: 38 MG/DL (ref 7–20)
CALCIUM SERPL-MCNC: 8.5 MG/DL (ref 8.3–10.6)
CHLORIDE SERPL-SCNC: 102 MMOL/L (ref 99–110)
CO2 SERPL-SCNC: 25 MMOL/L (ref 21–32)
CREAT SERPL-MCNC: 1.1 MG/DL (ref 0.8–1.3)
DEPRECATED RDW RBC AUTO: 12.9 % (ref 12.4–15.4)
EOSINOPHIL # BLD: 0.3 K/UL (ref 0–0.6)
EOSINOPHIL NFR BLD: 2.2 %
GFR SERPLBLD CREATININE-BSD FMLA CKD-EPI: >60 ML/MIN/{1.73_M2}
GLUCOSE SERPL-MCNC: 104 MG/DL (ref 70–99)
HCT VFR BLD AUTO: 36.2 % (ref 40.5–52.5)
HGB BLD-MCNC: 12.2 G/DL (ref 13.5–17.5)
LYMPHOCYTES # BLD: 1.1 K/UL (ref 1–5.1)
LYMPHOCYTES NFR BLD: 9.5 %
MAGNESIUM SERPL-MCNC: 2 MG/DL (ref 1.8–2.4)
MCH RBC QN AUTO: 32.3 PG (ref 26–34)
MCHC RBC AUTO-ENTMCNC: 33.7 G/DL (ref 31–36)
MCV RBC AUTO: 95.7 FL (ref 80–100)
MONOCYTES # BLD: 1.2 K/UL (ref 0–1.3)
MONOCYTES NFR BLD: 10.1 %
NEUTROPHILS # BLD: 9.3 K/UL (ref 1.7–7.7)
NEUTROPHILS NFR BLD: 77.6 %
PHOSPHATE SERPL-MCNC: 3.2 MG/DL (ref 2.5–4.9)
PLATELET # BLD AUTO: 252 K/UL (ref 135–450)
PMV BLD AUTO: 8.7 FL (ref 5–10.5)
POTASSIUM SERPL-SCNC: 3.2 MMOL/L (ref 3.5–5.1)
PROCALCITONIN SERPL IA-MCNC: 0.18 NG/ML (ref 0–0.15)
RBC # BLD AUTO: 3.78 M/UL (ref 4.2–5.9)
SODIUM SERPL-SCNC: 139 MMOL/L (ref 136–145)
WBC # BLD AUTO: 12 K/UL (ref 4–11)

## 2024-02-28 PROCEDURE — 85025 COMPLETE CBC W/AUTO DIFF WBC: CPT

## 2024-02-28 PROCEDURE — 97530 THERAPEUTIC ACTIVITIES: CPT

## 2024-02-28 PROCEDURE — 84145 PROCALCITONIN (PCT): CPT

## 2024-02-28 PROCEDURE — 6370000000 HC RX 637 (ALT 250 FOR IP): Performed by: INTERNAL MEDICINE

## 2024-02-28 PROCEDURE — 80069 RENAL FUNCTION PANEL: CPT

## 2024-02-28 PROCEDURE — 6360000002 HC RX W HCPCS: Performed by: INTERNAL MEDICINE

## 2024-02-28 PROCEDURE — 6370000000 HC RX 637 (ALT 250 FOR IP): Performed by: NURSE PRACTITIONER

## 2024-02-28 PROCEDURE — 83735 ASSAY OF MAGNESIUM: CPT

## 2024-02-28 PROCEDURE — 1200000000 HC SEMI PRIVATE

## 2024-02-28 PROCEDURE — 36415 COLL VENOUS BLD VENIPUNCTURE: CPT

## 2024-02-28 PROCEDURE — 97535 SELF CARE MNGMENT TRAINING: CPT

## 2024-02-28 RX ORDER — POTASSIUM CHLORIDE 7.45 MG/ML
10 INJECTION INTRAVENOUS ONCE
Status: DISCONTINUED | OUTPATIENT
Start: 2024-02-28 | End: 2024-02-28

## 2024-02-28 RX ORDER — DOXYCYCLINE HYCLATE 100 MG
100 TABLET ORAL EVERY 12 HOURS SCHEDULED
Status: COMPLETED | OUTPATIENT
Start: 2024-02-28 | End: 2024-03-04

## 2024-02-28 RX ORDER — AMOXICILLIN AND CLAVULANATE POTASSIUM 500; 125 MG/1; MG/1
1 TABLET, FILM COATED ORAL EVERY 12 HOURS SCHEDULED
Status: COMPLETED | OUTPATIENT
Start: 2024-02-28 | End: 2024-03-04

## 2024-02-28 RX ORDER — LACTOBACILLUS RHAMNOSUS GG 10B CELL
1 CAPSULE ORAL
Status: DISCONTINUED | OUTPATIENT
Start: 2024-02-28 | End: 2024-03-06 | Stop reason: HOSPADM

## 2024-02-28 RX ADMIN — Medication 1 CAPSULE: at 14:35

## 2024-02-28 RX ADMIN — QUETIAPINE FUMARATE 25 MG: 25 TABLET ORAL at 08:39

## 2024-02-28 RX ADMIN — ATENOLOL 25 MG: 25 TABLET ORAL at 22:21

## 2024-02-28 RX ADMIN — ASPIRIN 81 MG 81 MG: 81 TABLET ORAL at 08:39

## 2024-02-28 RX ADMIN — DOXYCYCLINE HYCLATE 100 MG: 100 TABLET, COATED ORAL at 22:21

## 2024-02-28 RX ADMIN — ATORVASTATIN CALCIUM 20 MG: 10 TABLET, FILM COATED ORAL at 08:39

## 2024-02-28 RX ADMIN — Medication 5 MG: at 22:21

## 2024-02-28 RX ADMIN — QUETIAPINE FUMARATE 25 MG: 25 TABLET ORAL at 22:21

## 2024-02-28 RX ADMIN — AMOXICILLIN AND CLAVULANATE POTASSIUM 1 TABLET: 500; 125 TABLET, FILM COATED ORAL at 18:41

## 2024-02-28 RX ADMIN — CLOPIDOGREL BISULFATE 75 MG: 75 TABLET ORAL at 08:39

## 2024-02-28 RX ADMIN — ENOXAPARIN SODIUM 40 MG: 100 INJECTION SUBCUTANEOUS at 08:39

## 2024-02-28 RX ADMIN — POTASSIUM BICARBONATE 40 MEQ: 782 TABLET, EFFERVESCENT ORAL at 14:35

## 2024-02-28 ASSESSMENT — PAIN SCALES - GENERAL
PAINLEVEL_OUTOF10: 0
PAINLEVEL_OUTOF10: 0

## 2024-02-28 NOTE — PLAN OF CARE
Problem: Discharge Planning  Goal: Discharge to home or other facility with appropriate resources  2/28/2024 0113 by Elina Russell RN  Outcome: Progressing  Flowsheets (Taken 2/27/2024 2003)  Discharge to home or other facility with appropriate resources: Identify barriers to discharge with patient and caregiver  2/27/2024 1848 by Trisha Weeks RN  Outcome: Progressing     Problem: Pain  Goal: Verbalizes/displays adequate comfort level or baseline comfort level  2/28/2024 0113 by Elina Russell RN  Outcome: Progressing  Flowsheets (Taken 2/27/2024 2316)  Verbalizes/displays adequate comfort level or baseline comfort level: Assess pain using appropriate pain scale  2/27/2024 1848 by Trisha Weeks RN  Outcome: Progressing     Problem: ABCDS Injury Assessment  Goal: Absence of physical injury  2/28/2024 0113 by Elina Russell RN  Outcome: Progressing  Flowsheets (Taken 2/28/2024 0113)  Absence of Physical Injury: Implement safety measures based on patient assessment  2/27/2024 1848 by Trisha Weeks RN  Outcome: Progressing     Problem: Skin/Tissue Integrity  Goal: Absence of new skin breakdown  Description: 1.  Monitor for areas of redness and/or skin breakdown  2.  Assess vascular access sites hourly  3.  Every 4-6 hours minimum:  Change oxygen saturation probe site  4.  Every 4-6 hours:  If on nasal continuous positive airway pressure, respiratory therapy assess nares and determine need for appliance change or resting period.  2/28/2024 0113 by Elina Russell RN  Outcome: Progressing  Note: Q2 turns and repositioning pt.  2/27/2024 1848 by Trisha Weeks RN  Outcome: Progressing     Problem: Safety - Adult  Goal: Free from fall injury  2/28/2024 0113 by Elina Russell RN  Outcome: Progressing  Flowsheets (Taken 2/28/2024 0113)  Free From Fall Injury: (no caregiver present) --  2/27/2024 1848 by Trisha Weeks RN  Outcome: Progressing     Problem: Chronic Conditions and Co-morbidities  Goal:

## 2024-02-28 NOTE — PROGRESS NOTES
PALLIATIVE MEDICINE PROGRESS NOTE     Patient name:Hans Corbin    MRN:[id number] :1929  Room/Bed:0364/0364-01    LOS: 3 days        ASSESSMENT/RECOMMENDATIONS     89 y.o. male with  dementia and frequent falls admitted for syncope     Symptom management     1) Goals of care:  Discussion ongoing. Son is now involved and confirms they want no aggressive medical interventions for patient, but he thinks they will probably want to try to optimize his function and go to SNF and then re-evaluate goals.   Son will have additional discussion with his mother about SNF vs home health.   Code status is Limited x 4 Nos.  Will follow     2) Debility - due to multiple co-morbid medical conditions, physical deconditioning, AMS. Lives at home with wife, uses a walker, multiple falls. Inconsistent appetite Albumin and total protein both low at 3.3 and 5.7. BMI 23. Confusion reportedly getting more frequent and severe. Has behavioral issues at times which wife tries to manage. Wife does not feel like she can handle patient alone anymore, wants help in the home and is very resistant to place in SNF or LTC . PT/OT with recs for SNF     3) Syncope -Cardiology consulted. Per wife, this was not syncope and was due to extreme weakness. Wife does not want extensive work up due to patient's advanced age. Echo this morning with EF 40-45% with hypokinesis of the apex, apical anterior, inferior, basal inferior and apical anterior walls as well as Grade I diastolic dysfunction.          Patient/Family Goals of Care :    24    Continued to try to reach patient with no answer. Received call back from son.  He is on his way from [place] to  his mom and then come to the hospital.  We talked about different options for his medical care.  Reviewed the conversation I had with his mom.  He acknowledges that she needs more help to care for him at home.  He says they both can't accept their decreasing function and feel \"they can do  information in the electronic health record     Signed By: Electronically signed by MICHAEL Olguin CNP on 2/28/2024 at 10:13 AM   Palliative Medicine   [phone]    February 28, 2024

## 2024-02-28 NOTE — PROGRESS NOTES
Occupational Therapy  Facility/Department: [facility] B[facility] - MED SURG  Daily Treatment Note  NAME: Hans Corbin  : 1929  MRN: [id number]    Date of Service: 2024    Discharge Recommendations:  Subacute/Skilled Nursing Facility  OT Equipment Recommendations  Equipment Needed: No (defer)    Patient Diagnosis(es): The primary encounter diagnosis was Syncope and collapse. Diagnoses of Elevated troponin, Multiple falls, Loss of consciousness (HCC), Acute midline low back pain without sciatica, Elevated brain natriuretic peptide (BNP) level, and Bilateral pleural effusion were also pertinent to this visit.     Assessment    Assessment: Co-tx collaboration this date to safely meet goals and will have better occupational performance outcomes with in a co-treatment than 1:1 session. Pt more alert this session but still pleasantly confused requiring repeated redirection/ orientation to place/ situation. Pt progressing well towards goals this date completing bed mobility max x2, max A progressing to CGA for sitting balance, min x2 progressing to CGA x2 for standing trials in STEDY and max A for self-feeding. Pt progressed to completing functional reaching and BUE/ LE therex seated EOB w/ improved command following noted w/ visual demonstration of task demands. Pt returned to bed at EOS and positioned for comfort w/ needs met and within reach. OT rec d/c SNF, cont acute OT.   Activity Tolerance: Treatment limited secondary to decreased cognition;Patient limited by fatigue;Patient limited by endurance  Discharge Recommendations: Subacute/Skilled Nursing Facility  Equipment Needed: No (defer)      Plan   Occupational Therapy Plan  Times Per Week: x3-4/ week  Current Treatment Recommendations: Strengthening;ROM;Balance training;Functional mobility training;Endurance training;Cognitive reorientation;Pain management;Safety education & training;Patient/Caregiver education & training;Equipment evaluation, education, &  first rep, CGA x2 for safety on 2nd rep from EOB with raul henry)  Stand to Sit: Assist X2;Total assistance;Minimum assistance;Contact-guard assistance (min A x2 progressing to CGA x2 with raul henry)     ADL  Feeding: Maximum assistance  Feeding Skilled Clinical Factors: rodo mgmt  Functional Mobility: Dependent/Total;Adaptive equipment;Increased time to complete  Functional Mobility Skilled Clinical Factors: pt completed x2 STS trials in stedy w/ min x2 progressing to CGA x2 w/ max cues for body mechanics/ safety.  Skin Care: Bath wipes  OT Exercises  Exercise Treatment: pt completed x10 reps bicep curls seated EOB w/ visual demo  Dynamic Sitting Balance Exercises: Pt completed x2 functional reaching w/ BUEs outside min-mod IVY seated EOB w/ min A for balance to achieve alternating lateral leans (specificially towards R). Pt required max cues for initiation and sequencing initially, but noted pt responded better to visual cue for task demands requiring fewer commands for additional reps.     Safety Devices  Type of Devices: All nikhil prominences offloaded;Left in bed;Heels elevated for pressure relief;Patient at risk for falls;Call light within reach;All fall risk precautions in place;Bed alarm in place;Nurse notified;Telesitter in use  Restraints  Restraints Initially in Place: No     Patient Education  Education Given To: Patient  Education Provided: Role of Therapy;Plan of Care;Transfer Training;Fall Prevention Strategies  Education Method: Verbal  Barriers to Learning: Cognition;Hearing  Education Outcome: Unable to demonstrate understanding;Unable to verbalize;Continued education needed  Disease Specific Education: Pt educated on importance of OOB mobility, prevention of complications of bedrest, and general safety during hospitalization. Pt verbalized understanding    Goals  Short Term Goals  Time Frame for Short Term Goals: 3/5/24 unless noted -- all ongoing 2/28  Short Term Goal 1: Pt will complete  functional/ toilet transfer mod x1 + LRAD; progressing 2/28: CG-min x2 STS in STEDY  Short Term Goal 2: Pt will complete LB dress mod A  Short Term Goal 3: Pt will complete x2 grooming tasks in supported sitting w/ min A -- by 3/2/24  Short Term Goal 4: Pt will perform x10 reps BUE therex for inc strengthening in prep for transfers / dynamic ADLs -- progressing 2/28  Short Term Goal 5: Pt will follow commands 50% of session to demo inc awareness / orientation to situation in prep for engagement in functional tasks -- GOAL MET 2/28, DISCONTINUE  Patient Goals   Patient goals : unable to state    AM-PAC - ADL  AM-PAC Daily Activity - Inpatient   How much help is needed for putting on and taking off regular lower body clothing?: Total  How much help is needed for bathing (which includes washing, rinsing, drying)?: Total  How much help is needed for toileting (which includes using toilet, bedpan, or urinal)?: Total  How much help is needed for putting on and taking off regular upper body clothing?: Total  How much help is needed for taking care of personal grooming?: A Lot  How much help for eating meals?: A Lot  AM-PAC Inpatient Daily Activity Raw Score: 8  AM-PAC Inpatient ADL T-Scale Score : 22.86  ADL Inpatient CMS 0-100% Score: 85.69  ADL Inpatient CMS G-Code Modifier : CM    Therapy Time   Individual Concurrent Group Co-treatment   Time In       1449   Time Out       1516   Minutes       27   Timed Code Treatment Minutes: 27 Minutes       Bari Ch OT

## 2024-02-28 NOTE — CARE COORDINATION
JESS met with pt's wife and son, Sadi at bedside. They would like to try SNF and if pt is unable then they would go with hospice. JESS presented SNF list and Kimmie and Sadi decided they would like referral to [facility].     Jess spoke with Didi at [facility]. She is aware of referral. She will review and call CM back.

## 2024-02-28 NOTE — PROGRESS NOTES
Pt. Is disoriented x4, refusing to eat/drink, attempting to get out of bed with restraints in place. Pt. Yelling at the top of his lungs, notified provider, awaiting response.

## 2024-02-28 NOTE — PROGRESS NOTES
Hospital Medicine Progress Note      Date of Admission: 2/25/2024  Hospital Day: 4      Chief Admission Complaint:  Weakness, increased confusion, fall possible syncope      Subjective:  resting in bed, sleeping soundly, out of restraints from 8am today       Presenting Admission History:          89 y.o. male  with PMH significant for HTN, Hyperlipidemia, CAD, dementia.  History of present illness is limited as patient is a very poor historian and his wife who is present is also a very poor historian     He was brought to the ED by EMS after his wife called them.    Per report he has been having increased weakness, increased confusion and multiple falls at home.  There was what was described a syncopal episode while he was in the toilet and trying to get up off the toilet.  He does have complaint of lower back pain.  Wife describes he is getting increased confused, increased weakness and having multiple falls at home.  She is having difficult time taking care of him and brought him to the ED for further evaluation.  Workup in the ED did include CT lumbar spine which did reveal compression fractures likely chronic.  CT scan of the head revealed no acute intracranial abnormality.  Chest x-ray also done.  There is no report of chest pain or sob.      He not safe to be discharged home.  Wife states she is not able to care for him and she has concern that  he would  fall and sustain injuries.  He is now admitted for further evaluation management        Assessment/Plan:       Current Principal Problem:  Syncope and collapse       Syncope : Uncertain if there was true syncope or he is very weak and falling.  Per wife there is more falling and weakness.   He was admitted and followed on telemetry to exclude any malignant arrhythmia.     Weakness and deconditioning: He does appear very weak and frail.  Per wife he is having increased walking at home and having falls.   -asked PT OT to evaluate   -cards consulted given  hours.  No results for input(s): \"INR\", \"LACTA\", \"TSH\" in the last 72 hours.    Urine Cultures: No results found for: \"LABURIN\"  Blood Cultures:   Lab Results   Component Value Date/Time    BC No growth after 5 days of incubation. 03/29/2018 07:15 AM     Lab Results   Component Value Date/Time    BLOODCULT2 See additional report for complete BCID panel. 03/29/2018 07:44 AM    BLOODCULT2  03/29/2018 07:44 AM     POSITIVE for  This organism was isolated from one bottle only.  Susceptibility testing is not routinely done as this  organism frequently represents skin contamination.  Additional testing can be ordered by calling the  Microbiology Department within 30 days.       Organism:   Lab Results   Component Value Date/Time    ORG Staphylococcus coagulase negative DNA Detected 03/29/2018 07:44 AM    ORG Staphylococcus coagulase-negative 03/29/2018 07:44 AM         Andrea Kent MD

## 2024-02-28 NOTE — PROGRESS NOTES
Physical Therapy  Facility/Department: [facility] B[facility] - MED SURG  Daily Treatment Note  NAME: Hans Corbin  : 1929  MRN: [id number]    Date of Service: 2024    Discharge Recommendations:  Subacute/Skilled Nursing Facility   PT Equipment Recommendations  Equipment Needed: No    Patient Diagnosis(es): The primary encounter diagnosis was Syncope and collapse. Diagnoses of Elevated troponin, Multiple falls, Loss of consciousness (HCC), Acute midline low back pain without sciatica, Elevated brain natriuretic peptide (BNP) level, and Bilateral pleural effusion were also pertinent to this visit.    Therapy discharge recommendations take into account each patient's current medical complexities and are subject to input/oversight from the patient's healthcare team.   Barriers to Home Discharge:   [] Steps to access home entry or bed/bath:   [x] Unable to transfer, ambulate, or propel wheelchair household distances without assist   [] Limited available assist at home upon discharge    [x] Patient or family requests d/c to post-acute facility    [x] Poor cognition/safety awareness for d/c to home alone    []Unable to maintain ordered weight bearing status    [x] Patient with salient signs of long-standing immobility   [x] Patient is at risk for falls due to:   [] Other:    If pt is unable to be seen after this session, please let this note serve as discharge summary.  Please see case management note for discharge disposition.  Thank you.      Assessment   Assessment: Patient seen as co-treat with OT d/t significant strength deficits.  Patient cleared by RN for therapy participation this date.  Patient agreeable to therapy. Patient completed supine<>sit with max A x2, sat EOB with max A initially progressing to CGA while completing seated exercises with cues. Pt able to tolerate STS in raul stedy with min A x2 progressing to CGA x2. Deferred transfer to chair d/t safety concerns. P.T .will continue to follow throughout  LOS.  Recommending DC to SNF.  Activity Tolerance: Treatment limited secondary to decreased cognition;Patient limited by fatigue;Patient limited by endurance  Equipment Needed: No     Plan    Physical Therapy Plan  General Plan: 3-5 times per week  Current Treatment Recommendations: Strengthening;ROM;Balance training;Functional mobility training;Transfer training;Endurance training;Neuromuscular re-education;Home exercise program;Gait training;Patient/Caregiver education & training;Equipment evaluation, education, & procurement;Safety education & training;Co-Treatment;Therapeutic activities     Restrictions  Restrictions/Precautions  Restrictions/Precautions: General Precautions, Modified Diet, Fall Risk (carb diet + no caffeine)  Required Braces or Orthoses?: No  Position Activity Restriction  Other position/activity restrictions: restraints, IV, AVASYS     Subjective    Subjective  Subjective: Pt in bed, agreeable to therapy with encouragement  Pain: Pt denies pain  Orientation  Overall Orientation Status: Impaired  Orientation Level: Oriented to person;Disoriented to place;Disoriented to situation;Disoriented to time  Cognition  Overall Cognitive Status: Exceptions  Arousal/Alertness: Delayed responses to stimuli;Inconsistent responses to stimuli  Following Commands: Inconsistently follows commands  Memory: Decreased recall of biographical Information;Decreased recall of precautions;Decreased recall of recent events;Decreased short term memory  Safety Judgement: Decreased awareness of need for assistance;Decreased awareness of need for safety  Problem Solving: Decreased awareness of errors;Assistance required to generate solutions;Assistance required to implement solutions  Insights: Not aware of deficits  Initiation: Requires cues for all  Sequencing: Requires cues for all     Objective   Vitals 163/67, 62 bpm, 96%     Bed Mobility Training  Supine to Sit: Total assistance;Maximum assistance;Assist X2 (max A x2

## 2024-02-28 NOTE — PLAN OF CARE
Problem: Discharge Planning  Goal: Discharge to home or other facility with appropriate resources  Outcome: Progressing     Problem: Pain  Goal: Verbalizes/displays adequate comfort level or baseline comfort level  Outcome: Progressing  Flowsheets (Taken 2/28/2024 0805)  Verbalizes/displays adequate comfort level or baseline comfort level: Assess pain using appropriate pain scale     Problem: ABCDS Injury Assessment  Goal: Absence of physical injury  Outcome: Progressing     Problem: Skin/Tissue Integrity  Goal: Absence of new skin breakdown  Description: 1.  Monitor for areas of redness and/or skin breakdown  2.  Assess vascular access sites hourly  3.  Every 4-6 hours minimum:  Change oxygen saturation probe site  4.  Every 4-6 hours:  If on nasal continuous positive airway pressure, respiratory therapy assess nares and determine need for appliance change or resting period.  Outcome: Progressing     Problem: Safety - Adult  Goal: Free from fall injury  Outcome: Progressing     Problem: Chronic Conditions and Co-morbidities  Goal: Patient's chronic conditions and co-morbidity symptoms are monitored and maintained or improved  Outcome: Progressing     Problem: Safety - Medical Restraint  Goal: Remains free of injury from restraints (Restraint for Interference with Medical Device)  Description: INTERVENTIONS:  1. Determine that other, less restrictive measures have been tried or would not be effective before applying the restraint  2. Evaluate the patient's condition at the time of restraint application  3. Inform patient/family regarding the reason for restraint  4. Q2H: Monitor safety, psychosocial status, comfort, nutrition and hydration  Outcome: Progressing     Problem: Neurosensory - Adult  Goal: Achieves stable or improved neurological status  Outcome: Progressing  Goal: Absence of seizures  Outcome: Progressing  Goal: Remains free of injury related to seizures activity  Outcome: Progressing  Goal: Achieves

## 2024-02-29 LAB
ALBUMIN SERPL-MCNC: 3.1 G/DL (ref 3.4–5)
ANION GAP SERPL CALCULATED.3IONS-SCNC: 10 MMOL/L (ref 3–16)
BASOPHILS # BLD: 0.1 K/UL (ref 0–0.2)
BASOPHILS NFR BLD: 0.7 %
BUN SERPL-MCNC: 38 MG/DL (ref 7–20)
CALCIUM SERPL-MCNC: 8.5 MG/DL (ref 8.3–10.6)
CHLORIDE SERPL-SCNC: 102 MMOL/L (ref 99–110)
CO2 SERPL-SCNC: 26 MMOL/L (ref 21–32)
CREAT SERPL-MCNC: 1 MG/DL (ref 0.8–1.3)
DEPRECATED RDW RBC AUTO: 13.2 % (ref 12.4–15.4)
EOSINOPHIL # BLD: 0.3 K/UL (ref 0–0.6)
EOSINOPHIL NFR BLD: 2.6 %
GFR SERPLBLD CREATININE-BSD FMLA CKD-EPI: >60 ML/MIN/{1.73_M2}
GLUCOSE SERPL-MCNC: 98 MG/DL (ref 70–99)
HCT VFR BLD AUTO: 37.1 % (ref 40.5–52.5)
HGB BLD-MCNC: 12.1 G/DL (ref 13.5–17.5)
LYMPHOCYTES # BLD: 1.3 K/UL (ref 1–5.1)
LYMPHOCYTES NFR BLD: 11.8 %
MAGNESIUM SERPL-MCNC: 2 MG/DL (ref 1.8–2.4)
MCH RBC QN AUTO: 32.1 PG (ref 26–34)
MCHC RBC AUTO-ENTMCNC: 32.7 G/DL (ref 31–36)
MCV RBC AUTO: 98.1 FL (ref 80–100)
MONOCYTES # BLD: 1.1 K/UL (ref 0–1.3)
MONOCYTES NFR BLD: 9.4 %
NEUTROPHILS # BLD: 8.5 K/UL (ref 1.7–7.7)
NEUTROPHILS NFR BLD: 75.5 %
PHOSPHATE SERPL-MCNC: 3.1 MG/DL (ref 2.5–4.9)
PLATELET # BLD AUTO: 279 K/UL (ref 135–450)
PMV BLD AUTO: 8.1 FL (ref 5–10.5)
POTASSIUM SERPL-SCNC: 3.5 MMOL/L (ref 3.5–5.1)
RBC # BLD AUTO: 3.78 M/UL (ref 4.2–5.9)
SODIUM SERPL-SCNC: 138 MMOL/L (ref 136–145)
WBC # BLD AUTO: 11.2 K/UL (ref 4–11)

## 2024-02-29 PROCEDURE — 97530 THERAPEUTIC ACTIVITIES: CPT

## 2024-02-29 PROCEDURE — 2580000003 HC RX 258: Performed by: INTERNAL MEDICINE

## 2024-02-29 PROCEDURE — 83735 ASSAY OF MAGNESIUM: CPT

## 2024-02-29 PROCEDURE — 85025 COMPLETE CBC W/AUTO DIFF WBC: CPT

## 2024-02-29 PROCEDURE — 1200000000 HC SEMI PRIVATE

## 2024-02-29 PROCEDURE — 97535 SELF CARE MNGMENT TRAINING: CPT

## 2024-02-29 PROCEDURE — 80069 RENAL FUNCTION PANEL: CPT

## 2024-02-29 PROCEDURE — 36415 COLL VENOUS BLD VENIPUNCTURE: CPT

## 2024-02-29 PROCEDURE — 6370000000 HC RX 637 (ALT 250 FOR IP): Performed by: INTERNAL MEDICINE

## 2024-02-29 PROCEDURE — 6370000000 HC RX 637 (ALT 250 FOR IP): Performed by: NURSE PRACTITIONER

## 2024-02-29 PROCEDURE — 6360000002 HC RX W HCPCS: Performed by: INTERNAL MEDICINE

## 2024-02-29 PROCEDURE — 97116 GAIT TRAINING THERAPY: CPT

## 2024-02-29 RX ORDER — SODIUM CHLORIDE, SODIUM LACTATE, POTASSIUM CHLORIDE, AND CALCIUM CHLORIDE .6; .31; .03; .02 G/100ML; G/100ML; G/100ML; G/100ML
500 INJECTION, SOLUTION INTRAVENOUS ONCE
Status: COMPLETED | OUTPATIENT
Start: 2024-02-29 | End: 2024-02-29

## 2024-02-29 RX ADMIN — ATENOLOL 25 MG: 25 TABLET ORAL at 22:18

## 2024-02-29 RX ADMIN — QUETIAPINE FUMARATE 25 MG: 25 TABLET ORAL at 09:05

## 2024-02-29 RX ADMIN — SODIUM CHLORIDE, POTASSIUM CHLORIDE, SODIUM LACTATE AND CALCIUM CHLORIDE 500 ML: 600; 310; 30; 20 INJECTION, SOLUTION INTRAVENOUS at 13:37

## 2024-02-29 RX ADMIN — Medication 5 MG: at 22:18

## 2024-02-29 RX ADMIN — Medication 1 CAPSULE: at 09:05

## 2024-02-29 RX ADMIN — ENOXAPARIN SODIUM 40 MG: 100 INJECTION SUBCUTANEOUS at 09:05

## 2024-02-29 RX ADMIN — AMOXICILLIN AND CLAVULANATE POTASSIUM 1 TABLET: 500; 125 TABLET, FILM COATED ORAL at 22:18

## 2024-02-29 RX ADMIN — SODIUM CHLORIDE, PRESERVATIVE FREE 10 ML: 5 INJECTION INTRAVENOUS at 23:05

## 2024-02-29 RX ADMIN — CLOPIDOGREL BISULFATE 75 MG: 75 TABLET ORAL at 09:04

## 2024-02-29 RX ADMIN — DOXYCYCLINE HYCLATE 100 MG: 100 TABLET, COATED ORAL at 09:04

## 2024-02-29 RX ADMIN — ASPIRIN 81 MG 81 MG: 81 TABLET ORAL at 09:04

## 2024-02-29 RX ADMIN — QUETIAPINE FUMARATE 25 MG: 25 TABLET ORAL at 22:18

## 2024-02-29 RX ADMIN — DOXYCYCLINE HYCLATE 100 MG: 100 TABLET, COATED ORAL at 22:18

## 2024-02-29 RX ADMIN — ACETAMINOPHEN 325MG 650 MG: 325 TABLET ORAL at 22:18

## 2024-02-29 RX ADMIN — ATORVASTATIN CALCIUM 20 MG: 10 TABLET, FILM COATED ORAL at 09:04

## 2024-02-29 RX ADMIN — AMOXICILLIN AND CLAVULANATE POTASSIUM 1 TABLET: 500; 125 TABLET, FILM COATED ORAL at 09:04

## 2024-02-29 ASSESSMENT — PAIN SCALES - GENERAL: PAINLEVEL_OUTOF10: 0

## 2024-02-29 NOTE — CARE COORDINATION
LOS 4.  Care managed by Hosp Med, Podiatry, and Pall Care has seen. Here w Syncope. Plan [facility] SNF at DC.  Some low BP today. Discussed w RN and attending. Poss DC tomorrow. CM following. Teagan Ortega RN

## 2024-02-29 NOTE — PLAN OF CARE
present) --  2/28/2024 1812 by Trisha Weeks RN  Outcome: Progressing     Problem: Chronic Conditions and Co-morbidities  Goal: Patient's chronic conditions and co-morbidity symptoms are monitored and maintained or improved  2/29/2024 0417 by Elina Russell RN  Outcome: Progressing  Flowsheets (Taken 2/28/2024 2013)  Care Plan - Patient's Chronic Conditions and Co-Morbidity Symptoms are Monitored and Maintained or Improved: Monitor and assess patient's chronic conditions and comorbid symptoms for stability, deterioration, or improvement  2/28/2024 1812 by Trisha Weeks RN  Outcome: Progressing     Problem: Safety - Medical Restraint  Goal: Remains free of injury from restraints (Restraint for Interference with Medical Device)  Description: INTERVENTIONS:  1. Determine that other, less restrictive measures have been tried or would not be effective before applying the restraint  2. Evaluate the patient's condition at the time of restraint application  3. Inform patient/family regarding the reason for restraint  4. Q2H: Monitor safety, psychosocial status, comfort, nutrition and hydration  2/29/2024 0417 by Elina Russell RN  Outcome: Progressing  Flowsheets (Taken 2/27/2024 2000)  Remains free of injury from restraints (restraint for interference with medical device): Every 2 hours: Monitor safety, psychosocial status, comfort, nutrition and hydration  2/28/2024 1812 by Trisha Weeks RN  Outcome: Progressing     Problem: Neurosensory - Adult  Goal: Achieves stable or improved neurological status  2/29/2024 0417 by Elina Russell RN  Outcome: Progressing  Flowsheets (Taken 2/28/2024 2013)  Achieves stable or improved neurological status: Assess for and report changes in neurological status  2/28/2024 1812 by Trisha Weeks RN  Outcome: Progressing  Goal: Absence of seizures  2/29/2024 0417 by Elina Russell RN  Outcome: Progressing  Flowsheets (Taken 2/26/2024 1948 by Minerva Carney RN)  Absence of  membranes remain intact  2/29/2024 0417 by Elina Russell RN  Outcome: Progressing  Flowsheets (Taken 2/26/2024 1948 by Minerva Carney, RN)  Oral Mucous Membranes Remain Intact: Assess oral mucosa and hygiene practices  2/28/2024 1812 by Trisha Weeks RN  Outcome: Progressing     Problem: Musculoskeletal - Adult  Goal: Return mobility to safest level of function  2/29/2024 0417 by Elina Russell RN  Outcome: Progressing  Flowsheets (Taken 2/28/2024 2013)  Return Mobility to Safest Level of Function: Assess patient stability and activity tolerance for standing, transferring and ambulating with or without assistive devices  2/28/2024 1812 by Trisha Weeks RN  Outcome: Progressing  Goal: Maintain proper alignment of affected body part  2/29/2024 0417 by Elina Russell RN  Outcome: Progressing  Flowsheets (Taken 2/26/2024 1948 by Minerva Carney, RN)  Maintain proper alignment of affected body part: Support and protect limb and body alignment per provider's orders  2/28/2024 1812 by Trisha Weeks RN  Outcome: Progressing  Goal: Return ADL status to a safe level of function  2/29/2024 0417 by Elina Russell RN  Outcome: Progressing  Flowsheets (Taken 2/26/2024 1948 by Minerva Carney, RN)  Return ADL Status to a Safe Level of Function: Administer medication as ordered  2/28/2024 1812 by Trisha Weeks RN  Outcome: Progressing     Problem: Genitourinary - Adult  Goal: Absence of urinary retention  2/29/2024 0417 by Elina Russell RN  Outcome: Progressing  Flowsheets (Taken 2/28/2024 2013)  Absence of urinary retention: Assess patient’s ability to void and empty bladder  2/28/2024 1812 by Trisha Weeks RN  Outcome: Progressing  Goal: Urinary catheter remains patent  2/29/2024 0417 by Elina Russell RN  Outcome: Progressing  Flowsheets (Taken 2/26/2024 1948 by Minerva Carney, RN)  Urinary catheter remains patent: Assess patency of urinary catheter  2/28/2024 1812 by Trisha Weeks RN  Outcome:

## 2024-02-29 NOTE — PROGRESS NOTES
Physical Therapy  Facility/Department: [facility] B3 - MED SURG  Daily Treatment Note  NAME: Hans Corbin  : 1929  MRN: [id number]    Date of Service: 2024    Discharge Recommendations:  Subacute/Skilled Nursing Facility   PT Equipment Recommendations  Equipment Needed: No    Patient Diagnosis(es): The primary encounter diagnosis was Syncope and collapse. Diagnoses of Elevated troponin, Multiple falls, Loss of consciousness (HCC), Acute midline low back pain without sciatica, Elevated brain natriuretic peptide (BNP) level, and Bilateral pleural effusion were also pertinent to this visit.    Assessment   Assessment: Patient seen as co-treat with OT d/t significant strength deficits.  Patient cleared by RN for therapy participation this date.  Patient agreeable to therapy. Patient completed supine<>sit with mod A , sat EOB with max A initially progressing to CGA. Pt able to tolerate STS using RW with min A x2. He ambulated 15' RW to toilet but needed stedy to return to bed.  BP at EOB after toileting was 73/54 with HR 66 so he was returned to bed where he recovered slowly. RN was notified. Deferred transfer to chair d/t safety concerns and low BP. P.T .will continue to follow throughout LOS.  Recommending DC to SNF.  Activity Tolerance: Treatment limited secondary to decreased cognition;Patient limited by fatigue;Patient limited by endurance  Equipment Needed: No     Plan    Physical Therapy Plan  General Plan: 3-5 times per week  Current Treatment Recommendations: Strengthening;ROM;Balance training;Functional mobility training;Transfer training;Endurance training;Neuromuscular re-education;Home exercise program;Gait training;Patient/Caregiver education & training;Equipment evaluation, education, & procurement;Safety education & training;Co-Treatment;Therapeutic activities     Restrictions  Restrictions/Precautions  Restrictions/Precautions: General Precautions, Modified Diet, Fall Risk  Required Braces or  to Sit: Assist X2;Maximum assistance;Additional time;Adaptive equipment (RW)  Gait Training  Gait Training: Yes  Gait  Gait Training: Yes  Overall Level of Assistance: Minimum assistance;Assist X2;Additional time;Adaptive equipment;Maximum assistance (min A Of 2 progressing to max Aof 2 with distance)  Distance (ft): 15 Feet (to toilet then stedy back to EOB due to decreasing balance)  Assistive Device: Walker, rolling;Gait belt  Interventions: Safety awareness training  Base of Support: Narrowed  Speed/Maryjane: Fluctuations  Step Length: Left shortened;Right shortened  Gait Abnormalities: Decreased step clearance;Festinating gait;Early heel rise           Safety Devices  Type of Devices: All nikhil prominences offloaded;Left in bed;Heels elevated for pressure relief;Patient at risk for falls;Call light within reach;All fall risk precautions in place;Bed alarm in place;Nurse notified;Telesitter in use       Goals  Short Term Goals  Time Frame for Short Term Goals: 3/4; 2/29 con't goals  Short Term Goal 1: pt will roll R or L with MIN A  Short Term Goal 2: pt will perform sit to supine with MOD Ax2  Short Term Goal 3: pt will perform supine to sit with MAX Ax1  Short Term Goal 4: pt will sit EOB for 5 min with CGA in midline  Patient Goals   Patient Goals : unable to state    Education  Patient Education  Education Given To: Patient  Education Provided: Role of Therapy;Plan of Care;Transfer Training;Home Exercise Program;Orientation;Precautions;Equipment;Energy Conservation  Education Provided Comments: importance of OOB mobility, exercises, benefits of sitting EOB and standing  Education Method: Verbal;Demonstration  Barriers to Learning: Cognition  Education Outcome: Unable to verbalize;Unable to demonstrate understanding;Continued education needed    AM-PAC - Mobility    AM-PAC Basic Mobility - Inpatient   How much help is needed turning from your back to your side while in a flat bed without using bedrails?: A

## 2024-02-29 NOTE — PROGRESS NOTES
Occupational Therapy  Facility/Department: [facility] B3 - MED SURG  Daily Treatment Note  NAME: Hans Corbin  : 1929  MRN: [id number]    Date of Service: 2024    Discharge Recommendations:  Subacute/Skilled Nursing Facility       Therapy discharge recommendations are subject to collaboration from the patient’s interdisciplinary healthcare team, including MD and case management recommendations.  Barriers to Home Discharge:   [] Steps to access home entry or bed/bath   [x] Unable to transfer, ambulate, or propel wheelchair household distances without assist   [x] Limited available assist at home upon discharge    [] Patient or family requests d/c to post-acute facility    [x] Poor cognition/safety awareness for d/c to home alone    [] Unable to maintain ordered weight bearing status    [] Patient with salient signs of long-standing immobility   [x] Decreased independence with ADLs   [x] Increased risk for falls   [] Other:    Patient Diagnosis(es): The primary encounter diagnosis was Syncope and collapse. Diagnoses of Elevated troponin, Multiple falls, Loss of consciousness (HCC), Acute midline low back pain without sciatica, Elevated brain natriuretic peptide (BNP) level, and Bilateral pleural effusion were also pertinent to this visit.     Assessment    Assessment: Pt tolerated session fair, initially min A of 2 for mobility toward toilet, as pt got to doorway of bathroom became max A of 2 and demos significant difficulty maintaining standing position. Pt sat on toilet forward flexed with delayed responses, able to use Catarina STEDY for transfer out of bathroom, total A for hygiene and brief management. Pt with poor ability to report symptoms of hypotension (see vitals section for BPs). Pt functioning below his baseline, continue to recommend SNF at d/c. Co-tx collaboration this date with PT staff to safely progress pt toward goals. Pt will have better occupational performance outcomes within a co-treatment  + LRAD  Short Term Goal 2: Pt will complete LB dress mod A  Short Term Goal 3: Pt will complete x2 grooming tasks in supported sitting w/ min A -- by 3/2/24  Short Term Goal 4: Pt will perform x10 reps BUE therex for inc strengthening in prep for transfers / dynamic ADLs  Short Term Goal 5: Pt will follow commands 50% of session to demo inc awareness / orientation to situation in prep for engagement in functional tasks -- GOAL MET 2/28, DISCONTINUE  Patient Goals   Patient goals : unable to state    AM-PAC - ADL  AM-PAC Daily Activity - Inpatient   How much help is needed for putting on and taking off regular lower body clothing?: Total  How much help is needed for bathing (which includes washing, rinsing, drying)?: Total  How much help is needed for toileting (which includes using toilet, bedpan, or urinal)?: Total  How much help is needed for putting on and taking off regular upper body clothing?: Total  How much help is needed for taking care of personal grooming?: A Lot  How much help for eating meals?: A Lot  AM-PAC Inpatient Daily Activity Raw Score: 8  AM-PAC Inpatient ADL T-Scale Score : 22.86  ADL Inpatient CMS 0-100% Score: 85.69  ADL Inpatient CMS G-Code Modifier : CM    Therapy Time   Individual Concurrent Group Co-treatment   Time In       1025   Time Out       1105   Minutes       40           NORBERTO Lott/YANICK

## 2024-02-29 NOTE — PROGRESS NOTES
Hospital Medicine Progress Note      Date of Admission: 2/25/2024  Hospital Day: 5    Chief Admission Complaint:  Weakness, increased confusion, fall possible syncope       Subjective:  pt hypotensive today per nursing staff down to 70s sbp while toileting    Presenting Admission History:         89 y.o. male  with PMH significant for HTN, Hyperlipidemia, CAD, dementia.  History of present illness is limited as patient is a very poor historian and his wife who is present is also a very poor historian     He was brought to the ED by EMS after his wife called them.    Per report he has been having increased weakness, increased confusion and multiple falls at home.  There was what was described a syncopal episode while he was in the toilet and trying to get up off the toilet.  He does have complaint of lower back pain.  Wife describes he is getting increased confused, increased weakness and having multiple falls at home.  She is having difficult time taking care of him and brought him to the ED for further evaluation.  Workup in the ED did include CT lumbar spine which did reveal compression fractures likely chronic.  CT scan of the head revealed no acute intracranial abnormality.  Chest x-ray also done.  There is no report of chest pain or sob.      He not safe to be discharged home.  Wife states she is not able to care for him and she has concern that  he would  fall and sustain injuries.  He is now admitted for further evaluation management        Assessment/Plan:       Current Principal Problem:  Syncope and collapse       Syncope : Uncertain if there was true syncope or he is very weak and falling.  Per wife there is more falling and weakness.   He was admitted and followed on telemetry to exclude any malignant arrhythmia.     Weakness and deconditioning: He does appear very weak and frail.  Per wife he is having increased walking at home and having falls.   -asked PT OT to evaluate   -cards consulted given  surgery/procedure with associated risk factors:    ----------------------------------------------------------------------  C. Data (any 2)  [x] Discussed current management and discharge planning options with Case Management.  [] Discussed management of the case with:    [] Telemetry personally reviewed and interpreted as documented above    [] Imaging personally reviewed and interpreted, includes:    [x] Data Review (any 3)  [] Collateral history obtained from:    [x] All available Consultant notes from yesterday/today were reviewed  [x] All current labs were reviewed and interpreted for clinical significance   [x] Appropriate follow-up labs were ordered    Medications:  Personally reviewed in detail in conjunction w/ labs as documented for evidence of drug toxicity.     Infusion Medications    sodium chloride       Scheduled Medications    doxycycline hyclate  100 mg Oral 2 times per day    amoxicillin-clavulanate  1 tablet Oral 2 times per day    lactobacillus  1 capsule Oral Daily with breakfast    OLANZapine  5 mg IntraMUSCular Once    melatonin  5 mg Oral Nightly    atenolol  25 mg Oral Nightly    clopidogrel  75 mg Oral Daily    [Held by provider] midodrine  5 mg Oral TID WC    atorvastatin  20 mg Oral Daily    sodium chloride flush  5-40 mL IntraVENous 2 times per day    enoxaparin  40 mg SubCUTAneous Daily    aspirin  81 mg Oral Daily    QUEtiapine  25 mg Oral BID     PRN Meds: sodium chloride flush, sodium chloride, ondansetron **OR** ondansetron, acetaminophen **OR** acetaminophen, polyethylene glycol, perflutren lipid microspheres     Labs:  Personally reviewed and interpreted for clinical significance.     Recent Labs     02/28/24 0710 02/29/24  0716   WBC 12.0* 11.2*   HGB 12.2* 12.1*   HCT 36.2* 37.1*    279     Recent Labs     02/28/24 0710 02/29/24  0716    138   K 3.2* 3.5    102   CO2 25 26   BUN 38* 38*   CREATININE 1.1 1.0   CALCIUM 8.5 8.5   MG 2.00 2.00   PHOS 3.2 3.1      No results for input(s): \"PROBNP\", \"TROPHS\" in the last 72 hours.  No results for input(s): \"LABA1C\" in the last 72 hours.  No results for input(s): \"AST\", \"ALT\", \"BILIDIR\", \"BILITOT\", \"ALKPHOS\" in the last 72 hours.  No results for input(s): \"INR\", \"LACTA\", \"TSH\" in the last 72 hours.    Urine Cultures: No results found for: \"LABURIN\"  Blood Cultures:   Lab Results   Component Value Date/Time    BC No growth after 5 days of incubation. 03/29/2018 07:15 AM     Lab Results   Component Value Date/Time    BLOODCULT2 See additional report for complete BCID panel. 03/29/2018 07:44 AM    BLOODCULT2  03/29/2018 07:44 AM     POSITIVE for  This organism was isolated from one bottle only.  Susceptibility testing is not routinely done as this  organism frequently represents skin contamination.  Additional testing can be ordered by calling the  Microbiology Department within 30 days.       Organism:   Lab Results   Component Value Date/Time    ORG Staphylococcus coagulase negative DNA Detected 03/29/2018 07:44 AM    ORG Staphylococcus coagulase-negative 03/29/2018 07:44 AM         Andrea Kent MD

## 2024-03-01 LAB
ALBUMIN SERPL-MCNC: 3.5 G/DL (ref 3.4–5)
ANION GAP SERPL CALCULATED.3IONS-SCNC: 13 MMOL/L (ref 3–16)
BASOPHILS # BLD: 0 K/UL (ref 0–0.2)
BASOPHILS NFR BLD: 0 %
BUN SERPL-MCNC: 38 MG/DL (ref 7–20)
CALCIUM SERPL-MCNC: 8.6 MG/DL (ref 8.3–10.6)
CHLORIDE SERPL-SCNC: 101 MMOL/L (ref 99–110)
CO2 SERPL-SCNC: 22 MMOL/L (ref 21–32)
CREAT SERPL-MCNC: 1 MG/DL (ref 0.8–1.3)
DEPRECATED RDW RBC AUTO: 13.1 % (ref 12.4–15.4)
EOSINOPHIL # BLD: 0 K/UL (ref 0–0.6)
EOSINOPHIL NFR BLD: 0 %
GFR SERPLBLD CREATININE-BSD FMLA CKD-EPI: >60 ML/MIN/{1.73_M2}
GLUCOSE SERPL-MCNC: 103 MG/DL (ref 70–99)
HCT VFR BLD AUTO: 41.2 % (ref 40.5–52.5)
HGB BLD-MCNC: 13.5 G/DL (ref 13.5–17.5)
LYMPHOCYTES # BLD: 1.5 K/UL (ref 1–5.1)
LYMPHOCYTES NFR BLD: 10 %
MAGNESIUM SERPL-MCNC: 2 MG/DL (ref 1.8–2.4)
MCH RBC QN AUTO: 31.6 PG (ref 26–34)
MCHC RBC AUTO-ENTMCNC: 32.9 G/DL (ref 31–36)
MCV RBC AUTO: 96.1 FL (ref 80–100)
MONOCYTES # BLD: 0.8 K/UL (ref 0–1.3)
MONOCYTES NFR BLD: 5 %
NEUTROPHILS # BLD: 13 K/UL (ref 1.7–7.7)
NEUTROPHILS NFR BLD: 81 %
NEUTS BAND NFR BLD MANUAL: 4 % (ref 0–7)
PHOSPHATE SERPL-MCNC: 2.8 MG/DL (ref 2.5–4.9)
PLATELET # BLD AUTO: 308 K/UL (ref 135–450)
PLATELET BLD QL SMEAR: ADEQUATE
PMV BLD AUTO: 8.7 FL (ref 5–10.5)
POTASSIUM SERPL-SCNC: 3.5 MMOL/L (ref 3.5–5.1)
RBC # BLD AUTO: 4.28 M/UL (ref 4.2–5.9)
RBC MORPH BLD: NORMAL
SLIDE REVIEW: ABNORMAL
SODIUM SERPL-SCNC: 136 MMOL/L (ref 136–145)
WBC # BLD AUTO: 15.3 K/UL (ref 4–11)

## 2024-03-01 PROCEDURE — 36415 COLL VENOUS BLD VENIPUNCTURE: CPT

## 2024-03-01 PROCEDURE — 83735 ASSAY OF MAGNESIUM: CPT

## 2024-03-01 PROCEDURE — 6370000000 HC RX 637 (ALT 250 FOR IP): Performed by: NURSE PRACTITIONER

## 2024-03-01 PROCEDURE — 2580000003 HC RX 258: Performed by: INTERNAL MEDICINE

## 2024-03-01 PROCEDURE — 1200000000 HC SEMI PRIVATE

## 2024-03-01 PROCEDURE — 6360000002 HC RX W HCPCS: Performed by: INTERNAL MEDICINE

## 2024-03-01 PROCEDURE — 85025 COMPLETE CBC W/AUTO DIFF WBC: CPT

## 2024-03-01 PROCEDURE — 80069 RENAL FUNCTION PANEL: CPT

## 2024-03-01 PROCEDURE — 6370000000 HC RX 637 (ALT 250 FOR IP): Performed by: INTERNAL MEDICINE

## 2024-03-01 RX ADMIN — ACETAMINOPHEN 325MG 650 MG: 325 TABLET ORAL at 21:38

## 2024-03-01 RX ADMIN — SODIUM CHLORIDE, PRESERVATIVE FREE 10 ML: 5 INJECTION INTRAVENOUS at 09:12

## 2024-03-01 RX ADMIN — ATORVASTATIN CALCIUM 20 MG: 10 TABLET, FILM COATED ORAL at 09:07

## 2024-03-01 RX ADMIN — CLOPIDOGREL BISULFATE 75 MG: 75 TABLET ORAL at 09:07

## 2024-03-01 RX ADMIN — DOXYCYCLINE HYCLATE 100 MG: 100 TABLET, COATED ORAL at 09:06

## 2024-03-01 RX ADMIN — SODIUM CHLORIDE, PRESERVATIVE FREE 10 ML: 5 INJECTION INTRAVENOUS at 21:44

## 2024-03-01 RX ADMIN — AMOXICILLIN AND CLAVULANATE POTASSIUM 1 TABLET: 500; 125 TABLET, FILM COATED ORAL at 09:07

## 2024-03-01 RX ADMIN — Medication 5 MG: at 21:37

## 2024-03-01 RX ADMIN — ATENOLOL 25 MG: 25 TABLET ORAL at 21:38

## 2024-03-01 RX ADMIN — DOXYCYCLINE HYCLATE 100 MG: 100 TABLET, COATED ORAL at 21:38

## 2024-03-01 RX ADMIN — ENOXAPARIN SODIUM 40 MG: 100 INJECTION SUBCUTANEOUS at 09:06

## 2024-03-01 RX ADMIN — Medication 1 CAPSULE: at 09:07

## 2024-03-01 RX ADMIN — QUETIAPINE FUMARATE 25 MG: 25 TABLET ORAL at 21:38

## 2024-03-01 RX ADMIN — QUETIAPINE FUMARATE 25 MG: 25 TABLET ORAL at 09:07

## 2024-03-01 RX ADMIN — AMOXICILLIN AND CLAVULANATE POTASSIUM 1 TABLET: 500; 125 TABLET, FILM COATED ORAL at 21:37

## 2024-03-01 RX ADMIN — ASPIRIN 81 MG 81 MG: 81 TABLET ORAL at 09:07

## 2024-03-01 NOTE — CARE COORDINATION
Chart review day 5- pt from home w/spouse. Plan for [facility] at [place]. Palliative has met with family and they want to try rehab for now. May enroll with hospice if pt does not tolerate rehab. Pt remains orthostatic. CM will continue to follow.

## 2024-03-01 NOTE — PROGRESS NOTES
Orthostatic preformed, laying 148/79 sitting on side of bed 111/61. Attempted to stand patient, patient started yelling and pushing nurse away. Standing not able to be preformed.

## 2024-03-01 NOTE — PROGRESS NOTES
Hospital Medicine Progress Note      Date of Admission: 2/25/2024  Hospital Day: 6      Chief Admission Complaint:  Weakness, increased confusion, fall possible syncope       Subjective:  pt resting in bed, much more awake/alert and interactive today, was again hypotensive today per nursing staff     Presenting Admission History:          89 y.o. male  with PMH significant for HTN, Hyperlipidemia, CAD, dementia.  History of present illness is limited as patient is a very poor historian and his wife who is present is also a very poor historian     He was brought to the ED by EMS after his wife called them.    Per report he has been having increased weakness, increased confusion and multiple falls at home.  There was what was described a syncopal episode while he was in the toilet and trying to get up off the toilet.  He does have complaint of lower back pain.  Wife describes he is getting increased confused, increased weakness and having multiple falls at home.  She is having difficult time taking care of him and brought him to the ED for further evaluation.  Workup in the ED did include CT lumbar spine which did reveal compression fractures likely chronic.  CT scan of the head revealed no acute intracranial abnormality.  Chest x-ray also done.  There is no report of chest pain or sob.      He not safe to be discharged home.  Wife states she is not able to care for him and she has concern that  he would  fall and sustain injuries.  He is now admitted for further evaluation management        Assessment/Plan:       Current Principal Problem:  Syncope and collapse       Syncope : Uncertain if there was true syncope or he is very weak and falling.  Per wife there is more falling and weakness.   He was admitted and followed on telemetry to exclude any malignant arrhythmia.     Weakness and deconditioning: He does appear very weak and frail.  Per wife he is having increased walking at home and having falls.   -asked PT  surgery/procedure with associated risk factors:    ----------------------------------------------------------------------  C. Data (any 2)  [x] Discussed current management and discharge planning options with Case Management.  [] Discussed management of the case with:    [] Telemetry personally reviewed and interpreted as documented above    [] Imaging personally reviewed and interpreted, includes:    [x] Data Review (any 3)  [] Collateral history obtained from:    [x] All available Consultant notes from yesterday/today were reviewed  [x] All current labs were reviewed and interpreted for clinical significance   [x] Appropriate follow-up labs were ordered    Medications:  Personally reviewed in detail in conjunction w/ labs as documented for evidence of drug toxicity.     Infusion Medications    sodium chloride       Scheduled Medications    doxycycline hyclate  100 mg Oral 2 times per day    amoxicillin-clavulanate  1 tablet Oral 2 times per day    lactobacillus  1 capsule Oral Daily with breakfast    OLANZapine  5 mg IntraMUSCular Once    melatonin  5 mg Oral Nightly    atenolol  25 mg Oral Nightly    clopidogrel  75 mg Oral Daily    [Held by provider] midodrine  5 mg Oral TID WC    atorvastatin  20 mg Oral Daily    sodium chloride flush  5-40 mL IntraVENous 2 times per day    enoxaparin  40 mg SubCUTAneous Daily    aspirin  81 mg Oral Daily    QUEtiapine  25 mg Oral BID     PRN Meds: sodium chloride flush, sodium chloride, ondansetron **OR** ondansetron, acetaminophen **OR** acetaminophen, polyethylene glycol, perflutren lipid microspheres     Labs:  Personally reviewed and interpreted for clinical significance.     Recent Labs     02/28/24  0710 02/29/24  0716 03/01/24  0754   WBC 12.0* 11.2* 15.3*   HGB 12.2* 12.1* 13.5   HCT 36.2* 37.1* 41.2    279 308     Recent Labs     02/28/24  0710 02/29/24  0716 03/01/24  0754    138 136   K 3.2* 3.5 3.5    102 101   CO2 25 26 22   BUN 38* 38* 38*    CREATININE 1.1 1.0 1.0   CALCIUM 8.5 8.5 8.6   MG 2.00 2.00 2.00   PHOS 3.2 3.1 2.8     No results for input(s): \"PROBNP\", \"TROPHS\" in the last 72 hours.  No results for input(s): \"LABA1C\" in the last 72 hours.  No results for input(s): \"AST\", \"ALT\", \"BILIDIR\", \"BILITOT\", \"ALKPHOS\" in the last 72 hours.  No results for input(s): \"INR\", \"LACTA\", \"TSH\" in the last 72 hours.    Urine Cultures: No results found for: \"LABURIN\"  Blood Cultures:   Lab Results   Component Value Date/Time    BC No growth after 5 days of incubation. 03/29/2018 07:15 AM     Lab Results   Component Value Date/Time    BLOODCULT2 See additional report for complete BCID panel. 03/29/2018 07:44 AM    BLOODCULT2  03/29/2018 07:44 AM     POSITIVE for  This organism was isolated from one bottle only.  Susceptibility testing is not routinely done as this  organism frequently represents skin contamination.  Additional testing can be ordered by calling the  Microbiology Department within 30 days.       Organism:   Lab Results   Component Value Date/Time    ORG Staphylococcus coagulase negative DNA Detected 03/29/2018 07:44 AM    ORG Staphylococcus coagulase-negative 03/29/2018 07:44 AM         Andrea Kent MD

## 2024-03-01 NOTE — PLAN OF CARE
Problem: Discharge Planning  Goal: Discharge to home or other facility with appropriate resources  Outcome: Progressing     Problem: Pain  Goal: Verbalizes/displays adequate comfort level or baseline comfort level  Outcome: Progressing     Problem: ABCDS Injury Assessment  Goal: Absence of physical injury  Outcome: Progressing     Problem: Skin/Tissue Integrity  Goal: Absence of new skin breakdown  Description: 1.  Monitor for areas of redness and/or skin breakdown  2.  Assess vascular access sites hourly  3.  Every 4-6 hours minimum:  Change oxygen saturation probe site  4.  Every 4-6 hours:  If on nasal continuous positive airway pressure, respiratory therapy assess nares and determine need for appliance change or resting period.  Outcome: Progressing     Problem: Safety - Adult  Goal: Free from fall injury  Outcome: Progressing     Problem: Chronic Conditions and Co-morbidities  Goal: Patient's chronic conditions and co-morbidity symptoms are monitored and maintained or improved  Outcome: Progressing     Problem: Safety - Medical Restraint  Goal: Remains free of injury from restraints (Restraint for Interference with Medical Device)  Description: INTERVENTIONS:  1. Determine that other, less restrictive measures have been tried or would not be effective before applying the restraint  2. Evaluate the patient's condition at the time of restraint application  3. Inform patient/family regarding the reason for restraint  4. Q2H: Monitor safety, psychosocial status, comfort, nutrition and hydration  Outcome: Progressing     Problem: Neurosensory - Adult  Goal: Achieves stable or improved neurological status  Outcome: Progressing  Flowsheets (Taken 3/1/2024 0948)  Achieves stable or improved neurological status: Assess for and report changes in neurological status     Problem: Neurosensory - Adult  Goal: Absence of seizures  Outcome: Progressing  Flowsheets (Taken 3/1/2024 0948)  Absence of seizures: Monitor for

## 2024-03-02 ENCOUNTER — APPOINTMENT (OUTPATIENT)
Dept: GENERAL RADIOLOGY | Age: 89
DRG: 884 | End: 2024-03-02
Payer: MEDICARE

## 2024-03-02 LAB
ALBUMIN SERPL-MCNC: 3 G/DL (ref 3.4–5)
ANION GAP SERPL CALCULATED.3IONS-SCNC: 12 MMOL/L (ref 3–16)
BACTERIA URNS QL MICRO: ABNORMAL /HPF
BASOPHILS # BLD: 0.1 K/UL (ref 0–0.2)
BASOPHILS NFR BLD: 0.5 %
BILIRUB UR QL STRIP.AUTO: NEGATIVE
BUN SERPL-MCNC: 36 MG/DL (ref 7–20)
CALCIUM SERPL-MCNC: 8.5 MG/DL (ref 8.3–10.6)
CHLORIDE SERPL-SCNC: 105 MMOL/L (ref 99–110)
CLARITY UR: ABNORMAL
CO2 SERPL-SCNC: 24 MMOL/L (ref 21–32)
COLOR UR: ABNORMAL
CREAT SERPL-MCNC: 1 MG/DL (ref 0.8–1.3)
DEPRECATED RDW RBC AUTO: 12.8 % (ref 12.4–15.4)
EOSINOPHIL # BLD: 0.3 K/UL (ref 0–0.6)
EOSINOPHIL NFR BLD: 2.2 %
EPI CELLS #/AREA URNS HPF: ABNORMAL /HPF (ref 0–5)
GFR SERPLBLD CREATININE-BSD FMLA CKD-EPI: >60 ML/MIN/{1.73_M2}
GLUCOSE SERPL-MCNC: 94 MG/DL (ref 70–99)
GLUCOSE UR STRIP.AUTO-MCNC: NEGATIVE MG/DL
HCT VFR BLD AUTO: 38.1 % (ref 40.5–52.5)
HGB BLD-MCNC: 12.5 G/DL (ref 13.5–17.5)
HGB UR QL STRIP.AUTO: ABNORMAL
KETONES UR STRIP.AUTO-MCNC: ABNORMAL MG/DL
LEUKOCYTE ESTERASE UR QL STRIP.AUTO: ABNORMAL
LYMPHOCYTES # BLD: 1.3 K/UL (ref 1–5.1)
LYMPHOCYTES NFR BLD: 9.9 %
MCH RBC QN AUTO: 31.6 PG (ref 26–34)
MCHC RBC AUTO-ENTMCNC: 32.8 G/DL (ref 31–36)
MCV RBC AUTO: 96.4 FL (ref 80–100)
MONOCYTES # BLD: 1 K/UL (ref 0–1.3)
MONOCYTES NFR BLD: 8.1 %
NEUTROPHILS # BLD: 10 K/UL (ref 1.7–7.7)
NEUTROPHILS NFR BLD: 79.3 %
NITRITE UR QL STRIP.AUTO: NEGATIVE
PH UR STRIP.AUTO: 6 [PH] (ref 5–8)
PHOSPHATE SERPL-MCNC: 3.2 MG/DL (ref 2.5–4.9)
PLATELET # BLD AUTO: 320 K/UL (ref 135–450)
PMV BLD AUTO: 8.7 FL (ref 5–10.5)
POTASSIUM SERPL-SCNC: 3.5 MMOL/L (ref 3.5–5.1)
PROCALCITONIN SERPL IA-MCNC: 0.09 NG/ML (ref 0–0.15)
PROT UR STRIP.AUTO-MCNC: 30 MG/DL
RBC # BLD AUTO: 3.95 M/UL (ref 4.2–5.9)
RBC #/AREA URNS HPF: ABNORMAL /HPF (ref 0–4)
SODIUM SERPL-SCNC: 141 MMOL/L (ref 136–145)
SP GR UR STRIP.AUTO: 1.02 (ref 1–1.03)
UA DIPSTICK W REFLEX MICRO PNL UR: YES
URN SPEC COLLECT METH UR: ABNORMAL
UROBILINOGEN UR STRIP-ACNC: 1 E.U./DL
WBC # BLD AUTO: 12.7 K/UL (ref 4–11)
WBC #/AREA URNS HPF: ABNORMAL /HPF (ref 0–5)

## 2024-03-02 PROCEDURE — 6360000002 HC RX W HCPCS: Performed by: INTERNAL MEDICINE

## 2024-03-02 PROCEDURE — 80069 RENAL FUNCTION PANEL: CPT

## 2024-03-02 PROCEDURE — 6370000000 HC RX 637 (ALT 250 FOR IP): Performed by: INTERNAL MEDICINE

## 2024-03-02 PROCEDURE — 36415 COLL VENOUS BLD VENIPUNCTURE: CPT

## 2024-03-02 PROCEDURE — 2580000003 HC RX 258: Performed by: INTERNAL MEDICINE

## 2024-03-02 PROCEDURE — 84145 PROCALCITONIN (PCT): CPT

## 2024-03-02 PROCEDURE — 1200000000 HC SEMI PRIVATE

## 2024-03-02 PROCEDURE — 85025 COMPLETE CBC W/AUTO DIFF WBC: CPT

## 2024-03-02 PROCEDURE — 81001 URINALYSIS AUTO W/SCOPE: CPT

## 2024-03-02 PROCEDURE — 87086 URINE CULTURE/COLONY COUNT: CPT

## 2024-03-02 PROCEDURE — 6370000000 HC RX 637 (ALT 250 FOR IP): Performed by: NURSE PRACTITIONER

## 2024-03-02 PROCEDURE — 71045 X-RAY EXAM CHEST 1 VIEW: CPT

## 2024-03-02 RX ORDER — CIPROFLOXACIN 2 MG/ML
400 INJECTION, SOLUTION INTRAVENOUS EVERY 12 HOURS
Status: DISCONTINUED | OUTPATIENT
Start: 2024-03-02 | End: 2024-03-04

## 2024-03-02 RX ADMIN — ATORVASTATIN CALCIUM 20 MG: 10 TABLET, FILM COATED ORAL at 08:49

## 2024-03-02 RX ADMIN — AMOXICILLIN AND CLAVULANATE POTASSIUM 1 TABLET: 500; 125 TABLET, FILM COATED ORAL at 08:49

## 2024-03-02 RX ADMIN — ASPIRIN 81 MG 81 MG: 81 TABLET ORAL at 08:49

## 2024-03-02 RX ADMIN — AMOXICILLIN AND CLAVULANATE POTASSIUM 1 TABLET: 500; 125 TABLET, FILM COATED ORAL at 21:26

## 2024-03-02 RX ADMIN — Medication 5 MG: at 21:26

## 2024-03-02 RX ADMIN — SODIUM CHLORIDE, PRESERVATIVE FREE 10 ML: 5 INJECTION INTRAVENOUS at 08:50

## 2024-03-02 RX ADMIN — CLOPIDOGREL BISULFATE 75 MG: 75 TABLET ORAL at 08:49

## 2024-03-02 RX ADMIN — DOXYCYCLINE HYCLATE 100 MG: 100 TABLET, COATED ORAL at 08:49

## 2024-03-02 RX ADMIN — QUETIAPINE FUMARATE 25 MG: 25 TABLET ORAL at 21:26

## 2024-03-02 RX ADMIN — ENOXAPARIN SODIUM 40 MG: 100 INJECTION SUBCUTANEOUS at 08:49

## 2024-03-02 RX ADMIN — QUETIAPINE FUMARATE 25 MG: 25 TABLET ORAL at 08:49

## 2024-03-02 RX ADMIN — Medication 1 CAPSULE: at 08:49

## 2024-03-02 RX ADMIN — SODIUM CHLORIDE, PRESERVATIVE FREE 10 ML: 5 INJECTION INTRAVENOUS at 21:25

## 2024-03-02 RX ADMIN — DOXYCYCLINE HYCLATE 100 MG: 100 TABLET, COATED ORAL at 21:26

## 2024-03-02 RX ADMIN — ATENOLOL 25 MG: 25 TABLET ORAL at 21:26

## 2024-03-02 RX ADMIN — CIPROFLOXACIN 400 MG: 400 INJECTION, SOLUTION INTRAVENOUS at 15:22

## 2024-03-02 NOTE — PROGRESS NOTES
Hospital Medicine Progress Note      Date of Admission: 2/25/2024  Hospital Day: 7    Chief Admission Complaint:  Weakness, increased confusion, fall possible syncope        Subjective:  no complaints, resting in bed, still with leukocytosis(ua/cxr ordered)    Presenting Admission History:         89 y.o. male  with PMH significant for HTN, Hyperlipidemia, CAD, dementia.  History of present illness is limited as patient is a very poor historian and his wife who is present is also a very poor historian     He was brought to the ED by EMS after his wife called them.    Per report he has been having increased weakness, increased confusion and multiple falls at home.  There was what was described a syncopal episode while he was in the toilet and trying to get up off the toilet.  He does have complaint of lower back pain.  Wife describes he is getting increased confused, increased weakness and having multiple falls at home.  She is having difficult time taking care of him and brought him to the ED for further evaluation.  Workup in the ED did include CT lumbar spine which did reveal compression fractures likely chronic.  CT scan of the head revealed no acute intracranial abnormality.  Chest x-ray also done.  There is no report of chest pain or sob.      He not safe to be discharged home.  Wife states she is not able to care for him and she has concern that  he would  fall and sustain injuries.  He is now admitted for further evaluation management        Assessment/Plan:       Current Principal Problem:  Syncope and collapse       Syncope : Uncertain if there was true syncope or he is very weak and falling.  Per wife there is more falling and weakness.   He was admitted and followed on telemetry to exclude any malignant arrhythmia.     Weakness and deconditioning: He does appear very weak and frail.  Per wife he is having increased walking at home and having falls.   -asked PT OT to evaluate   -cards consulted given  03/02/24  0734    136 141   K 3.5 3.5 3.5    101 105   CO2 26 22 24   BUN 38* 38* 36*   CREATININE 1.0 1.0 1.0   CALCIUM 8.5 8.6 8.5   MG 2.00 2.00  --    PHOS 3.1 2.8 3.2     No results for input(s): \"PROBNP\", \"TROPHS\" in the last 72 hours.  No results for input(s): \"LABA1C\" in the last 72 hours.  No results for input(s): \"AST\", \"ALT\", \"BILIDIR\", \"BILITOT\", \"ALKPHOS\" in the last 72 hours.  No results for input(s): \"INR\", \"LACTA\", \"TSH\" in the last 72 hours.    Urine Cultures: No results found for: \"LABURIN\"  Blood Cultures:   Lab Results   Component Value Date/Time    BC No growth after 5 days of incubation. 03/29/2018 07:15 AM     Lab Results   Component Value Date/Time    BLOODCULT2 See additional report for complete BCID panel. 03/29/2018 07:44 AM    BLOODCULT2  03/29/2018 07:44 AM     POSITIVE for  This organism was isolated from one bottle only.  Susceptibility testing is not routinely done as this  organism frequently represents skin contamination.  Additional testing can be ordered by calling the  Microbiology Department within 30 days.       Organism:   Lab Results   Component Value Date/Time    ORG Staphylococcus coagulase negative DNA Detected 03/29/2018 07:44 AM    ORG Staphylococcus coagulase-negative 03/29/2018 07:44 AM         nAdrea Kent MD

## 2024-03-02 NOTE — PLAN OF CARE
Problem: Discharge Planning  Goal: Discharge to home or other facility with appropriate resources  3/2/2024 0043 by Glory Steel LPN  Outcome: Progressing  3/1/2024 1059 by Jade Bishop RN  Outcome: Progressing     Problem: Pain  Goal: Verbalizes/displays adequate comfort level or baseline comfort level  3/2/2024 0043 by Glory Steel LPN  Outcome: Progressing  3/1/2024 1059 by Jade Bishop RN  Outcome: Progressing     Problem: ABCDS Injury Assessment  Goal: Absence of physical injury  3/2/2024 0043 by Glory Steel LPN  Outcome: Progressing  3/1/2024 1059 by Jade Bishop RN  Outcome: Progressing     Problem: Skin/Tissue Integrity  Goal: Absence of new skin breakdown  Description: 1.  Monitor for areas of redness and/or skin breakdown  2.  Assess vascular access sites hourly  3.  Every 4-6 hours minimum:  Change oxygen saturation probe site  4.  Every 4-6 hours:  If on nasal continuous positive airway pressure, respiratory therapy assess nares and determine need for appliance change or resting period.  3/2/2024 0043 by Glory Steel LPN  Outcome: Progressing  3/1/2024 1059 by Jade Bishop RN  Outcome: Progressing     Problem: Safety - Adult  Goal: Free from fall injury  3/2/2024 0043 by Golry Steel LPN  Outcome: Progressing  3/1/2024 1059 by Jade Bishop RN  Outcome: Progressing     Problem: Chronic Conditions and Co-morbidities  Goal: Patient's chronic conditions and co-morbidity symptoms are monitored and maintained or improved  3/2/2024 0043 by Glory Steel LPN  Outcome: Progressing  3/1/2024 1059 by Jade Bishop RN  Outcome: Progressing     Problem: Safety - Medical Restraint  Goal: Remains free of injury from restraints (Restraint for Interference with Medical Device)  Description: INTERVENTIONS:  1. Determine that other, less restrictive measures have been tried or would not be effective before applying the restraint  2. Evaluate the patient's condition at the time of  metabolic abnormalities, dehydration, psychiatric diagnoses, and notify attending LIP  2. [place] high risk fall precautions, as indicated  3. Provide frequent short contacts to provide reality reorientation, refocusing and direction  4. Decrease environmental stimuli, including noise as appropriate  5. Monitor and intervene to maintain adequate nutrition, hydration, elimination, sleep and activity  6. If unable to ensure safety without constant attention obtain sitter and review sitter guidelines with assigned personnel  7. Initiate Psychosocial CNS and Spiritual Care consult, as indicated  3/2/2024 0043 by Glory Steel LPN  Outcome: Progressing  3/1/2024 1059 by Jade Bishop, RN  Outcome: Progressing  Flowsheets (Taken 3/1/2024 1487)  Effect of thought disturbance (confusion, delirium, dementia, or psychosis) are managed with adequate functional status: Assess for contributors to thought disturbance, including medications, impaired vision or hearing, underlying metabolic abnormalities, dehydration, psychiatric diagnoses, notify LIP

## 2024-03-03 LAB
BACTERIA UR CULT: NORMAL
GLUCOSE BLD-MCNC: 98 MG/DL (ref 70–99)
PERFORMED ON: NORMAL

## 2024-03-03 PROCEDURE — 6370000000 HC RX 637 (ALT 250 FOR IP): Performed by: INTERNAL MEDICINE

## 2024-03-03 PROCEDURE — 6370000000 HC RX 637 (ALT 250 FOR IP): Performed by: NURSE PRACTITIONER

## 2024-03-03 PROCEDURE — 51798 US URINE CAPACITY MEASURE: CPT

## 2024-03-03 PROCEDURE — 1200000000 HC SEMI PRIVATE

## 2024-03-03 PROCEDURE — 6360000002 HC RX W HCPCS: Performed by: INTERNAL MEDICINE

## 2024-03-03 PROCEDURE — 51701 INSERT BLADDER CATHETER: CPT

## 2024-03-03 PROCEDURE — 2580000003 HC RX 258: Performed by: INTERNAL MEDICINE

## 2024-03-03 RX ORDER — OLANZAPINE 10 MG/2ML
2.5 INJECTION, POWDER, FOR SOLUTION INTRAMUSCULAR ONCE
Status: DISCONTINUED | OUTPATIENT
Start: 2024-03-03 | End: 2024-03-06 | Stop reason: HOSPADM

## 2024-03-03 RX ORDER — WATER 10 ML/10ML
INJECTION INTRAMUSCULAR; INTRAVENOUS; SUBCUTANEOUS
Status: DISPENSED
Start: 2024-03-03 | End: 2024-03-03

## 2024-03-03 RX ADMIN — AMOXICILLIN AND CLAVULANATE POTASSIUM 1 TABLET: 500; 125 TABLET, FILM COATED ORAL at 20:40

## 2024-03-03 RX ADMIN — ATORVASTATIN CALCIUM 20 MG: 10 TABLET, FILM COATED ORAL at 09:33

## 2024-03-03 RX ADMIN — ASPIRIN 81 MG 81 MG: 81 TABLET ORAL at 09:33

## 2024-03-03 RX ADMIN — ENOXAPARIN SODIUM 40 MG: 100 INJECTION SUBCUTANEOUS at 09:41

## 2024-03-03 RX ADMIN — SODIUM CHLORIDE, PRESERVATIVE FREE 10 ML: 5 INJECTION INTRAVENOUS at 20:46

## 2024-03-03 RX ADMIN — QUETIAPINE FUMARATE 25 MG: 25 TABLET ORAL at 09:33

## 2024-03-03 RX ADMIN — CIPROFLOXACIN 400 MG: 400 INJECTION, SOLUTION INTRAVENOUS at 14:25

## 2024-03-03 RX ADMIN — AMOXICILLIN AND CLAVULANATE POTASSIUM 1 TABLET: 500; 125 TABLET, FILM COATED ORAL at 09:33

## 2024-03-03 RX ADMIN — DOXYCYCLINE HYCLATE 100 MG: 100 TABLET, COATED ORAL at 09:33

## 2024-03-03 RX ADMIN — SODIUM CHLORIDE, PRESERVATIVE FREE 10 ML: 5 INJECTION INTRAVENOUS at 09:41

## 2024-03-03 RX ADMIN — Medication 1 CAPSULE: at 09:33

## 2024-03-03 RX ADMIN — CIPROFLOXACIN 400 MG: 400 INJECTION, SOLUTION INTRAVENOUS at 02:12

## 2024-03-03 RX ADMIN — Medication 5 MG: at 20:40

## 2024-03-03 RX ADMIN — DOXYCYCLINE HYCLATE 100 MG: 100 TABLET, COATED ORAL at 20:40

## 2024-03-03 RX ADMIN — SODIUM CHLORIDE: 9 INJECTION, SOLUTION INTRAVENOUS at 02:11

## 2024-03-03 RX ADMIN — CLOPIDOGREL BISULFATE 75 MG: 75 TABLET ORAL at 09:34

## 2024-03-03 RX ADMIN — QUETIAPINE FUMARATE 25 MG: 25 TABLET ORAL at 20:40

## 2024-03-03 ASSESSMENT — PAIN SCALES - GENERAL: PAINLEVEL_OUTOF10: 0

## 2024-03-03 NOTE — PROGRESS NOTES
Pt very restless and agitated this shift. Bladder scanned at 0200 to find pt retaining 480mL urine. Pt attempted to urinate into urinal but unable to. This nurse and another attempted to straight cath pt, was unsuccessful. Pt attempted to urinate into urinal again, was still unable to. Notified provider and order for medication for restlessness was put in. Pt calmed down, fell asleep, and didn't need the medication.

## 2024-03-03 NOTE — PLAN OF CARE
Problem: Discharge Planning  Goal: Discharge to home or other facility with appropriate resources  Outcome: Progressing     Problem: Pain  Goal: Verbalizes/displays adequate comfort level or baseline comfort level  Outcome: Progressing     Problem: ABCDS Injury Assessment  Goal: Absence of physical injury  Outcome: Progressing     Problem: Skin/Tissue Integrity  Goal: Absence of new skin breakdown  Description: 1.  Monitor for areas of redness and/or skin breakdown  2.  Assess vascular access sites hourly  3.  Every 4-6 hours minimum:  Change oxygen saturation probe site  4.  Every 4-6 hours:  If on nasal continuous positive airway pressure, respiratory therapy assess nares and determine need for appliance change or resting period.  Outcome: Progressing     Problem: Safety - Adult  Goal: Free from fall injury  Outcome: Progressing     Problem: Chronic Conditions and Co-morbidities  Goal: Patient's chronic conditions and co-morbidity symptoms are monitored and maintained or improved  Outcome: Progressing     Problem: Safety - Medical Restraint  Goal: Remains free of injury from restraints (Restraint for Interference with Medical Device)  Description: INTERVENTIONS:  1. Determine that other, less restrictive measures have been tried or would not be effective before applying the restraint  2. Evaluate the patient's condition at the time of restraint application  3. Inform patient/family regarding the reason for restraint  4. Q2H: Monitor safety, psychosocial status, comfort, nutrition and hydration  Outcome: Progressing     Problem: Neurosensory - Adult  Goal: Achieves stable or improved neurological status  Outcome: Progressing  Goal: Absence of seizures  Outcome: Progressing  Goal: Remains free of injury related to seizures activity  Outcome: Progressing  Goal: Achieves maximal functionality and self care  Outcome: Progressing     Problem: Respiratory - Adult  Goal: Achieves optimal ventilation and  oxygenation  Outcome: Progressing     Problem: Skin/Tissue Integrity - Adult  Goal: Skin integrity remains intact  Outcome: Progressing  Goal: Incisions, wounds, or drain sites healing without S/S of infection  Outcome: Progressing  Goal: Oral mucous membranes remain intact  Outcome: Progressing     Problem: Musculoskeletal - Adult  Goal: Return mobility to safest level of function  Outcome: Progressing  Goal: Maintain proper alignment of affected body part  Outcome: Progressing  Goal: Return ADL status to a safe level of function  Outcome: Progressing     Problem: Genitourinary - Adult  Goal: Absence of urinary retention  Outcome: Progressing  Goal: Urinary catheter remains patent  Outcome: Progressing     Problem: Confusion  Goal: Confusion, delirium, dementia, or psychosis is improved or at baseline  Description: INTERVENTIONS:  1. Assess for possible contributors to thought disturbance, including medications, impaired vision or hearing, underlying metabolic abnormalities, dehydration, psychiatric diagnoses, and notify attending LIP  2. [place] high risk fall precautions, as indicated  3. Provide frequent short contacts to provide reality reorientation, refocusing and direction  4. Decrease environmental stimuli, including noise as appropriate  5. Monitor and intervene to maintain adequate nutrition, hydration, elimination, sleep and activity  6. If unable to ensure safety without constant attention obtain sitter and review sitter guidelines with assigned personnel  7. Initiate Psychosocial CNS and Spiritual Care consult, as indicated  Outcome: Progressing

## 2024-03-03 NOTE — PROGRESS NOTES
Pt had brown, coffee ground-hilary, mucusy emesis on self and bed/linens when this nurse walked into room to check on him at 0045 this morning. Pt was confused and did not realize what had happened. Pt was cleaned up, including oral care, and tucked back into bed with no complaints of nausea. Provider messaged with no new orders.

## 2024-03-03 NOTE — PROGRESS NOTES
Physician Progress Note      PATIENT:               CHACORTA STOCKTON  [facility] #:                  [id number]  :                       [id number]  ADMIT DATE:       2024 1:54 AM  DISCH DATE:  RESPONDING  PROVIDER #:        Andrea Kent MD          QUERY TEXT:    Pt admitted with possible syncope and noted weakness and deconditioning.  If   possible, please document in the progress notes and discharge summary if you   are evaluating and / or treating any of the following:    The medical record reflects the following:  Risk Factors: 89y/o male, dementia  Clinical Indicators: \"Decreased functional mobility ;Decreased strength;   Decreased safe awareness; Decreased cognition; Decreased posture; Increased   pain; Decreased balance\" noted per PT  Treatment: recommend SNF at discharge, PT/OT, supportive care    Thank you,  Samira Bustamante RN, CDS  [email or web address]  Options provided:  -- Age Related Physical Debility  -- Weakness and deconditioning due to other, Please document other cause.  -- Other - I will add my own diagnosis  -- Disagree - Not applicable / Not valid  -- Disagree - Clinically unable to determine / Unknown  -- Refer to Clinical Documentation Reviewer    PROVIDER RESPONSE TEXT:    This patient has age related physical debility.    Query created by: Samira Bustamante on 2024 11:43 AM      Electronically signed by:  Andrea Kent MD 3/3/2024 6:09 PM

## 2024-03-03 NOTE — PROGRESS NOTES
Hospital Medicine Progress Note      Date of Admission: 2/25/2024  Hospital Day: 8      Chief Admission Complaint:  Weakness, increased confusion, fall possible syncope        Subjective:  no complaints, resting in bed, awake and alert and confused,      Presenting Admission History:          89 y.o. male  with PMH significant for HTN, Hyperlipidemia, CAD, dementia.  History of present illness is limited as patient is a very poor historian and his wife who is present is also a very poor historian     He was brought to the ED by EMS after his wife called them.    Per report he has been having increased weakness, increased confusion and multiple falls at home.  There was what was described a syncopal episode while he was in the toilet and trying to get up off the toilet.  He does have complaint of lower back pain.  Wife describes he is getting increased confused, increased weakness and having multiple falls at home.  She is having difficult time taking care of him and brought him to the ED for further evaluation.  Workup in the ED did include CT lumbar spine which did reveal compression fractures likely chronic.  CT scan of the head revealed no acute intracranial abnormality.  Chest x-ray also done.  There is no report of chest pain or sob.      He not safe to be discharged home.  Wife states she is not able to care for him and she has concern that  he would  fall and sustain injuries.  He is now admitted for further evaluation management        Assessment/Plan:       Current Principal Problem:  Syncope and collapse       Syncope : Uncertain if there was true syncope or he is very weak and falling.  Per wife there is more falling and weakness.   He was admitted and followed on telemetry to exclude any malignant arrhythmia.     Weakness and deconditioning: He does appear very weak and frail.  Per wife he is having increased walking at home and having falls.   -asked PT OT to evaluate   -cards consulted given elevated      Recent Labs     03/01/24  0754 03/02/24  0734    141   K 3.5 3.5    105   CO2 22 24   BUN 38* 36*   CREATININE 1.0 1.0   CALCIUM 8.6 8.5   MG 2.00  --    PHOS 2.8 3.2     No results for input(s): \"PROBNP\", \"TROPHS\" in the last 72 hours.  No results for input(s): \"LABA1C\" in the last 72 hours.  No results for input(s): \"AST\", \"ALT\", \"BILIDIR\", \"BILITOT\", \"ALKPHOS\" in the last 72 hours.  No results for input(s): \"INR\", \"LACTA\", \"TSH\" in the last 72 hours.    Urine Cultures:   Lab Results   Component Value Date/Time    LABURIN No growth at 18 to 36 hours 03/02/2024 01:00 PM     Blood Cultures:   Lab Results   Component Value Date/Time    BC No growth after 5 days of incubation. 03/29/2018 07:15 AM     Lab Results   Component Value Date/Time    BLOODCULT2 See additional report for complete BCID panel. 03/29/2018 07:44 AM    BLOODCULT2  03/29/2018 07:44 AM     POSITIVE for  This organism was isolated from one bottle only.  Susceptibility testing is not routinely done as this  organism frequently represents skin contamination.  Additional testing can be ordered by calling the  Microbiology Department within 30 days.       Organism:   Lab Results   Component Value Date/Time    ORG Staphylococcus coagulase negative DNA Detected 03/29/2018 07:44 AM    ORG Staphylococcus coagulase-negative 03/29/2018 07:44 AM         Andrea Kent MD

## 2024-03-04 LAB
ALBUMIN SERPL-MCNC: 3.1 G/DL (ref 3.4–5)
ANION GAP SERPL CALCULATED.3IONS-SCNC: 13 MMOL/L (ref 3–16)
BASOPHILS # BLD: 0.1 K/UL (ref 0–0.2)
BASOPHILS NFR BLD: 0.3 %
BUN SERPL-MCNC: 75 MG/DL (ref 7–20)
CALCIUM SERPL-MCNC: 8.3 MG/DL (ref 8.3–10.6)
CHLORIDE SERPL-SCNC: 104 MMOL/L (ref 99–110)
CO2 SERPL-SCNC: 23 MMOL/L (ref 21–32)
CREAT SERPL-MCNC: 1.4 MG/DL (ref 0.8–1.3)
DEPRECATED RDW RBC AUTO: 13 % (ref 12.4–15.4)
EOSINOPHIL # BLD: 0.4 K/UL (ref 0–0.6)
EOSINOPHIL NFR BLD: 1.7 %
GFR SERPLBLD CREATININE-BSD FMLA CKD-EPI: 47 ML/MIN/{1.73_M2}
GLUCOSE SERPL-MCNC: 98 MG/DL (ref 70–99)
HCT VFR BLD AUTO: 30.7 % (ref 40.5–52.5)
HGB BLD-MCNC: 10 G/DL (ref 13.5–17.5)
LYMPHOCYTES # BLD: 1.5 K/UL (ref 1–5.1)
LYMPHOCYTES NFR BLD: 6.1 %
MAGNESIUM SERPL-MCNC: 2 MG/DL (ref 1.8–2.4)
MCH RBC QN AUTO: 31.1 PG (ref 26–34)
MCHC RBC AUTO-ENTMCNC: 32.5 G/DL (ref 31–36)
MCV RBC AUTO: 95.6 FL (ref 80–100)
MONOCYTES # BLD: 1.5 K/UL (ref 0–1.3)
MONOCYTES NFR BLD: 6.1 %
NEUTROPHILS # BLD: 21.4 K/UL (ref 1.7–7.7)
NEUTROPHILS NFR BLD: 85.8 %
PHOSPHATE SERPL-MCNC: 3.4 MG/DL (ref 2.5–4.9)
PLATELET # BLD AUTO: 351 K/UL (ref 135–450)
PMV BLD AUTO: 8.6 FL (ref 5–10.5)
POTASSIUM SERPL-SCNC: 3.1 MMOL/L (ref 3.5–5.1)
RBC # BLD AUTO: 3.21 M/UL (ref 4.2–5.9)
SODIUM SERPL-SCNC: 140 MMOL/L (ref 136–145)
WBC # BLD AUTO: 24.9 K/UL (ref 4–11)

## 2024-03-04 PROCEDURE — 51798 US URINE CAPACITY MEASURE: CPT

## 2024-03-04 PROCEDURE — 6370000000 HC RX 637 (ALT 250 FOR IP): Performed by: INTERNAL MEDICINE

## 2024-03-04 PROCEDURE — 6360000002 HC RX W HCPCS: Performed by: NURSE PRACTITIONER

## 2024-03-04 PROCEDURE — 1200000000 HC SEMI PRIVATE

## 2024-03-04 PROCEDURE — 2580000003 HC RX 258

## 2024-03-04 PROCEDURE — 6360000002 HC RX W HCPCS: Performed by: INTERNAL MEDICINE

## 2024-03-04 PROCEDURE — 2580000003 HC RX 258: Performed by: INTERNAL MEDICINE

## 2024-03-04 PROCEDURE — 83735 ASSAY OF MAGNESIUM: CPT

## 2024-03-04 PROCEDURE — 80069 RENAL FUNCTION PANEL: CPT

## 2024-03-04 PROCEDURE — 85025 COMPLETE CBC W/AUTO DIFF WBC: CPT

## 2024-03-04 PROCEDURE — 6370000000 HC RX 637 (ALT 250 FOR IP): Performed by: NURSE PRACTITIONER

## 2024-03-04 PROCEDURE — 36415 COLL VENOUS BLD VENIPUNCTURE: CPT

## 2024-03-04 RX ORDER — SODIUM CHLORIDE 9 MG/ML
INJECTION, SOLUTION INTRAVENOUS CONTINUOUS
Status: ACTIVE | OUTPATIENT
Start: 2024-03-04 | End: 2024-03-05

## 2024-03-04 RX ORDER — OLANZAPINE 10 MG/2ML
5 INJECTION, POWDER, FOR SOLUTION INTRAMUSCULAR ONCE
Status: COMPLETED | OUTPATIENT
Start: 2024-03-04 | End: 2024-03-04

## 2024-03-04 RX ORDER — WATER 10 ML/10ML
INJECTION INTRAMUSCULAR; INTRAVENOUS; SUBCUTANEOUS
Status: COMPLETED
Start: 2024-03-04 | End: 2024-03-04

## 2024-03-04 RX ADMIN — CIPROFLOXACIN 400 MG: 400 INJECTION, SOLUTION INTRAVENOUS at 15:05

## 2024-03-04 RX ADMIN — QUETIAPINE FUMARATE 25 MG: 25 TABLET ORAL at 22:30

## 2024-03-04 RX ADMIN — SODIUM CHLORIDE: 9 INJECTION, SOLUTION INTRAVENOUS at 16:09

## 2024-03-04 RX ADMIN — QUETIAPINE FUMARATE 25 MG: 25 TABLET ORAL at 12:27

## 2024-03-04 RX ADMIN — SODIUM CHLORIDE 25 ML: 9 INJECTION, SOLUTION INTRAVENOUS at 01:36

## 2024-03-04 RX ADMIN — DOXYCYCLINE HYCLATE 100 MG: 100 TABLET, COATED ORAL at 12:27

## 2024-03-04 RX ADMIN — SODIUM CHLORIDE, PRESERVATIVE FREE 10 ML: 5 INJECTION INTRAVENOUS at 12:27

## 2024-03-04 RX ADMIN — ATORVASTATIN CALCIUM 20 MG: 10 TABLET, FILM COATED ORAL at 12:26

## 2024-03-04 RX ADMIN — Medication 1 CAPSULE: at 12:27

## 2024-03-04 RX ADMIN — ENOXAPARIN SODIUM 40 MG: 100 INJECTION SUBCUTANEOUS at 12:27

## 2024-03-04 RX ADMIN — AMOXICILLIN AND CLAVULANATE POTASSIUM 1 TABLET: 500; 125 TABLET, FILM COATED ORAL at 12:26

## 2024-03-04 RX ADMIN — Medication 5 MG: at 22:30

## 2024-03-04 RX ADMIN — WATER 10 ML: 1 INJECTION INTRAMUSCULAR; INTRAVENOUS; SUBCUTANEOUS at 00:50

## 2024-03-04 RX ADMIN — ATENOLOL 25 MG: 25 TABLET ORAL at 22:32

## 2024-03-04 RX ADMIN — ASPIRIN 81 MG 81 MG: 81 TABLET ORAL at 12:26

## 2024-03-04 RX ADMIN — CLOPIDOGREL BISULFATE 75 MG: 75 TABLET ORAL at 12:26

## 2024-03-04 RX ADMIN — ACETAMINOPHEN 325MG 650 MG: 325 TABLET ORAL at 12:26

## 2024-03-04 RX ADMIN — POTASSIUM BICARBONATE 20 MEQ: 782 TABLET, EFFERVESCENT ORAL at 16:08

## 2024-03-04 RX ADMIN — OLANZAPINE 5 MG: 10 INJECTION, POWDER, FOR SOLUTION INTRAMUSCULAR at 00:50

## 2024-03-04 RX ADMIN — CIPROFLOXACIN 400 MG: 400 INJECTION, SOLUTION INTRAVENOUS at 01:37

## 2024-03-04 ASSESSMENT — PAIN SCALES - PAIN ASSESSMENT IN ADVANCED DEMENTIA (PAINAD)
TOTALSCORE: 2
BODYLANGUAGE: 0
BREATHING: 2
CONSOLABILITY: NO NEED TO CONSOLE
NEGVOCALIZATION: OCCASIONAL MOAN/GROAN, LOW SPEECH, NEGATIVE/DISAPPROVING QUALITY
NEGVOCALIZATION: 2
NEGVOCALIZATION: REPEATED TROUBLED CALLING OUT, LOUD MOANING/GROANING, CRYING
FACIALEXPRESSION: 2
CONSOLABILITY: NO NEED TO CONSOLE
BREATHING: 0
FACIALEXPRESSION: FACIAL GRIMACING
NEGVOCALIZATION: 1
BODYLANGUAGE: TENSE, DISTRESSED PACING, FIDGETING
CONSOLABILITY: 0
TOTALSCORE: 8
BODYLANGUAGE: 1
BODYLANGUAGE: 0
CONSOLABILITY: 0
NEGVOCALIZATION: 0
BODYLANGUAGE: RELAXED
BREATHING: OCCASIONAL LABORED BREATHING, SHORT PERIOD OF HYPERVENTILATION
TOTALSCORE: 0
CONSOLABILITY: DISTRACTED OR REASSURED BY VOICE/TOUCH
FACIALEXPRESSION: 0
BREATHING: 1
BODYLANGUAGE: RELAXED
FACIALEXPRESSION: SMILING OR INEXPRESSIVE
BREATHING: NOISY LABORED BREATHING, LONG PERIODS HYPERVENTILATION, CHEYNE-STOKES RESPIRATIONS
CONSOLABILITY: 1
FACIALEXPRESSION: SMILING OR INEXPRESSIVE
FACIALEXPRESSION: 0
BREATHING: NORMAL

## 2024-03-04 NOTE — PROGRESS NOTES
Physical/Occupational Therapy    Attempted to see patient this morning. Per RN pt is unable to follow any commands and is agitated try to hit and bite staff. Pt is not appropriate for therapy at this time. Will hold until a a later time/date as pt is appropriate. Thank you.     Bebe Bustamante PT, DPT   Julia Higgins OTR/L

## 2024-03-04 NOTE — PROGRESS NOTES
Bladder scanned patient. Bladder scan showed 343 ml. Patient incontinent with history of prostate and bladder cancer. Straight cath not done at this time d/t pt history and difficulty straight on 3/3.

## 2024-03-04 NOTE — PROGRESS NOTES
"Pt's daughter Alicia called with updates for Dr. Altamirano (consent to communicate on file). She said she called last week and did not get a call back about the medication side effects (see TE 7/15/22). Writer did provide the message from Dr. Altamirano under the 7/15/22 TE but they declined a sleep study at this time until the medications are addressed.    Alicia said the pt stopped both her Paroxetine and Pramipexole medications a couple days ago due to side effects. Alicia said the Paroxetine dose was increased, but pramipexole was a new medication. Pt was having acid reflux/ \"somach issues\" and trouble sleeping. When writer discussed symptoms of stopping Paroxetine without tapering, Alicia said the symptoms described the pt \"to a T\" (sweaty, hot/cold flashes, nausea, upset stomach, headache).    Alicia said she would like to try a new medication for restless legs that would not cause as many side effects. Also, unsure if there is a different medication they can try for her depression. Dr. Altamirano and Dr. Ho are both booked this week and Alicia was hoping for recommendations today. The pt is concerned and Alicia would like to get something in place today.     Please call Alicia at [phone]      Mariia JAMES RN, BSN  [facility]      " Patient pulled out IV. Patient fighting nurse when attempting to get BP .Notified overnight APRN. APRN ordered zyprexa for patient. Patient attempted to bite charge RN while putting in IV.  Patient also threatened to hit this nurse when attempting to give oral care.

## 2024-03-04 NOTE — PROGRESS NOTES
Comprehensive Nutrition Assessment    RECOMMENDATIONS:  PO Diet: Regular, CC4 diet, encourage intake  ONS: Add Ensure BID  Education: No recommendation at this time     NUTRITION ASSESSMENT:   Nutritional summary & status: Patient admitted with syncope and collapse, this am pt was agitated while hitting and biting per EMR notes. Pt from home with wife who can no longer care for pt, both are poor historians. Pt with weakness and confusion. Regular CC4 diet in place with caffeine restriction. RD will order ONS and continue to monitor nutritional status.   Admission // PMH: NKFA \\ HTN, HLD, CAD, dementia    MALNUTRITION ASSESSMENT  Context of Malnutrition: Acute Illness   Malnutrition Status: Insufficient data    NUTRITION DIAGNOSIS   Inadequate oral intake related to cognitive or neurological impairment, acute injury/trauma as evidenced by intake 0-25%, intake 26-50%    Nutrition Monitoring and Evaluation:   Food/Nutrient Intake Outcomes:  Food and Nutrient Intake, Supplement Intake    OBJECTIVE DATA: Significant to nutrition assessment  Nutrition Related Findings: K+ 3.1, No A1C, BG WNL, No BM documented  Wounds: Surgical Incision  Nutrition Goals: Meet at least 75% of estimated needs, prior to discharge     CURRENT NUTRITION THERAPIES  ADULT DIET; Regular; 4 carb choices (60 gm/meal); No Caffeine  PO Intake: 1-25%, 51-75%   PO Supplement Intake:None Ordered    COMPARATIVE STANDARDS  Energy (kcal):  8805-5142 (25-30)     Protein (g):   (1.2-1.4)       Fluid (ml/day):  8610-7820 ml    ANTHROPOMETRICS  Current Height: 182.9 cm (6')  Current Weight - Scale: 78.1 kg (172 lb 2.9 oz)    Admission weight: 78.1 kg (172 lb 2.9 oz)    The patient will be monitored per nutrition standards of care. Consult dietitian if additional nutrition interventions are needed prior to RD reassessment.     Samira Aquino RD  Office: [phone]; Last: [id number]

## 2024-03-04 NOTE — PLAN OF CARE
Problem: Discharge Planning  Goal: Discharge to home or other facility with appropriate resources  Outcome: Progressing  Flowsheets (Taken 3/4/2024 1601)  Discharge to home or other facility with appropriate resources:   Identify barriers to discharge with patient and caregiver   Identify discharge learning needs (meds, wound care, etc)   Arrange for needed discharge resources and transportation as appropriate     Problem: Pain  Goal: Verbalizes/displays adequate comfort level or baseline comfort level  Outcome: Progressing     Problem: ABCDS Injury Assessment  Goal: Absence of physical injury  Outcome: Progressing     Problem: Skin/Tissue Integrity  Goal: Absence of new skin breakdown  Description: 1.  Monitor for areas of redness and/or skin breakdown  2.  Assess vascular access sites hourly  3.  Every 4-6 hours minimum:  Change oxygen saturation probe site  4.  Every 4-6 hours:  If on nasal continuous positive airway pressure, respiratory therapy assess nares and determine need for appliance change or resting period.  Outcome: Progressing     Problem: Safety - Adult  Goal: Free from fall injury  Outcome: Progressing     Problem: Chronic Conditions and Co-morbidities  Goal: Patient's chronic conditions and co-morbidity symptoms are monitored and maintained or improved  Outcome: Progressing  Flowsheets (Taken 3/4/2024 1601)  Care Plan - Patient's Chronic Conditions and Co-Morbidity Symptoms are Monitored and Maintained or Improved:   Monitor and assess patient's chronic conditions and comorbid symptoms for stability, deterioration, or improvement   Collaborate with multidisciplinary team to address chronic and comorbid conditions and prevent exacerbation or deterioration   Update acute care plan with appropriate goals if chronic or comorbid symptoms are exacerbated and prevent overall improvement and discharge     Problem: Safety - Medical Restraint  Goal: Remains free of injury from restraints (Restraint for  1601)  Maintain proper alignment of affected body part:   Instruct and reinforce with patient and family use of appropriate assistive device and precautions (e.g. spinal or hip dislocation precautions)   Support and protect limb and body alignment per provider's orders  Goal: Return ADL status to a safe level of function  Outcome: Progressing  Flowsheets (Taken 3/4/2024 1601)  Return ADL Status to a Safe Level of Function:   Administer medication as ordered   Assess activities of daily living deficits and provide assistive devices as needed   Obtain physical therapy/occupational therapy consults as needed     Problem: Genitourinary - Adult  Goal: Absence of urinary retention  Outcome: Progressing  Flowsheets (Taken 3/4/2024 1601)  Absence of urinary retention:   Assess patient’s ability to void and empty bladder   Place urinary catheter per Licensed Independent Practitioner order if needed   Monitor intake/output and perform bladder scan as needed  Goal: Urinary catheter remains patent  Outcome: Progressing  Flowsheets (Taken 3/4/2024 1601)  Urinary catheter remains patent:   Assess patency of urinary catheter   Irrigate catheter per Licensed Independent Practitioner order if indicated and notify Licensed Independent Practitioner if unable to irrigate   Assess need for a larger catheter size or a 3-way catheter for continuous bladder irrigation     Problem: Confusion  Goal: Confusion, delirium, dementia, or psychosis is improved or at baseline  Description: INTERVENTIONS:  1. Assess for possible contributors to thought disturbance, including medications, impaired vision or hearing, underlying metabolic abnormalities, dehydration, psychiatric diagnoses, and notify attending LIP  2. [place] high risk fall precautions, as indicated  3. Provide frequent short contacts to provide reality reorientation, refocusing and direction  4. Decrease environmental stimuli, including noise as appropriate  5. Monitor and intervene

## 2024-03-04 NOTE — PLAN OF CARE
Problem: Discharge Planning  Goal: Discharge to home or other facility with appropriate resources  Outcome: Progressing  Flowsheets (Taken 3/3/2024 2037)  Discharge to home or other facility with appropriate resources:   Identify barriers to discharge with patient and caregiver   Arrange for needed discharge resources and transportation as appropriate     Problem: Pain  Goal: Verbalizes/displays adequate comfort level or baseline comfort level  Outcome: Progressing     Problem: ABCDS Injury Assessment  Goal: Absence of physical injury  Outcome: Progressing     Problem: Skin/Tissue Integrity  Goal: Absence of new skin breakdown  Description: 1.  Monitor for areas of redness and/or skin breakdown  2.  Assess vascular access sites hourly  3.  Every 4-6 hours minimum:  Change oxygen saturation probe site  4.  Every 4-6 hours:  If on nasal continuous positive airway pressure, respiratory therapy assess nares and determine need for appliance change or resting period.  Outcome: Progressing     Problem: Safety - Adult  Goal: Free from fall injury  Outcome: Progressing     Problem: Chronic Conditions and Co-morbidities  Goal: Patient's chronic conditions and co-morbidity symptoms are monitored and maintained or improved  Outcome: Progressing  Flowsheets (Taken 3/3/2024 2037)  Care Plan - Patient's Chronic Conditions and Co-Morbidity Symptoms are Monitored and Maintained or Improved: Monitor and assess patient's chronic conditions and comorbid symptoms for stability, deterioration, or improvement     Problem: Safety - Medical Restraint  Goal: Remains free of injury from restraints (Restraint for Interference with Medical Device)  Description: INTERVENTIONS:  1. Determine that other, less restrictive measures have been tried or would not be effective before applying the restraint  2. Evaluate the patient's condition at the time of restraint application  3. Inform patient/family regarding the reason for restraint  4. Q2H:  hygiene practices     Problem: Musculoskeletal - Adult  Goal: Return mobility to safest level of function  Outcome: Progressing  Flowsheets (Taken 3/3/2024 2037)  Return Mobility to Safest Level of Function: Assess patient stability and activity tolerance for standing, transferring and ambulating with or without assistive devices  Goal: Maintain proper alignment of affected body part  Outcome: Progressing  Flowsheets (Taken 3/3/2024 2037)  Maintain proper alignment of affected body part: Support and protect limb and body alignment per provider's orders  Goal: Return ADL status to a safe level of function  Outcome: Progressing  Flowsheets (Taken 3/3/2024 2037)  Return ADL Status to a Safe Level of Function: Administer medication as ordered     Problem: Genitourinary - Adult  Goal: Absence of urinary retention  Outcome: Progressing  Flowsheets (Taken 3/3/2024 2037)  Absence of urinary retention: Assess patient’s ability to void and empty bladder  Goal: Urinary catheter remains patent  Outcome: Progressing  Flowsheets (Taken 3/3/2024 2037)  Urinary catheter remains patent: Assess patency of urinary catheter     Problem: Confusion  Goal: Confusion, delirium, dementia, or psychosis is improved or at baseline  Description: INTERVENTIONS:  1. Assess for possible contributors to thought disturbance, including medications, impaired vision or hearing, underlying metabolic abnormalities, dehydration, psychiatric diagnoses, and notify attending LIP  2. [place] high risk fall precautions, as indicated  3. Provide frequent short contacts to provide reality reorientation, refocusing and direction  4. Decrease environmental stimuli, including noise as appropriate  5. Monitor and intervene to maintain adequate nutrition, hydration, elimination, sleep and activity  6. If unable to ensure safety without constant attention obtain sitter and review sitter guidelines with assigned personnel  7. Initiate Psychosocial CNS and Spiritual  Care consult, as indicated  Outcome: Progressing  Flowsheets (Taken 3/3/2024 2037)  Effect of thought disturbance (confusion, delirium, dementia, or psychosis) are managed with adequate functional status: Assess for contributors to thought disturbance, including medications, impaired vision or hearing, underlying metabolic abnormalities, dehydration, psychiatric diagnoses, notify LIP

## 2024-03-04 NOTE — PROGRESS NOTES
be discharged due to various setbacks    3) Syncope -Cardiology consulted. Per wife, this was not syncope and was due to extreme weakness. Wife does not want extensive work up due to patient's advanced age. Echo this morning with EF 40-45% with hypokinesis of the apex, apical anterior, inferior, basal inferior and apical anterior walls as well as Grade I diastolic dysfunction.          Patient/Family Goals of Care :    3/5/24 and update    Multiple attempts over past 2 days to reach wife and son.  Finally spoke with son, updated him on hospital course since we last talked.  He says \"this is just such a mess\".  He says his mom is convinced patient is dying and is worried that he will be dead in a week.  We talked about the barriers getting him to rehab and expectations going forward.  How does he think his dad will really do in rehab?  Might we still have these same issues in a few weeks if we can even get him to rehab?  Sadi states patient does not know anything about where he is or what is going on and likely never will.  All he is going to do is keep trying to get out of bed all night and keep falling.  He does not expect patient to make any gains at this point.  He says he and his mom just want patient to be comfortable and not suffer.  They don't want him to have to come back to the hospital.      Re-addressed hospice.  Given his comments about wanting comfort and not expecting any gains from rehab, recommended we talk about comfort care again.  Talked about the hospice benefit at length.   Sadi feels like hospice is probably the best plan at this point, but feels patient should go to a nursing home for services.  He says \"mom [place] and there is no one to help her\".  Did remind him of patient's previously stated wish to die in his own home (which was also the reason he refused to move to [place] with his son).  Son admits patient did say this and that's what he would really want.  Last  orthostasis.  Patient aggressive toward staff, biting and refusing care. SLP evaluated this morning with recommendations for bite-sized solids and thin liquids                   Palliative Medicine Interventions:    patient/family support  Goals of Care discussions with patient/surrogate  Spiritual Interventions: no needs identified today         DATA:  Current labs in the epic chart reviewed as of 3/5/2024   Review of previous notes, admits, labs, radiology and testing relevant to this consult done in this chart today 3/5/2024    Data Reviewed related to this consultation:    Review of prior external note(s) from each unique source relevant to today's visit: Hospitalist, Case management, PT/OT/ST, cardiology, podiatry  Discussion of management or test with external physician/qualified health care professional: Hospitalist, Case management,    Unique test results reviewed: CBC and BMP, Liver studies, cardiac studies, CXR, echo,      Risk of Complications/Morbidity: High   Illness(es)/ Infection present that pose threat to bodily function.   There is potential for severe exacerbation of condition/side effects of treatment.  Therapy requires intensive monitoring for toxicity    Time spent on [facility] minutes + additional 30 minutes for updated discussion with wife, discussion with case management, attending and     Counseling and educating the patient/family/caregiver  Preparing to see the patient (e.g., review of tests)  Referring / communicating with other healthcare professionals including care coordination (not separately reported): Hospitalist, Case management  Documenting clinical information in the electronic health record     Signed By: Electronically signed by MICHAEL Olguin CNP on 3/5/2024 at 4:14 PM   Palliative Medicine   [phone]    2024

## 2024-03-04 NOTE — PROGRESS NOTES
Hospital Medicine Progress Note      Date of Admission: 2/25/2024  Hospital Day: 9      Chief Admission Complaint:  Weakness, increased confusion, fall possible syncope        Subjective:  no complaints, resting in bed, awake and alert and confused, wbc trending up     Presenting Admission History:          89 y.o. male  with PMH significant for HTN, Hyperlipidemia, CAD, dementia.  History of present illness is limited as patient is a very poor historian and his wife who is present is also a very poor historian     He was brought to the ED by EMS after his wife called them.    Per report he has been having increased weakness, increased confusion and multiple falls at home.  There was what was described a syncopal episode while he was in the toilet and trying to get up off the toilet.  He does have complaint of lower back pain.  Wife describes he is getting increased confused, increased weakness and having multiple falls at home.  She is having difficult time taking care of him and brought him to the ED for further evaluation.  Workup in the ED did include CT lumbar spine which did reveal compression fractures likely chronic.  CT scan of the head revealed no acute intracranial abnormality.  Chest x-ray also done.  There is no report of chest pain or sob.      He not safe to be discharged home.  Wife states she is not able to care for him and she has concern that  he would  fall and sustain injuries.  He is now admitted for further evaluation management        Assessment/Plan:       Current Principal Problem:  Syncope and collapse       Syncope : Uncertain if there was true syncope or he is very weak and falling.  Per wife there is more falling and weakness.   He was admitted and followed on telemetry to exclude any malignant arrhythmia.     Weakness and deconditioning: He does appear very weak and frail.  Per wife he is having increased walking at home and having falls.   -asked PT OT to evaluate   -cards consulted    ---------------------------------------------------------------------  B. Risk of Treatment (any 1)   [] Drugs/treatments that require intensive monitoring for toxicity include:    [] Change in code status:    [] Decision to escalate care:    [] Major surgery/procedure with associated risk factors:    ----------------------------------------------------------------------  C. Data (any 2)  [x] Discussed current management and discharge planning options with Case Management.  [] Discussed management of the case with:    [] Telemetry personally reviewed and interpreted as documented above    [] Imaging personally reviewed and interpreted, includes:    [x] Data Review (any 3)  [] Collateral history obtained from:    [x] All available Consultant notes from yesterday/today were reviewed  [x] All current labs were reviewed and interpreted for clinical significance   [x] Appropriate follow-up labs were ordered    Medications:  Personally reviewed in detail in conjunction w/ labs as documented for evidence of drug toxicity.     Infusion Medications    sodium chloride 50 mL/hr at 03/04/24 1609    sodium chloride 25 mL (03/04/24 0136)     Scheduled Medications    OLANZapine  2.5 mg IntraMUSCular Once    lactobacillus  1 capsule Oral Daily with breakfast    OLANZapine  5 mg IntraMUSCular Once    melatonin  5 mg Oral Nightly    atenolol  25 mg Oral Nightly    clopidogrel  75 mg Oral Daily    [Held by provider] midodrine  5 mg Oral TID WC    atorvastatin  20 mg Oral Daily    sodium chloride flush  5-40 mL IntraVENous 2 times per day    enoxaparin  40 mg SubCUTAneous Daily    aspirin  81 mg Oral Daily    QUEtiapine  25 mg Oral BID     PRN Meds: sodium chloride flush, sodium chloride, ondansetron **OR** ondansetron, acetaminophen **OR** acetaminophen, polyethylene glycol, perflutren lipid microspheres     Labs:  Personally reviewed and interpreted for clinical significance.     Recent Labs     03/02/24  0734 03/04/24  0571   WBC

## 2024-03-04 NOTE — CARE COORDINATION
Chart reviewed day 8. Care provided by podiatry and IM. Pt is from home with spouse. Pt has been accepted at [facility] for SNF, if pt is unable to tolerate then they will likely consider hospice. K 3.1, BUN and creat 75 and 1.4, WBC 24.9. Will continue to follow course for needs. Jyothi Granados RN

## 2024-03-05 ENCOUNTER — APPOINTMENT (OUTPATIENT)
Dept: GENERAL RADIOLOGY | Age: 89
DRG: 884 | End: 2024-03-05
Payer: MEDICARE

## 2024-03-05 VITALS
HEART RATE: 76 BPM | OXYGEN SATURATION: 68 % | DIASTOLIC BLOOD PRESSURE: 29 MMHG | WEIGHT: 172.18 LBS | BODY MASS INDEX: 23.32 KG/M2 | HEIGHT: 72 IN | RESPIRATION RATE: 24 BRPM | TEMPERATURE: 99.5 F | SYSTOLIC BLOOD PRESSURE: 66 MMHG

## 2024-03-05 LAB
ABO + RH BLD: NORMAL
ALBUMIN SERPL-MCNC: 2.9 G/DL (ref 3.4–5)
ALBUMIN SERPL-MCNC: 2.9 G/DL (ref 3.4–5)
ANION GAP SERPL CALCULATED.3IONS-SCNC: 18 MMOL/L (ref 3–16)
ANION GAP SERPL CALCULATED.3IONS-SCNC: 7 MMOL/L (ref 3–16)
BASE EXCESS BLDV CALC-SCNC: -1.3 MMOL/L (ref -3–3)
BASOPHILS # BLD: 0.1 K/UL (ref 0–0.2)
BASOPHILS # BLD: 0.1 K/UL (ref 0–0.2)
BASOPHILS NFR BLD: 0.3 %
BASOPHILS NFR BLD: 0.4 %
BLD GP AB SCN SERPL QL: NORMAL
BUN SERPL-MCNC: 70 MG/DL (ref 7–20)
BUN SERPL-MCNC: 83 MG/DL (ref 7–20)
CALCIUM SERPL-MCNC: 8.5 MG/DL (ref 8.3–10.6)
CALCIUM SERPL-MCNC: 8.5 MG/DL (ref 8.3–10.6)
CHLORIDE SERPL-SCNC: 109 MMOL/L (ref 99–110)
CHLORIDE SERPL-SCNC: 109 MMOL/L (ref 99–110)
CO2 BLDV-SCNC: 23 MMOL/L
CO2 SERPL-SCNC: 22 MMOL/L (ref 21–32)
CO2 SERPL-SCNC: 29 MMOL/L (ref 21–32)
COHGB MFR BLDV: 3.7 % (ref 0–1.5)
CREAT SERPL-MCNC: 1.3 MG/DL (ref 0.8–1.3)
CREAT SERPL-MCNC: 1.7 MG/DL (ref 0.8–1.3)
DEPRECATED RDW RBC AUTO: 13 % (ref 12.4–15.4)
DEPRECATED RDW RBC AUTO: 13 % (ref 12.4–15.4)
EOSINOPHIL # BLD: 0.1 K/UL (ref 0–0.6)
EOSINOPHIL # BLD: 0.4 K/UL (ref 0–0.6)
EOSINOPHIL NFR BLD: 0.8 %
EOSINOPHIL NFR BLD: 2.3 %
GFR SERPLBLD CREATININE-BSD FMLA CKD-EPI: 37 ML/MIN/{1.73_M2}
GFR SERPLBLD CREATININE-BSD FMLA CKD-EPI: 51 ML/MIN/{1.73_M2}
GLUCOSE BLD-MCNC: 112 MG/DL (ref 70–99)
GLUCOSE SERPL-MCNC: 148 MG/DL (ref 70–99)
GLUCOSE SERPL-MCNC: 150 MG/DL (ref 70–99)
HCO3 BLDV-SCNC: 22 MMOL/L (ref 23–29)
HCT VFR BLD AUTO: 25.7 % (ref 40.5–52.5)
HCT VFR BLD AUTO: 26.9 % (ref 40.5–52.5)
HGB BLD-MCNC: 8.4 G/DL (ref 13.5–17.5)
HGB BLD-MCNC: 8.7 G/DL (ref 13.5–17.5)
LACTATE BLDV-SCNC: 7.2 MMOL/L (ref 0.4–2)
LYMPHOCYTES # BLD: 1.3 K/UL (ref 1–5.1)
LYMPHOCYTES # BLD: 2.5 K/UL (ref 1–5.1)
LYMPHOCYTES NFR BLD: 14.1 %
LYMPHOCYTES NFR BLD: 7.3 %
MAGNESIUM SERPL-MCNC: 2.1 MG/DL (ref 1.8–2.4)
MAGNESIUM SERPL-MCNC: 2.2 MG/DL (ref 1.8–2.4)
MCH RBC QN AUTO: 31.2 PG (ref 26–34)
MCH RBC QN AUTO: 31.4 PG (ref 26–34)
MCHC RBC AUTO-ENTMCNC: 32.5 G/DL (ref 31–36)
MCHC RBC AUTO-ENTMCNC: 32.7 G/DL (ref 31–36)
MCV RBC AUTO: 95.5 FL (ref 80–100)
MCV RBC AUTO: 96.5 FL (ref 80–100)
METHGB MFR BLDV: 0.3 %
MONOCYTES # BLD: 0.8 K/UL (ref 0–1.3)
MONOCYTES # BLD: 1.1 K/UL (ref 0–1.3)
MONOCYTES NFR BLD: 4.7 %
MONOCYTES NFR BLD: 6.2 %
NEUTROPHILS # BLD: 14 K/UL (ref 1.7–7.7)
NEUTROPHILS # BLD: 15 K/UL (ref 1.7–7.7)
NEUTROPHILS NFR BLD: 80 %
NEUTROPHILS NFR BLD: 83.9 %
O2 THERAPY: ABNORMAL
PCO2 BLDV: 31.7 MMHG (ref 40–50)
PERFORMED ON: ABNORMAL
PH BLDV: 7.46 [PH] (ref 7.35–7.45)
PHOSPHATE SERPL-MCNC: 3.3 MG/DL (ref 2.5–4.9)
PHOSPHATE SERPL-MCNC: 3.8 MG/DL (ref 2.5–4.9)
PLATELET # BLD AUTO: 379 K/UL (ref 135–450)
PLATELET # BLD AUTO: 411 K/UL (ref 135–450)
PMV BLD AUTO: 8.2 FL (ref 5–10.5)
PMV BLD AUTO: 8.7 FL (ref 5–10.5)
PO2 BLDV: 35.4 MMHG (ref 25–40)
POTASSIUM SERPL-SCNC: 3.6 MMOL/L (ref 3.5–5.1)
POTASSIUM SERPL-SCNC: 4 MMOL/L (ref 3.5–5.1)
RBC # BLD AUTO: 2.69 M/UL (ref 4.2–5.9)
RBC # BLD AUTO: 2.78 M/UL (ref 4.2–5.9)
SAO2 % BLDV: 69 %
SODIUM SERPL-SCNC: 145 MMOL/L (ref 136–145)
SODIUM SERPL-SCNC: 149 MMOL/L (ref 136–145)
WBC # BLD AUTO: 17.5 K/UL (ref 4–11)
WBC # BLD AUTO: 17.8 K/UL (ref 4–11)

## 2024-03-05 PROCEDURE — 80069 RENAL FUNCTION PANEL: CPT

## 2024-03-05 PROCEDURE — C9113 INJ PANTOPRAZOLE SODIUM, VIA: HCPCS | Performed by: INTERNAL MEDICINE

## 2024-03-05 PROCEDURE — 85025 COMPLETE CBC W/AUTO DIFF WBC: CPT

## 2024-03-05 PROCEDURE — 86900 BLOOD TYPING SEROLOGIC ABO: CPT

## 2024-03-05 PROCEDURE — 82803 BLOOD GASES ANY COMBINATION: CPT

## 2024-03-05 PROCEDURE — 2580000003 HC RX 258: Performed by: INTERNAL MEDICINE

## 2024-03-05 PROCEDURE — 6360000002 HC RX W HCPCS: Performed by: INTERNAL MEDICINE

## 2024-03-05 PROCEDURE — 83605 ASSAY OF LACTIC ACID: CPT

## 2024-03-05 PROCEDURE — 86901 BLOOD TYPING SEROLOGIC RH(D): CPT

## 2024-03-05 PROCEDURE — 6370000000 HC RX 637 (ALT 250 FOR IP): Performed by: INTERNAL MEDICINE

## 2024-03-05 PROCEDURE — 71045 X-RAY EXAM CHEST 1 VIEW: CPT

## 2024-03-05 PROCEDURE — 92610 EVALUATE SWALLOWING FUNCTION: CPT

## 2024-03-05 PROCEDURE — 86850 RBC ANTIBODY SCREEN: CPT

## 2024-03-05 PROCEDURE — 36415 COLL VENOUS BLD VENIPUNCTURE: CPT

## 2024-03-05 PROCEDURE — A4216 STERILE WATER/SALINE, 10 ML: HCPCS | Performed by: INTERNAL MEDICINE

## 2024-03-05 PROCEDURE — 83735 ASSAY OF MAGNESIUM: CPT

## 2024-03-05 RX ORDER — MORPHINE SULFATE 2 MG/ML
2 INJECTION, SOLUTION INTRAMUSCULAR; INTRAVENOUS EVERY 4 HOURS PRN
Status: DISCONTINUED | OUTPATIENT
Start: 2024-03-05 | End: 2024-03-05

## 2024-03-05 RX ORDER — SODIUM CHLORIDE 9 MG/ML
INJECTION, SOLUTION INTRAVENOUS CONTINUOUS
Status: DISCONTINUED | OUTPATIENT
Start: 2024-03-05 | End: 2024-03-06 | Stop reason: HOSPADM

## 2024-03-05 RX ORDER — LORAZEPAM 2 MG/ML
0.5 INJECTION INTRAMUSCULAR
Status: DISCONTINUED | OUTPATIENT
Start: 2024-03-05 | End: 2024-03-06 | Stop reason: HOSPADM

## 2024-03-05 RX ORDER — LORAZEPAM 2 MG/ML
0.5 INJECTION INTRAMUSCULAR EVERY 4 HOURS PRN
Status: DISCONTINUED | OUTPATIENT
Start: 2024-03-05 | End: 2024-03-05

## 2024-03-05 RX ORDER — PANTOPRAZOLE SODIUM 40 MG/10ML
40 INJECTION, POWDER, LYOPHILIZED, FOR SOLUTION INTRAVENOUS DAILY
Status: DISCONTINUED | OUTPATIENT
Start: 2024-03-05 | End: 2024-03-05

## 2024-03-05 RX ORDER — MORPHINE SULFATE 2 MG/ML
2 INJECTION, SOLUTION INTRAMUSCULAR; INTRAVENOUS
Status: DISCONTINUED | OUTPATIENT
Start: 2024-03-05 | End: 2024-03-06 | Stop reason: HOSPADM

## 2024-03-05 RX ADMIN — ENOXAPARIN SODIUM 40 MG: 100 INJECTION SUBCUTANEOUS at 09:03

## 2024-03-05 RX ADMIN — PANTOPRAZOLE SODIUM 80 MG: 40 INJECTION, POWDER, FOR SOLUTION INTRAVENOUS at 15:55

## 2024-03-05 RX ADMIN — ATORVASTATIN CALCIUM 20 MG: 10 TABLET, FILM COATED ORAL at 09:03

## 2024-03-05 RX ADMIN — ASPIRIN 81 MG 81 MG: 81 TABLET ORAL at 09:03

## 2024-03-05 RX ADMIN — CLOPIDOGREL BISULFATE 75 MG: 75 TABLET ORAL at 09:02

## 2024-03-05 RX ADMIN — LORAZEPAM 0.5 MG: 2 INJECTION INTRAMUSCULAR; INTRAVENOUS at 17:07

## 2024-03-05 RX ADMIN — Medication 1 CAPSULE: at 09:03

## 2024-03-05 RX ADMIN — PANTOPRAZOLE SODIUM 8 MG/HR: 40 INJECTION, POWDER, FOR SOLUTION INTRAVENOUS at 15:52

## 2024-03-05 RX ADMIN — SODIUM CHLORIDE, PRESERVATIVE FREE 10 ML: 5 INJECTION INTRAVENOUS at 20:16

## 2024-03-05 RX ADMIN — QUETIAPINE FUMARATE 25 MG: 25 TABLET ORAL at 09:03

## 2024-03-05 NOTE — PROGRESS NOTES
Speech Language Pathology  Clinical Bedside Swallow Assessment  Facility/Department: [facility] - MED SURG      Recommendations:  Diet recommendation: IDDSI 6 Soft and Bite Sized Solids; IDDSI 0 Thin Liquids; Meds crushed in puree as able  Instrumentation: pt may benefit from completion of MBSS to correlate clinical findings, however pt does not appear appropriate for completion of study at this time (given cognitive deficit with consistent max cues required for basic command following and reduced JEREMY).  Will continue to monitor.  Risk management: upright for all intake, stay upright for at least 30 mins after intake, small bites/sips, total feed, only feed when most alert, hand-over-hand administration to enhance sensory response, 1:1 supervision with intake, oral care q4 hrs to reduce adverse affects in the event of aspiration, increase physical mobility as able, alternate bites/sips, slow rate of intake, general GERD precautions, general aspiration precautions, and hold PO and contact SLP if s/s of aspiration or worsening respiratory status develop.    Pt is considered at an increased risk of aspiration with PO intake given total feed assistance required, cognitive deficit, dependence for oral care, etc.      NAME:Hans Corbin  : 1929 (89 y.o.)   MRN: [id number]  ROOM: [facility]0364-  ADMISSION DATE: 2024  PATIENT DIAGNOSIS(ES): Syncope and collapse [R55]  Loss of consciousness (HCC) [R40.20]  Elevated troponin [R79.89]  Bilateral pleural effusion [J90]  Elevated brain natriuretic peptide (BNP) level [R79.89]  Multiple falls [R29.6]  Acute midline low back pain without sciatica [M54.50]  Chief Complaint   Patient presents with    Altered Mental Status     altered mental status, patient brought from home per wife report patient LOC while using restroom. Patient alert and following commands, semi lethargic when EMS arrived       Patient Active Problem List    Diagnosis Date Noted    Postoperative pain      Non-recurrent unilateral inguinal hernia without obstruction or gangrene 03/22/2022    Orthostatic hypotension     Coronary artery disease involving native coronary artery of native heart without angina pectoris     Closed right hip fracture ([facility]) 11/29/2020    JENISE (acute kidney injury) ([facility]) 11/29/2020    Closed right hip fracture, initial encounter ([facility]) 11/29/2020    HTN (hypertension), benign 03/29/2018    Hyperlipidemia 03/29/2018    Syncope and collapse 03/29/2018     Past Medical History:   Diagnosis Date    Cancer (HCC)     Bladder CA status post chemo/radiation 2018    Coronary artery disease     PCI 2007    CVA (cerebral vascular accident) ([facility]) 2002    Hyperlipidemia     Hypertension     Prostate cancer (HCC)     Tx seeds     Past Surgical History:   Procedure Laterality Date    CYSTOSCOPY      few cysto    FEMUR FRACTURE SURGERY Right 11/30/2020    FEMUR INTRAMEDULLARY NAIL BRITTNEY INSERTION RIGHT performed by Emy Renae DO at [facility] OR    FRACTURE SURGERY      HERNIA REPAIR N/A 4/20/2022    ROBOTIC RIGHT INGUINAL HERNIA REPAIR WITH MESH performed by Jone Sánchez MD at [facility] OR     No Known Allergies    DATE ONSET: 2/25/2024    Date of Evaluation: 3/5/2024   Evaluating Therapist: JOVANY Mccormick    Chart Reviewed: : [x] Yes [] No     Pain: The patient does not complain of pain     Current Diet: Diet NPO      Most Recent Imaging    XR CHEST PORTABLE   Final Result   No acute finding or significant change.         XR CHEST PORTABLE   Final Result   Stable chest.  Mild dependent left basilar opacification with small effusion.   No superimposed acute infiltrate.         CT LUMBAR SPINE WO CONTRAST   Final Result   1. Age-indeterminate wedge compression deformities of the superior endplates   of L4 and L5, most likely chronic.  However, consider a follow-up lumbar MRI   to further evaluate the acuity of these abnormalities.   2. 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 CCC-SLP  Speech-language pathologist  [id number]

## 2024-03-05 NOTE — CARE COORDINATION
Chart reviewed day 9. Care provided by podiatry and IM. Pt is from home with spouse. Pt has been accepted at [facility] for SNF. Family spoke with palliative care and hospice is being consulted. BUN 70, WBC 17.8, H&H down to 8.4 and 25.7. Will continue to follow course for needs. Jyothi Granados RN

## 2024-03-05 NOTE — PROGRESS NOTES
[facility]    Call to spouse Kimmie, attempt to discuss HOC philosophy, levels of care and services. Spouse extremely [place], and seems to be unable to understand what is being said. She requests call be placed to her son Hans Dixon [phone].  Called placed, left VM to return call. JESS Pimentel and Rosa Stone CNP updated. Thank you for the opportunity to serve this patient. HOC RN to F/U daily and as requested. Any needs or questions please call [phone].    Nataliia Mark BSN, RN

## 2024-03-05 NOTE — PROGRESS NOTES
03/05/24 1654   Encounter Summary   Encounter Overview/Reason  Rituals, Rites and Sacraments   Last Encounter    (3/5: pt anointed as requested)   Begin Time 1640   End Time  1655   Total Time Calculated 15 min   Rituals, Rites and Sacraments   Type Anointing     Thank you for consulting Spiritual Health    If you would like a 's presence for emotional, spiritual, grief or comfort care,   please dial \"[phone]\" and ask for the  on-call to be paged.    For help with Advanced Care Planning, Power of  for Healthcare or Living Will forms, you may also call us directly:    [phone]) Tayo  [phone]) Yazmin  [phone]) Outpatient    - Jone Alejandre    [facility]

## 2024-03-05 NOTE — PLAN OF CARE
Problem: Skin/Tissue Integrity  Goal: Absence of new skin breakdown  Description: 1.  Monitor for areas of redness and/or skin breakdown  2.  Assess vascular access sites hourly  3.  Every 4-6 hours minimum:  Change oxygen saturation probe site  4.  Every 4-6 hours:  If on nasal continuous positive airway pressure, respiratory therapy assess nares and determine need for appliance change or resting period.  Outcome: Progressing     Problem: Safety - Adult  Goal: Free from fall injury  Outcome: Progressing     Problem: Chronic Conditions and Co-morbidities  Goal: Patient's chronic conditions and co-morbidity symptoms are monitored and maintained or improved  Outcome: Progressing     Problem: Skin/Tissue Integrity - Adult  Goal: Skin integrity remains intact  Outcome: Progressing

## 2024-03-05 NOTE — PLAN OF CARE
Bedside swallow evaluation completed this date.    Daniela Martinez M.S. CCC-SLP  Speech-language pathologist  [id number]

## 2024-03-05 NOTE — SIGNIFICANT EVENT
Rapid response called for pt with very dark emesis and hypotension down to the 50s. Given ivf bolus(with some improvement in BP), and iv ppi ggt started. Discussed with palliative care (who spoke to wife) and grandson Robetr. Decision made to pursue comfort measures. Code status adjusted to DNRcc.  Comfort meds added.  Will continue to monitor. Family requested  services.

## 2024-03-05 NOTE — PROGRESS NOTES
Hospital Medicine Progress Note      Date of Admission: 2/25/2024  Hospital Day: 10      Chief Admission Complaint:  Weakness, increased confusion, fall possible syncope        Subjective:  no complaints, resting in bed, awake and alert and confused, wbc trending down, concern for aspiration this morning(SLP eval ordered)     Presenting Admission History:          89 y.o. male  with PMH significant for HTN, Hyperlipidemia, CAD, dementia.  History of present illness is limited as patient is a very poor historian and his wife who is present is also a very poor historian     He was brought to the ED by EMS after his wife called them.    Per report he has been having increased weakness, increased confusion and multiple falls at home.  There was what was described a syncopal episode while he was in the toilet and trying to get up off the toilet.  He does have complaint of lower back pain.  Wife describes he is getting increased confused, increased weakness and having multiple falls at home.  She is having difficult time taking care of him and brought him to the ED for further evaluation.  Workup in the ED did include CT lumbar spine which did reveal compression fractures likely chronic.  CT scan of the head revealed no acute intracranial abnormality.  Chest x-ray also done.  There is no report of chest pain or sob.      He not safe to be discharged home.  Wife states she is not able to care for him and she has concern that  he would  fall and sustain injuries.  He is now admitted for further evaluation management        Assessment/Plan:       Current Principal Problem:  Syncope and collapse       Syncope : Uncertain if there was true syncope or he is very weak and falling.  Per wife there is more falling and weakness.   He was admitted and followed on telemetry to exclude any malignant arrhythmia.     Weakness and deconditioning: He does appear very weak and frail.  Per wife he is having increased walking at home and  or rhonchi noted.  Normal respiratory effort.   Cardiovascular:  Regular rate and rhythm with normal S1/S2   Abdomen:  Soft, non-tender, non-distended with normal bowel sounds.  Musculoskeletal:  No clubbing, cyanosis, trace lower extremity edema bilaterally.    Neurologic:  Neurovascularly intact without any focal sensory/motor deficits. Cranial nerves: II-XII intact, grossly non-focal.  Psychiatric: He is awake, he is confused.  He is unable to tell me the name of this hospital, the month or the year.  Short-term recall is 0/3 objects at 1 minute,  poor insight  He is not able to tell me why he is in the hospital.  Peripheral Pulses:  +2 palpable, equal bilaterally     BP (!) 113/44   Pulse 67   Temp 99.5 °F (37.5 °C) (Axillary)   Resp 20   Ht 1.829 m (6')   Wt 78.1 kg (172 lb 2.9 oz)   SpO2 98%   BMI 23.35 kg/m²     Diet: ADULT DIET; Dysphagia - Soft and Bite Sized  ADULT ORAL NUTRITION SUPPLEMENT; Breakfast, Dinner; Standard High Calorie/High Protein Oral Supplement  DVT Prophylaxis: [x]PPx LMWH  []SQ Heparin  []IPC/SCDs  []Eliquis  []Xarelto  []Coumadin  []Other -      Code status: Limited  PT/OT Eval Status:   []NOT yet ordered  []Ordered and Pending   [x]Seen with Recommendations for:  []Home independently  []Home w/ assist  []HHC  [x]SNF  []Acute Rehab    Anticipated Discharge Day/Date:  Pending palliative care recs, repeat labs    Anticipated Discharge Location: []Home  []HHC  []SNF  []Acute Rehab  []ECF  []LTAC  [x]Hospice  []Other -      Consults:      IP CONSULT TO PALLIATIVE CARE  IP CONSULT TO CARDIOLOGY  IP CONSULT TO PODIATRY  IP CONSULT TO HOSPICE      This patient has a high likelihood of being discharged tomorrow and is appropriate for A1 Discharge Unit in AM pending clinical course overnight: []Yes  [x]No    ------------------------------------------------------------------------------------------------------------------------------------------------------------------------    MDM      [x]

## 2024-03-06 NOTE — PROGRESS NOTES
24   Encounter Summary   Encounter Overview/Reason  Follow-up   Service Provided For: Family   Last Encounter    (3/5: attempt to check in with family, no one present at time of visit, called son Hans and spoke with him.  TP Nomi is desired  home.  Informed medical staff)   Begin Time    End Time     Total Time Calculated 23 min   Spiritual/Emotional needs   Type Spiritual Support       [facility] ([facility]) was identified as the family's choice for  home.    Thank you for consulting Spiritual Health    If you would like a 's presence for emotional, spiritual, grief or comfort care,   please dial \"[phone]\" and ask for the  on-call to be paged.    For help with Advanced Care Planning, Power of  for Healthcare or Living Will forms, you may also call us directly:    [phone]) Tayo  [phone]) Yazmin  [phone]) Outpatient    - Jone Alejandre    [facility]

## 2024-03-06 NOTE — FLOWSHEET NOTE
24   Attending Physican Contact   Attending Physician Notified Y   Attending Physician Name Eric Sheppard APRJEREMY   Post Mortem Checklist   Date of Death 24   Time of Death    Absence of Vital Signs Absence of pulse (apical and radial);Absence of blood pressure;Absence of respiratory effort   Pronounced By Bird Womack RN   RN Witness to Absence of Vital Signs Tracy Barrett RN   RN Witness to Absence of Vital Signs 2 Bird Springer RN   Who Will Sign Death Certificate PCP   Name of Family Notified of Death Hans Corbin    Relationship to Patient Son    Phone Number [phone]   Is This a  Case No   OPO Notified Yes   Date OPO Notified 24   Time OPO Notified    OPO Referral Number [id number]   OPO Accepted No   Body Preparation Complete Yes   Patient Identification Yes   Morgue Tags on Body Yes   Family Request Autopsy No   Physician Request Autopsy No   Autopsy Authorization Completed Not applicable   Autopsy Performed at This Hospital Not applicable   Was the Patient in Restraints at Death No   Was the Patient in Restraints in the Last 24 hours No   Was the Patient in Restraints in the Last 7 Days Prior to Time of Death Yes   Has the Clinical Supervisor and/or Quality Management Been Notified Yes    Home Notified Yes  (by security)   Name of  Home [employer]    Home Phone Number [phone]   Body Released Yes   Body Picked Up Yes   Body Enroute Morgue   Belongings Sent To Morgue   Belongings Sent  Other (Comment)  (prayer blanket)   Dental Appliances None   Vision - Corrective Lenses None   Hearing Aid None   Jewelry At home   Clothing At home   Electronic Devices At home   Notifications   Security/Protective Services Notified Yes   Clinical Supervisor/Bed Board Notified Yes   Switchboard/EBX Notified Yes   Nursing Home Notified Not applicable   Hospice Notified Not applicable   Spiritual Services  Notified Yes   Primary Care Physician Notified Yes   Other Notifications (Specify) Night Shift NP and Family

## 2024-03-06 NOTE — PROGRESS NOTES
Required reporting for death within 7days of removal of 2 point soft wrist restraints completed- added to facility database.

## 2024-03-06 NOTE — PROGRESS NOTES
Patient time of death 2118 on 3/5/24. Dual nurse verification via Bird Springer RN and Tracy Barrett RN.

## 2024-03-06 NOTE — DISCHARGE SUMMARY
Hospital Medicine Discharge Summary    Patient: Hans Corbin   : 1929     Primary Care Provider: Reinier Gutierrez MD  Admitting Provider: Georgie White MD  Discharge Provider: Andrea Kent MD     Admit Date: 2024   Disposition:      Date of Death: 3/5/24  Time of Death:  pm    Immediate Cause of Death: Respiratory failure  Underlying Conditions: GIB  Other Contributing Conditions: lactic acidosis, syncope, htn, cardiomyopathy, cad, dementia,     Discharge Diagnoses:    Active Hospital Problems    Diagnosis     Syncope and collapse [R55]        Presenting Admission History:     89 y.o. male  with PMH significant for HTN, Hyperlipidemia, CAD, dementia.  History of present illness is limited as patient is a very poor historian and his wife who is present is also a very poor historian     He was brought to the ED by EMS after his wife called them.    Per report he has been having increased weakness, increased confusion and multiple falls at home.  There was what was described a syncopal episode while he was in the toilet and trying to get up off the toilet.  He does have complaint of lower back pain.  Wife describes he is getting increased confused, increased weakness and having multiple falls at home.  She is having difficult time taking care of him and brought him to the ED for further evaluation.  Workup in the ED did include CT lumbar spine which did reveal compression fractures likely chronic.  CT scan of the head revealed no acute intracranial abnormality.  Chest x-ray also done.  There is no report of chest pain or sob.      He not safe to be discharged home.  Wife states she is not able to care for him and she has concern that  he would  fall and sustain injuries.  He is now admitted for further evaluation management     Assessment/Plan:    Syncope : Uncertain if there was true syncope or he is very weak and falling.  Per wife there is more falling and weakness.   He was  for him at home.  Will continue supportive care and follow  -palliative care consulted 2/26, apprec recs and mgmt   -SLP ordered 3/5 given concerns for aspiration       Consults:     IP CONSULT TO PALLIATIVE CARE  IP CONSULT TO CARDIOLOGY  IP CONSULT TO PODIATRY  IP CONSULT TO HOSPICE    Signed:    Andrea Kent MD
